# Patient Record
Sex: FEMALE | Race: WHITE | Employment: FULL TIME | URBAN - METROPOLITAN AREA
[De-identification: names, ages, dates, MRNs, and addresses within clinical notes are randomized per-mention and may not be internally consistent; named-entity substitution may affect disease eponyms.]

---

## 2017-10-31 ENCOUNTER — TELEPHONE (OUTPATIENT)
Dept: PRIMARY CARE CLINIC | Age: 57
End: 2017-10-31

## 2017-10-31 ENCOUNTER — HOSPITAL ENCOUNTER (OUTPATIENT)
Dept: GENERAL RADIOLOGY | Age: 57
Discharge: HOME | End: 2017-10-31
Payer: COMMERCIAL

## 2017-10-31 ENCOUNTER — HOSPITAL ENCOUNTER (OUTPATIENT)
Age: 57
Discharge: HOME | End: 2017-10-31
Payer: COMMERCIAL

## 2017-10-31 ENCOUNTER — OFFICE VISIT (OUTPATIENT)
Dept: PRIMARY CARE CLINIC | Age: 57
End: 2017-10-31
Payer: COMMERCIAL

## 2017-10-31 VITALS
HEART RATE: 77 BPM | WEIGHT: 226.3 LBS | BODY MASS INDEX: 40.09 KG/M2 | SYSTOLIC BLOOD PRESSURE: 156 MMHG | DIASTOLIC BLOOD PRESSURE: 74 MMHG | RESPIRATION RATE: 16 BRPM

## 2017-10-31 DIAGNOSIS — M79.672 LEFT FOOT PAIN: Primary | ICD-10-CM

## 2017-10-31 DIAGNOSIS — M79.672 LEFT FOOT PAIN: ICD-10-CM

## 2017-10-31 PROCEDURE — 99213 OFFICE O/P EST LOW 20 MIN: CPT | Performed by: NURSE PRACTITIONER

## 2017-10-31 PROCEDURE — 73630 X-RAY EXAM OF FOOT: CPT

## 2017-10-31 ASSESSMENT — ENCOUNTER SYMPTOMS
COLOR CHANGE: 0
GASTROINTESTINAL NEGATIVE: 1
RESPIRATORY NEGATIVE: 1

## 2017-10-31 NOTE — PROGRESS NOTES
[facility] [address]  Dept: [phone]  Dept Fax: [phone]    Angel Romero is a 61 y.o. female who presents to the [facility] today for her medical conditions/complaints as noted below. Angel Romero is c/o of Foot Pain (left foot. no injury that pt is aware of.  started wednesday.  )      HPI:     Foot Pain   This is a new problem. The current episode started in the past 7 days. The problem has been unchanged. The symptoms are aggravated by standing and walking. She has tried nothing for the symptoms. Past Medical History:   Diagnosis Date    Diabetes mellitus ([facility] [address]     Hepatitis A     Hyperlipidemia     Hypertension         Current Outpatient Prescriptions   Medication Sig Dispense Refill    lisinopril-hydrochlorothiazide (PRINZIDE;ZESTORETIC) 20-12.5 MG per tablet TAKE ONE TABLET BY MOUTH ONCE DAILY 90 tablet 1    SitaGLIPtin-MetFORMIN HCl -1000 MG TB24 Take 1 tablet by mouth daily PATIENT ASSISTANCE 90 tablet 3    ezetimibe (ZETIA) 10 MG tablet Take 1 tablet by mouth daily 90 tablet 3    SitaGLIPtin-MetFORMIN HCl ER (JANUMET XR) 100-1000 MG TB24 Take 1 tablet by mouth daily for 14 days SAMPLE 14 tablet 0    gabapentin (NEURONTIN) 100 MG capsule Take 1 capsule by mouth daily 90 capsule 3     No current facility-administered medications for this visit. Allergies   Allergen Reactions    Statins Swelling       Subjective:      Review of Systems    Objective:     Physical Exam  There were no vitals taken for this visit. Assessment:     No diagnosis found. Plan:       See above note     Discussed exam, POCT findings, plan of care (including prescriptive and supportive as listed below) and follow-up at length with patient and or Patient. Reviewed all prescribed and recommended medications, administration and side effects.   Specifically: acetaminophen, ibuprofen,    Encouraged to return to

## 2017-10-31 NOTE — PROGRESS NOTES
Subjective:      Patient ID: Elva Kennedy is a 61 y.o. female. \"I wore a new pair of shoes last week and that when my left foot started hurting\"      Foot Pain   This is a new problem. The current episode started in the past 7 days. The problem occurs constantly. The problem has been rapidly worsening. Pertinent negatives include no fever, joint swelling or rash. The symptoms are aggravated by walking. She has tried nothing for the symptoms. Review of Systems   Constitutional: Negative. Negative for activity change, appetite change and fever. HENT: Negative. Respiratory: Negative. Cardiovascular: Negative. Gastrointestinal: Negative. Genitourinary: Negative. Musculoskeletal: Negative for joint swelling. Left foot pain    Skin: Negative. Negative for color change, pallor, rash and wound. Objective:   Physical Exam   Constitutional: She is oriented to person, place, and time. Vital signs are normal. She appears well-developed and well-nourished. She is cooperative. Non-toxic appearance. She does not appear ill. No distress. Appears well hydrated and non toxic. Sitting upright in chair without distress. Respirations are regular, non labored and quiet. HENT:   Head: Normocephalic and atraumatic. Nose: Nose normal.   Mouth/Throat: Uvula is midline, oropharynx is clear and moist and mucous membranes are normal.   Eyes: Conjunctivae and EOM are normal. Pupils are equal, round, and reactive to light. Neck: Normal range of motion. Neck supple. No tracheal deviation present. Cardiovascular: Normal rate, regular rhythm, normal heart sounds and intact distal pulses. Exam reveals no gallop and no friction rub. No murmur heard. Pulses:       Radial pulses are 2+ on the left side. Dorsalis pedis pulses are 2+ on the right side, and 2+ on the left side. Posterior tibial pulses are 2+ on the right side, and 2+ on the left side.    Pulmonary/Chest: Effort normal. No accessory muscle usage. No tachypnea. No respiratory distress. She has no decreased breath sounds. She has no wheezes. She has no rhonchi. She has no rales. Breath sounds are clear to auscultation over posterior bilateral fields. Chest expansion is symmetrical with non-restricted air movement. Musculoskeletal: Normal range of motion. She exhibits tenderness. She exhibits no edema or deformity. Left ankle: Normal.        Feet:    Neurological: She is alert and oriented to person, place, and time. She has normal strength. No cranial nerve deficit. She exhibits normal muscle tone. Coordination and gait normal.   Skin: Skin is warm and dry. No abrasion, no bruising, no ecchymosis and no rash noted. No erythema. Psychiatric: She has a normal mood and affect. Her speech is normal and behavior is normal. Judgment and thought content normal.   Nursing note and vitals reviewed. Assessment:       1. Left foot pain  XR FOOT LEFT (MIN 3 VIEWS)           Plan:       Caregiver/patient informed today if any severe or worsening of symptoms, spikes in fever, difficulty swallowing, respiratory distress, calf pain or tendeness to go to ED. Caregiver/patient verbalizes understanding. Orders Placed This Encounter   Procedures    XR FOOT LEFT (MIN 3 VIEWS)     Standing Status:   Future     Standing Expiration Date:   10/31/2018     Order Specific Question:   Reason for exam:     Answer:   left foot pain     No orders of the defined types were placed in this encounter. She is asked to follow-up if symptoms persist or worsen.

## 2017-11-06 ENCOUNTER — OFFICE VISIT (OUTPATIENT)
Dept: PRIMARY CARE CLINIC | Age: 57
End: 2017-11-06
Payer: COMMERCIAL

## 2017-11-06 ENCOUNTER — TELEPHONE (OUTPATIENT)
Dept: PRIMARY CARE CLINIC | Age: 57
End: 2017-11-06

## 2017-11-06 VITALS
TEMPERATURE: 97.8 F | HEIGHT: 62 IN | SYSTOLIC BLOOD PRESSURE: 172 MMHG | HEART RATE: 77 BPM | RESPIRATION RATE: 18 BRPM | DIASTOLIC BLOOD PRESSURE: 75 MMHG | WEIGHT: 229.4 LBS | BODY MASS INDEX: 42.21 KG/M2

## 2017-11-06 DIAGNOSIS — Z12.12 SCREENING FOR COLORECTAL CANCER: Primary | ICD-10-CM

## 2017-11-06 DIAGNOSIS — I10 ESSENTIAL HYPERTENSION: ICD-10-CM

## 2017-11-06 DIAGNOSIS — Z12.11 SCREENING FOR COLORECTAL CANCER: Primary | ICD-10-CM

## 2017-11-06 DIAGNOSIS — Z23 NEED FOR VACCINATION AGAINST STREPTOCOCCUS PNEUMONIAE: ICD-10-CM

## 2017-11-06 DIAGNOSIS — E78.5 DYSLIPIDEMIA: ICD-10-CM

## 2017-11-06 LAB — HBA1C MFR BLD: 12.6 %

## 2017-11-06 PROCEDURE — 90471 IMMUNIZATION ADMIN: CPT | Performed by: NURSE PRACTITIONER

## 2017-11-06 PROCEDURE — 99214 OFFICE O/P EST MOD 30 MIN: CPT | Performed by: NURSE PRACTITIONER

## 2017-11-06 PROCEDURE — 83036 HEMOGLOBIN GLYCOSYLATED A1C: CPT | Performed by: NURSE PRACTITIONER

## 2017-11-06 PROCEDURE — 90732 PPSV23 VACC 2 YRS+ SUBQ/IM: CPT | Performed by: NURSE PRACTITIONER

## 2017-11-06 RX ORDER — LISINOPRIL AND HYDROCHLOROTHIAZIDE 20; 12.5 MG/1; MG/1
TABLET ORAL
Qty: 90 TABLET | Refills: 1 | Status: SHIPPED | OUTPATIENT
Start: 2017-11-06 | End: 2020-07-22 | Stop reason: SDDI

## 2017-11-06 ASSESSMENT — ENCOUNTER SYMPTOMS
SHORTNESS OF BREATH: 0
CONSTIPATION: 0
VOMITING: 0
DIARRHEA: 0
RHINORRHEA: 0
WHEEZING: 0
ABDOMINAL PAIN: 0
SORE THROAT: 0
COUGH: 0
NAUSEA: 0

## 2017-11-06 ASSESSMENT — PATIENT HEALTH QUESTIONNAIRE - PHQ9
2. FEELING DOWN, DEPRESSED OR HOPELESS: 1
SUM OF ALL RESPONSES TO PHQ QUESTIONS 1-9: 2
1. LITTLE INTEREST OR PLEASURE IN DOING THINGS: 1
SUM OF ALL RESPONSES TO PHQ9 QUESTIONS 1 & 2: 2

## 2017-11-06 NOTE — PROGRESS NOTES
172/75 (Site: Right Arm, Position: Sitting, Cuff Size: Large Adult)   Pulse 77   Temp 97.8 °F (36.6 °C) (Temporal)   Resp 18   Ht 5' 2\" (1.575 m)   Wt 229 lb 6.4 oz (104.1 kg)   BMI 41.96 kg/m²     Physical Exam   Constitutional: She is oriented to person, place, and time. She appears well-developed and well-nourished. No distress. Over nourished   HENT:   Mouth/Throat: Oropharynx is clear and moist.   Eyes: Conjunctivae are normal.   Neck: Normal range of motion. Neck supple. Cardiovascular: Normal rate and regular rhythm. No murmur heard. Pulmonary/Chest: Effort normal and breath sounds normal. She has no wheezes. Abdominal: Soft. Bowel sounds are normal. She exhibits no distension. There is no tenderness. Obese abdomen   Musculoskeletal: She exhibits no edema. Lymphadenopathy:     She has no cervical adenopathy. Neurological: She is alert and oriented to person, place, and time. Skin: Skin is warm and dry. No rash noted. Psychiatric: She has a normal mood and affect. Her behavior is normal.   Nursing note and vitals reviewed. Patient is here for diabetic followup. A complete foot exam was done as follows:  Skin: Skin color, texture, turgor normal. No rashes or lesions.   Pulses:  present 2+  Nails:  normal appearing nails bilaterally  Monofilament testing:  present  Hair:  partially present    Data:     Lab Results   Component Value Date     07/18/2016    K 4.0 07/18/2016     07/18/2016    CO2 26 07/18/2016    BUN 23 07/18/2016    CREATININE 0.79 07/18/2016    GLUCOSE 141 07/18/2016    PROT 5.8 01/09/2015    LABALBU 4.3 01/27/2015    BILITOT 0.40 01/09/2015    ALKPHOS 57 01/09/2015    AST 19 07/18/2016    ALT 24 07/18/2016     Lab Results   Component Value Date    WBC 8.8 07/18/2016    RBC 4.80 07/18/2016    HGB 13.2 07/18/2016    HCT 39.9 07/18/2016    MCV 83.7 01/09/2015    MCH 27.6 01/09/2015    MCHC 33.0 01/09/2015    RDW 14.5 01/09/2015     07/18/2016    MPV NOT REPORTED 01/09/2015     Lab Results   Component Value Date    TSH 1.78 07/18/2016     Lab Results   Component Value Date    CHOL 217 07/18/2016    HDL 46 07/18/2016    LABA1C 12.6 11/06/2017       Assessment/Plan:     1. Uncontrolled type 2 diabetes mellitus with complication, without long-term current use of insulin (HCC)   DIABETES FOOT EXAM    POCT glycosylated hemoglobin (Hb A1C)   2. Essential hypertension  lisinopril-hydrochlorothiazide (PRINZIDE;ZESTORETIC) 20-12.5 MG per tablet   3. Dyslipidemia     4. Need for vaccination against Streptococcus pneumoniae  Pneumococcal polysaccharide vaccine 23-valent greater than or equal to 3yo subcutaneous/IM       1.  Wendy received counseling on the following healthy behaviors: nutrition, exercise and medication adherence  2. Patient given educational materials - see patient instructions  3. Was a self-tracking handout given in paper form or via [employer]? No  If yes, see orders or list here. 4.  Discussed use, benefit, and side effects of prescribed medications. Barriers to medication compliance addressed. All patient questions answered. Pt voiced understanding. 5.  Reviewed prior labs and health maintenance  6. Continue current medications, diet and exercise. Completed Refills   Requested Prescriptions     Signed Prescriptions Disp Refills    lisinopril-hydrochlorothiazide (PRINZIDE;ZESTORETIC) 20-12.5 MG per tablet 90 tablet 1     Sig: TAKE ONE TABLET BY MOUTH ONCE DAILY       LABS AT [facility] IN 4 DAYS   BRING RESULTS TO OFFICE  RESTART ANTIHYPERTENSIVE  BP CHECK IN 2 WEEKS    Return in about 6 months (around 5/6/2018) for check up.

## 2017-11-06 NOTE — PATIENT INSTRUCTIONS
infection. ¨ Keep your skin soft. Use moisturizing skin cream to keep the skin on your feet soft and prevent calluses and cracks. But do not put the cream between your toes, and stop using any cream that causes a rash. ¨ Clean underneath your toenails carefully. Do not use a sharp object to clean underneath your toenails. Use the blunt end of a nail file or other rounded tool. ¨ Trim and file your toenails straight across to prevent ingrown toenails. Use a nail clipper, not scissors. Use an emery board to smooth the edges. · Change socks daily. Socks without seams are best, because seams often rub the feet. You can find socks for people with diabetes from specialty catalogs. · Look inside your shoes every day for things like gravel or torn linings, which could cause blisters or sores. · Buy shoes that fit well:  ¨ Look for shoes that have plenty of space around the toes. This helps prevent bunions and blisters. ¨ Try on shoes while wearing the kind of socks you will usually wear with the shoes. ¨ Avoid plastic shoes. They may rub your feet and cause blisters. Good shoes should be made of materials that are flexible and breathable, such as leather or cloth. ¨ Break in new shoes slowly by wearing them for no more than an hour a day for several days. Take extra time to check your feet for red areas, blisters, or other problems after you wear new shoes. · Do not go barefoot. Do not wear sandals, and do not wear shoes with very thin soles. Thin soles are easy to puncture. They also do not protect your feet from hot pavement or cold weather. · Have your doctor check your feet during each visit. If you have a foot problem, see your doctor. Do not try to treat an early foot problem at home. Home remedies or treatments that you can buy without a prescription (such as corn removers) can be harmful. · Always get early treatment for foot problems.  A minor irritation can lead to a major problem if not properly cared for early. When should you call for help? Call your doctor now or seek immediate medical care if:  · You have a foot sore, an ulcer or break in the skin that is not healing after 4 days, bleeding corns or calluses, or an ingrown toenail. · You have blue or black areas, which can mean bruising or blood flow problems. · You have peeling skin or tiny blisters between your toes or cracking or oozing of the skin. · You have a fever for more than 24 hours and a foot sore. · You have new numbness or tingling in your feet that does not go away after you move your feet or change positions. · You have unexplained or unusual swelling of the foot or ankle. Watch closely for changes in your health, and be sure to contact your doctor if:  · You cannot do proper foot care. Where can you learn more? Go to [email or web address] org and sign in to your [employer] account. Enter A739 in the [employer] box to learn more about \"Diabetes Foot Health: Care Instructions. \"     If you do not have an account, please click on the \"Sign Up Now\" link. Current as of: March 13, 2017  Content Version: 11.3  © 4735-6901 [employer]. Care instructions adapted under license by [facility] ([facility]). If you have questions about a medical condition or this instruction, always ask your healthcare professional. Mary Ann [employer] any warranty or liability for your use of this information.

## 2017-11-10 LAB
ALT SERPL-CCNC: 27 U/L
AST SERPL-CCNC: 22 U/L
BASOPHILS ABSOLUTE: NORMAL /ΜL
BASOPHILS RELATIVE PERCENT: NORMAL %
BUN BLDV-MCNC: 24 MG/DL
CALCIUM SERPL-MCNC: 10.7 MG/DL
CHLORIDE BLD-SCNC: 96 MMOL/L
CHOLESTEROL, TOTAL: 271 MG/DL
CHOLESTEROL/HDL RATIO: 5.4
CO2: 24 MMOL/L
CREAT SERPL-MCNC: 0.66 MG/DL
EOSINOPHILS ABSOLUTE: NORMAL /ΜL
EOSINOPHILS RELATIVE PERCENT: NORMAL %
GFR CALCULATED: >60
GLUCOSE BLD-MCNC: 263 MG/DL
HCT VFR BLD CALC: 43.6 % (ref 36–46)
HDLC SERPL-MCNC: 50 MG/DL (ref 35–70)
HEMOGLOBIN: 14 G/DL (ref 12–16)
LDL CHOLESTEROL CALCULATED: 175 MG/DL (ref 0–160)
LYMPHOCYTES ABSOLUTE: NORMAL /ΜL
LYMPHOCYTES RELATIVE PERCENT: NORMAL %
MCH RBC QN AUTO: NORMAL PG
MCHC RBC AUTO-ENTMCNC: NORMAL G/DL
MCV RBC AUTO: NORMAL FL
MONOCYTES ABSOLUTE: NORMAL /ΜL
MONOCYTES RELATIVE PERCENT: NORMAL %
NEUTROPHILS ABSOLUTE: NORMAL /ΜL
NEUTROPHILS RELATIVE PERCENT: NORMAL %
PLATELET # BLD: 249 K/ΜL
PMV BLD AUTO: 9.2 FL
POTASSIUM SERPL-SCNC: 4.6 MMOL/L
RBC # BLD: 5.24 10^6/ΜL
SODIUM BLD-SCNC: 139 MMOL/L
TRIGL SERPL-MCNC: 232 MG/DL
VLDLC SERPL CALC-MCNC: ABNORMAL MG/DL
WBC # BLD: 7.7 10^3/ML

## 2017-11-20 ENCOUNTER — NURSE ONLY (OUTPATIENT)
Dept: PRIMARY CARE CLINIC | Age: 57
End: 2017-11-20

## 2017-11-20 VITALS
TEMPERATURE: 97.1 F | WEIGHT: 226.6 LBS | DIASTOLIC BLOOD PRESSURE: 60 MMHG | HEART RATE: 78 BPM | SYSTOLIC BLOOD PRESSURE: 130 MMHG | BODY MASS INDEX: 41.45 KG/M2 | RESPIRATION RATE: 18 BRPM

## 2017-11-20 DIAGNOSIS — E78.5 DYSLIPIDEMIA: ICD-10-CM

## 2017-11-20 RX ORDER — EZETIMIBE 10 MG/1
10 TABLET ORAL DAILY
Qty: 90 TABLET | Refills: 3 | Status: SHIPPED | OUTPATIENT
Start: 2017-11-20 | End: 2020-07-22 | Stop reason: SDDI

## 2017-11-21 ENCOUNTER — TELEPHONE (OUTPATIENT)
Dept: PRIMARY CARE CLINIC | Age: 57
End: 2017-11-21

## 2017-12-06 ENCOUNTER — TELEPHONE (OUTPATIENT)
Dept: PRIMARY CARE CLINIC | Age: 57
End: 2017-12-06

## 2017-12-19 ENCOUNTER — TELEPHONE (OUTPATIENT)
Dept: PRIMARY CARE CLINIC | Age: 57
End: 2017-12-19

## 2019-09-26 ENCOUNTER — TELEPHONE (OUTPATIENT)
Dept: PRIMARY CARE CLINIC | Age: 59
End: 2019-09-26

## 2020-01-14 ENCOUNTER — TELEPHONE (OUTPATIENT)
Dept: PRIMARY CARE CLINIC | Age: 60
End: 2020-01-14

## 2020-06-29 ENCOUNTER — OFFICE VISIT (OUTPATIENT)
Dept: OBGYN | Age: 60
End: 2020-06-29
Payer: COMMERCIAL

## 2020-06-29 ENCOUNTER — HOSPITAL ENCOUNTER (OUTPATIENT)
Age: 60
Setting detail: SPECIMEN
Discharge: HOME | End: 2020-06-29
Payer: COMMERCIAL

## 2020-06-29 VITALS
BODY MASS INDEX: 37.36 KG/M2 | HEIGHT: 62 IN | SYSTOLIC BLOOD PRESSURE: 134 MMHG | WEIGHT: 203 LBS | DIASTOLIC BLOOD PRESSURE: 70 MMHG

## 2020-06-29 PROCEDURE — 86403 PARTICLE AGGLUT ANTBDY SCRN: CPT

## 2020-06-29 PROCEDURE — 87070 CULTURE OTHR SPECIMN AEROBIC: CPT

## 2020-06-29 PROCEDURE — 99202 OFFICE O/P NEW SF 15 MIN: CPT | Performed by: OBSTETRICS & GYNECOLOGY

## 2020-06-29 PROCEDURE — 87205 SMEAR GRAM STAIN: CPT

## 2020-06-29 RX ORDER — CIPROFLOXACIN 250 MG/1
250 TABLET, FILM COATED ORAL 2 TIMES DAILY
Qty: 14 TABLET | Refills: 0 | Status: SHIPPED | OUTPATIENT
Start: 2020-06-29 | End: 2020-07-02

## 2020-06-29 RX ORDER — HYDROCODONE BITARTRATE AND ACETAMINOPHEN 5; 325 MG/1; MG/1
1 TABLET ORAL EVERY 4 HOURS PRN
Qty: 18 TABLET | Refills: 0 | Status: SHIPPED | OUTPATIENT
Start: 2020-06-29 | End: 2020-07-02 | Stop reason: SDUPTHER

## 2020-06-29 NOTE — PROGRESS NOTES
PROBLEM VISIT     Date of service: 2020    Kiesha Carlson  Is a 60 y.o.  female    PT's PCP is: No primary care provider on file. : 1960                                             Subjective:       No LMP recorded. Patient has had a hysterectomy. OB History   No obstetric history on file. Social History     Tobacco Use   Smoking Status Never Smoker   Smokeless Tobacco Never Used        Social History     Substance and Sexual Activity   Alcohol Use No       Allergies: Statins      Current Outpatient Medications:     SITagliptin-metFORMIN (JANUMET XR)  MG TB24 per extended release tablet, Take 2 tablets by mouth daily With supper (Patient not taking: Reported on 2020), Disp: 180 tablet, Rfl: 3    ezetimibe (ZETIA) 10 MG tablet, Take 1 tablet by mouth daily (Patient not taking: Reported on 2020), Disp: 90 tablet, Rfl: 3    lisinopril-hydrochlorothiazide (PRINZIDE;ZESTORETIC) 20-12.5 MG per tablet, TAKE ONE TABLET BY MOUTH ONCE DAILY (Patient not taking: Reported on 2020), Disp: 90 tablet, Rfl: 1    Social History     Substance and Sexual Activity   Sexual Activity Not on file       Last Yearly:  unknown    Last pap: unknown    Last HPV: unknown    Chief Complaint   Patient presents with    Perirectal Abscess     Patient presents today with c/o possible Batholin Abscess          NURSE: CARYN    PE:  Vital Signs  Blood pressure 134/70, height 5' 2\" (1.575 m), weight 203 lb (92.1 kg). Labs:    No results found for this visit on 20. HPI: The patient is here as she has developed a painful boil or abscess. This started Thursday and is gotten worse. However it is started to drain today. She has not taken any antibiotics and is significantly uncomfortable from this. No PT denies fever, chills, nausea and vomiting       Objective: The patient has a large abscess in her left upper gluteal to maria esther-vulvar area.   The abscess is draining over the central portion and is open the area of erythema is about 7 8 cm total days of this skin is peeling around the abscess is well there are no obvious tracks from this. Assessment and Plan:    I did instruct the patient to place heat over this whenever possible to take Cipro as directed and use Norco to help with pain she can also take Motrin with this. I instructed her that if this worsens to return to the emergency room and is scheduled to follow-up for later this week to track the progress     Culture was also obtained     Diagnosis Orders   1. Abscess of gluteal region  HYDROcodone-acetaminophen (NORCO) 5-325 MG per tablet    ciprofloxacin (CIPRO) 250 MG tablet             I am having Wendy Garner start on HYDROcodone-acetaminophen and ciprofloxacin. I am also having her maintain her lisinopril-hydroCHLOROthiazide, SITagliptin-metFORMIN, and ezetimibe. No follow-ups on file. There are no Patient Instructions on file for this visit. Over 50% of time spent on counseling and care coordination on: see assessment and plan,  She was also counseled on her preventative health maintenance recommendations and follow-up.         FF time: 15 min      Genette Dandy Hedges,6/29/2020 3:26 PM

## 2020-07-01 LAB
CULTURE: ABNORMAL
DIRECT EXAM: ABNORMAL
DIRECT EXAM: ABNORMAL
Lab: ABNORMAL
SPECIMEN DESCRIPTION: ABNORMAL

## 2020-07-02 ENCOUNTER — OFFICE VISIT (OUTPATIENT)
Dept: OBGYN | Age: 60
End: 2020-07-02
Payer: COMMERCIAL

## 2020-07-02 VITALS
BODY MASS INDEX: 37.73 KG/M2 | DIASTOLIC BLOOD PRESSURE: 74 MMHG | HEIGHT: 62 IN | SYSTOLIC BLOOD PRESSURE: 138 MMHG | WEIGHT: 205 LBS

## 2020-07-02 PROCEDURE — 10060 I&D ABSCESS SIMPLE/SINGLE: CPT | Performed by: OBSTETRICS & GYNECOLOGY

## 2020-07-02 RX ORDER — CIPROFLOXACIN 500 MG/1
500 TABLET, FILM COATED ORAL 2 TIMES DAILY
Qty: 14 TABLET | Refills: 0 | Status: SHIPPED | OUTPATIENT
Start: 2020-07-02 | End: 2020-07-06 | Stop reason: SDUPTHER

## 2020-07-02 RX ORDER — HYDROCODONE BITARTRATE AND ACETAMINOPHEN 5; 325 MG/1; MG/1
1 TABLET ORAL EVERY 4 HOURS PRN
Qty: 24 TABLET | Refills: 0 | Status: SHIPPED | OUTPATIENT
Start: 2020-07-02 | End: 2020-07-07

## 2020-07-02 NOTE — PROGRESS NOTES
PROBLEM VISIT     Date of service: 2020    Joseph Natarajan  Is a 60 y.o.  female    PT's PCP is: No primary care provider on file. : 1960                                             Subjective:       No LMP recorded. Patient has had a hysterectomy. OB History   No obstetric history on file. Social History     Tobacco Use   Smoking Status Never Smoker   Smokeless Tobacco Never Used        Social History     Substance and Sexual Activity   Alcohol Use No       Allergies: Statins      Current Outpatient Medications:     HYDROcodone-acetaminophen (NORCO) 5-325 MG per tablet, Take 1 tablet by mouth every 4 hours as needed for Pain for up to 5 days. Intended supply: 3 days. Take lowest dose possible to manage pain, Disp: 24 tablet, Rfl: 0    ciprofloxacin (CIPRO) 500 MG tablet, Take 1 tablet by mouth 2 times daily for 7 days, Disp: 14 tablet, Rfl: 0    SITagliptin-metFORMIN (JANUMET XR)  MG TB24 per extended release tablet, Take 2 tablets by mouth daily With supper (Patient not taking: Reported on 2020), Disp: 180 tablet, Rfl: 3    ezetimibe (ZETIA) 10 MG tablet, Take 1 tablet by mouth daily (Patient not taking: Reported on 2020), Disp: 90 tablet, Rfl: 3    lisinopril-hydrochlorothiazide (PRINZIDE;ZESTORETIC) 20-12.5 MG per tablet, TAKE ONE TABLET BY MOUTH ONCE DAILY (Patient not taking: Reported on 2020), Disp: 90 tablet, Rfl: 1    Social History     Substance and Sexual Activity   Sexual Activity Not on file       Last Yearly:  unknown    Last pap: unknown    Last HPV: never    Chief Complaint   Patient presents with    Wound Check     Patient presents today for recheck abscess of buttocks          NURSE: CARYN    PE:  Vital Signs  Blood pressure 138/74, height 5' 2\" (1.575 m), weight 205 lb (93 kg). Labs:    No results found for this visit on 20.     NURSE: cirilo    HPI: The patient is here for recheck for the abscess in the area on the buttocks inferior to the left labia majora. That the area that was draining previously does appear to be slightly improved but there is now an area of induration and tenderness that superior to the area that was draining. This area is also very tense and erythematous. The patient states she even felt dizzy the other day. sHe denies having a fever    No  PT denies fever, chills, nausea and vomiting       Objective: Please see above for description of this abscess. Assessment and Plan: Due to this area of increased induration erythema and swelling that she may improve with incision and drainage. I did obtain consent from the patient verbally. She does understand that she will have some light bleeding and scarring. The area was thoroughly cleaned with Betadine and infiltrated with 1% lidocaine scalpel was used to make an incision about 2 cm in length no gross pus was noted but this did connect with a drainage tract and this was packed with a 2 x 2 gauze. I am going to have patient continue the use of antibiotics, continue the use of hot soaks and if she notices any worsening of condition to present to the emergency room over the holiday weekend. I will also extend her work leave and continue prescription of narcotics. I will also increase her dose of Cipro to 500 mg twice daily and have her follow-up on Monday          Diagnosis Orders   1. Abscess of gluteal region  HYDROcodone-acetaminophen (NORCO) 5-325 MG per tablet    ciprofloxacin (CIPRO) 500 MG tablet    92956 - TX DRAIN SKIN ABSCESS SIMPLE             No follow-ups on file. FF: 20 minutes    There are no Patient Instructions on file for this visit. Over 75%of time spent on counseling and care coordination on: see assessment and plan,  She was also counseled on her preventative health maintenance recommendations and follow-up.       Emma Iverson,7/2/2020 1:22 PM

## 2020-07-06 ENCOUNTER — OFFICE VISIT (OUTPATIENT)
Dept: OBGYN | Age: 60
End: 2020-07-06
Payer: COMMERCIAL

## 2020-07-06 VITALS
BODY MASS INDEX: 37.54 KG/M2 | DIASTOLIC BLOOD PRESSURE: 85 MMHG | SYSTOLIC BLOOD PRESSURE: 138 MMHG | HEIGHT: 62 IN | WEIGHT: 204 LBS

## 2020-07-06 PROCEDURE — 99024 POSTOP FOLLOW-UP VISIT: CPT | Performed by: OBSTETRICS & GYNECOLOGY

## 2020-07-06 RX ORDER — CLOTRIMAZOLE AND BETAMETHASONE DIPROPIONATE 10; .64 MG/G; MG/G
CREAM TOPICAL
Qty: 1 TUBE | Refills: 1 | Status: SHIPPED | OUTPATIENT
Start: 2020-07-06 | End: 2020-09-15

## 2020-07-06 RX ORDER — CIPROFLOXACIN 500 MG/1
500 TABLET, FILM COATED ORAL 2 TIMES DAILY
Qty: 14 TABLET | Refills: 0 | Status: SHIPPED | OUTPATIENT
Start: 2020-07-06 | End: 2020-07-13

## 2020-07-06 ASSESSMENT — PATIENT HEALTH QUESTIONNAIRE - PHQ9
1. LITTLE INTEREST OR PLEASURE IN DOING THINGS: 0
SUM OF ALL RESPONSES TO PHQ QUESTIONS 1-9: 0
SUM OF ALL RESPONSES TO PHQ QUESTIONS 1-9: 0
SUM OF ALL RESPONSES TO PHQ9 QUESTIONS 1 & 2: 0
2. FEELING DOWN, DEPRESSED OR HOPELESS: 0

## 2020-07-06 NOTE — PROGRESS NOTES
PROBLEM VISIT     Date of service: 2020    Sandra Eckert  Is a 63 y.o.  female    PT's PCP is: No primary care provider on file. : 1960                                             Subjective:       No LMP recorded. Patient has had a hysterectomy. OB History   No obstetric history on file. Social History     Tobacco Use   Smoking Status Never Smoker   Smokeless Tobacco Never Used        Social History     Substance and Sexual Activity   Alcohol Use No       Allergies: Statins      Current Outpatient Medications:     HYDROcodone-acetaminophen (NORCO) 5-325 MG per tablet, Take 1 tablet by mouth every 4 hours as needed for Pain for up to 5 days. Intended supply: 3 days. Take lowest dose possible to manage pain, Disp: 24 tablet, Rfl: 0    ezetimibe (ZETIA) 10 MG tablet, Take 1 tablet by mouth daily, Disp: 90 tablet, Rfl: 3    ciprofloxacin (CIPRO) 500 MG tablet, Take 1 tablet by mouth 2 times daily for 7 days (Patient not taking: Reported on 2020), Disp: 14 tablet, Rfl: 0    SITagliptin-metFORMIN (JANUMET XR)  MG TB24 per extended release tablet, Take 2 tablets by mouth daily With supper (Patient not taking: Reported on 2020), Disp: 180 tablet, Rfl: 3    lisinopril-hydrochlorothiazide (PRINZIDE;ZESTORETIC) 20-12.5 MG per tablet, TAKE ONE TABLET BY MOUTH ONCE DAILY (Patient not taking: Reported on 2020), Disp: 90 tablet, Rfl: 1    Social History     Substance and Sexual Activity   Sexual Activity Not on file       Last Yearly: Unknown    Last pap: Unknown    Last HPV: Never    Chief Complaint   Patient presents with    Follow-up     f/u boil. pt states boil is still there but is getting better        PE:  Vital Signs  Blood pressure 138/85, height 5' 2\" (1.575 m), weight 204 lb (92.5 kg). NURSE: JOSÉ MANUEL    HPI: The patient is here for a follow-up on the large boil in the gluteal region. There is far less edema and less erythema.   The area where the incision was made is actually healing well so there is a significant overall improvement in the appearance of this. There was an area of necrosis skin that was debrided from where the drainage tract was from the abscess. The patient had not refilled the 500 mg Cipro as I had requested earlier and then re-wrote the prescription to make sure she does so she is also developed tinea cruris and intertrigo. So I did write Lotrisone for this. Assessment/plan: This area is slowly improving in appearance over the boil the patient is off of work at this time is using hot soaks and I have also asked her to clean the area around the drainage tract from the boil with peroxide as well. We will have her follow-up later this week.

## 2020-07-10 ENCOUNTER — OFFICE VISIT (OUTPATIENT)
Dept: OBGYN | Age: 60
End: 2020-07-10
Payer: COMMERCIAL

## 2020-07-10 VITALS
DIASTOLIC BLOOD PRESSURE: 81 MMHG | HEIGHT: 62 IN | BODY MASS INDEX: 37.54 KG/M2 | SYSTOLIC BLOOD PRESSURE: 135 MMHG | WEIGHT: 204 LBS

## 2020-07-10 PROCEDURE — 99024 POSTOP FOLLOW-UP VISIT: CPT | Performed by: OBSTETRICS & GYNECOLOGY

## 2020-07-10 NOTE — PROGRESS NOTES
PROBLEM VISIT     Date of service: 7/10/2020    Katherine Garcia  Is a 63 y.o.  female    PT's PCP is: No primary care provider on file. : 1960                                             Subjective:       No LMP recorded. Patient has had a hysterectomy. OB History   No obstetric history on file. Social History     Tobacco Use   Smoking Status Never Smoker   Smokeless Tobacco Never Used        Social History     Substance and Sexual Activity   Alcohol Use No       Allergies: Statins      Current Outpatient Medications:     ciprofloxacin (CIPRO) 500 MG tablet, Take 1 tablet by mouth 2 times daily for 7 days, Disp: 14 tablet, Rfl: 0    clotrimazole-betamethasone (LOTRISONE) 1-0.05 % cream, Apply topically 2 times daily. , Disp: 1 Tube, Rfl: 1    ezetimibe (ZETIA) 10 MG tablet, Take 1 tablet by mouth daily, Disp: 90 tablet, Rfl: 3    SITagliptin-metFORMIN (JANUMET XR)  MG TB24 per extended release tablet, Take 2 tablets by mouth daily With supper (Patient not taking: Reported on 2020), Disp: 180 tablet, Rfl: 3    lisinopril-hydrochlorothiazide (PRINZIDE;ZESTORETIC) 20-12.5 MG per tablet, TAKE ONE TABLET BY MOUTH ONCE DAILY (Patient not taking: Reported on 7/10/2020), Disp: 90 tablet, Rfl: 1    Social History     Substance and Sexual Activity   Sexual Activity Not on file       Last Yearly:  unknown    Last pap: unknown    Last HPV: Never    Chief Complaint   Patient presents with    Follow-up     f/u vaginal boil - pt states she still has some pain but it is much lessoned and is getting better since her last f/u        PE:  Vital Signs  Blood pressure 135/81, height 5' 2\" (1.575 m), weight 204 lb (92.5 kg). NURSE: JOSÉ MANUEL    HPI: The patient is here for follow-up on a large abscess on her left upper buttock.   The patient states that she is finally feeling better having less pain and no more episodes of dizziness    Yes PT denies fever, chills, nausea and vomiting

## 2020-07-17 ENCOUNTER — OFFICE VISIT (OUTPATIENT)
Dept: OBGYN | Age: 60
End: 2020-07-17
Payer: COMMERCIAL

## 2020-07-17 VITALS
DIASTOLIC BLOOD PRESSURE: 76 MMHG | SYSTOLIC BLOOD PRESSURE: 138 MMHG | WEIGHT: 206 LBS | HEIGHT: 62 IN | BODY MASS INDEX: 37.91 KG/M2

## 2020-07-17 PROCEDURE — 99213 OFFICE O/P EST LOW 20 MIN: CPT | Performed by: OBSTETRICS & GYNECOLOGY

## 2020-07-17 RX ORDER — NYSTATIN 100000 [USP'U]/G
POWDER TOPICAL
Qty: 1 BOTTLE | Refills: 5 | Status: SHIPPED | OUTPATIENT
Start: 2020-07-17 | End: 2020-08-10

## 2020-07-17 NOTE — PROGRESS NOTES
PROBLEM VISIT     Date of service: 2020    Zeina Xie  Is a 63 y.o.  female    PT's PCP is: No primary care provider on file. : 1960                                             Subjective:       No LMP recorded. Patient has had a hysterectomy. OB History   No obstetric history on file. Social History     Tobacco Use   Smoking Status Never Smoker   Smokeless Tobacco Never Used        Social History     Substance and Sexual Activity   Alcohol Use No       Allergies: Statins      Current Outpatient Medications:     nystatin (MYCOSTATIN) 041766 UNIT/GM powder, Apply 3 times daily as needed for skin rash, Disp: 1 Bottle, Rfl: 5    clotrimazole-betamethasone (LOTRISONE) 1-0.05 % cream, Apply topically 2 times daily. , Disp: 1 Tube, Rfl: 1    ezetimibe (ZETIA) 10 MG tablet, Take 1 tablet by mouth daily, Disp: 90 tablet, Rfl: 3    SITagliptin-metFORMIN (JANUMET XR)  MG TB24 per extended release tablet, Take 2 tablets by mouth daily With supper (Patient not taking: Reported on 2020), Disp: 180 tablet, Rfl: 3    lisinopril-hydrochlorothiazide (PRINZIDE;ZESTORETIC) 20-12.5 MG per tablet, TAKE ONE TABLET BY MOUTH ONCE DAILY (Patient not taking: Reported on 7/10/2020), Disp: 90 tablet, Rfl: 1    Social History     Substance and Sexual Activity   Sexual Activity Not on file       Last Yearly:  unknown    Last pap: unknown    Last HPV: unknown    Chief Complaint   Patient presents with    Perirectal Abscess     Patient presents today for recheck of abscess left buttocks          NURSE: CARYN    PE:  Vital Signs  Blood pressure 138/76, height 5' 2\" (1.575 m), weight 206 lb (93.4 kg). Labs:    No results found for this visit on 20. NURSE: cirilo    HPI: The patient is here for follow-up on large abscess on the inner left buttocks. She also has severe degree of tinea cruris and I did give her Lotrisone cream for this and this cream is irritating her skin.     No  PT denies fever, chills, nausea and vomiting       Objective: There is a very severe degree of bilateral tinea cruris noted. The abscess itself is far less edematous and less erythematous. The drainage tract is healing. Assessment and Plan: Patient has finished her antibiotics and I have encouraged her to continue the use of hot soaks. And instead of using Lotrisone I did give her Mycostatin powder to use 3 times daily until the rash is cleared. We will have her follow-up in 1 week also encouraged her to make certain diabetes is under good control         Diagnosis Orders   1. Left buttock abscess     2. Tinea cruris               No follow-ups on file. FF: 15 minutes    There are no Patient Instructions on file for this visit. Over 50%of time spent on counseling and care coordination on: see assessment and plan,  She was also counseled on her preventative health maintenance recommendations and follow-up.       Kecia Iverson,7/17/2020 10:53 AM

## 2020-07-21 ENCOUNTER — TELEPHONE (OUTPATIENT)
Dept: PRIMARY CARE CLINIC | Age: 60
End: 2020-07-21

## 2020-07-22 ENCOUNTER — OFFICE VISIT (OUTPATIENT)
Dept: PRIMARY CARE CLINIC | Age: 60
End: 2020-07-22
Payer: COMMERCIAL

## 2020-07-22 VITALS
HEART RATE: 74 BPM | BODY MASS INDEX: 38.37 KG/M2 | DIASTOLIC BLOOD PRESSURE: 98 MMHG | WEIGHT: 208.5 LBS | HEIGHT: 62 IN | TEMPERATURE: 97.9 F | RESPIRATION RATE: 18 BRPM | SYSTOLIC BLOOD PRESSURE: 140 MMHG

## 2020-07-22 PROBLEM — E11.65 UNCONTROLLED TYPE 2 DIABETES MELLITUS WITH HYPERGLYCEMIA (HCC): Status: ACTIVE | Noted: 2020-07-22

## 2020-07-22 LAB — HBA1C MFR BLD: 14 %

## 2020-07-22 PROCEDURE — 83036 HEMOGLOBIN GLYCOSYLATED A1C: CPT | Performed by: NURSE PRACTITIONER

## 2020-07-22 PROCEDURE — 99214 OFFICE O/P EST MOD 30 MIN: CPT | Performed by: NURSE PRACTITIONER

## 2020-07-22 RX ORDER — ORAL SEMAGLUTIDE 3 MG/1
1 TABLET ORAL DAILY
Qty: 30 TABLET | Refills: 0 | Status: SHIPPED | OUTPATIENT
Start: 2020-07-22 | End: 2020-07-31 | Stop reason: SINTOL

## 2020-07-22 RX ORDER — ORAL SEMAGLUTIDE 14 MG/1
1 TABLET ORAL DAILY
Qty: 30 TABLET | Refills: 0 | Status: SHIPPED | OUTPATIENT
Start: 2020-09-18 | End: 2020-07-31

## 2020-07-22 RX ORDER — LISINOPRIL 10 MG/1
10 TABLET ORAL DAILY
Qty: 90 TABLET | Refills: 1 | Status: SHIPPED | OUTPATIENT
Start: 2020-07-22 | End: 2020-09-15

## 2020-07-22 RX ORDER — METFORMIN HYDROCHLORIDE 500 MG/1
1000 TABLET, EXTENDED RELEASE ORAL
Qty: 180 TABLET | Refills: 0 | Status: SHIPPED | OUTPATIENT
Start: 2020-07-22 | End: 2020-08-10 | Stop reason: SINTOL

## 2020-07-22 ASSESSMENT — ENCOUNTER SYMPTOMS
VOMITING: 0
NAUSEA: 0
SORE THROAT: 0
RHINORRHEA: 0
COUGH: 0
SHORTNESS OF BREATH: 0
ABDOMINAL PAIN: 0
CONSTIPATION: 0
DIARRHEA: 0
WHEEZING: 0

## 2020-07-22 NOTE — PROGRESS NOTES
Name: Wing Sam  : 1960         Chief Complaint:     Chief Complaint   Patient presents with    Diabetes     \"I have been working and taking care of my grandchildren and have not been taking care of myself. \" \"I need to get my health in order. \"     Hypertension    Hyperlipidemia    Mass     \"cyst on my right shoulder. \"        History of Present Illness: Wing Sam is a 63 y.o.  female who presents with Diabetes (\"I have been working and taking care of my grandchildren and have not been taking care of myself. \" \"I need to get my health in order. \" ); Hypertension; Hyperlipidemia; and Mass (\"cyst on my right shoulder. \" )      Marvin Cabezas is here today for a routine office visit. Unfortunately Ms. Leo Mcneil has not been to this office in approximately 2 and half years. She has not been taking any medications for her blood pressure or diabetes. Her A1c is greater than 14 in the office today. See below for further detail. Lipoma of right shoulder-has had for several years. Nontender, does not bother patient while dressing or wearing closed. Patient is advised she may seek surgical evaluation for second opinion she declines at this time. Diabetes   She presents for her follow-up diabetic visit. She has type 2 diabetes mellitus. The initial diagnosis of diabetes was made 12 years ago. Her disease course has been worsening. There are no hypoglycemic associated symptoms. Pertinent negatives for hypoglycemia include no dizziness or headaches. Pertinent negatives for diabetes include no chest pain, no fatigue, no foot paresthesias, no polydipsia, no polyphagia and no polyuria. There are no hypoglycemic complications. Symptoms are stable. Pertinent negatives for diabetic complications include no CVA, heart disease, nephropathy or peripheral neuropathy. Risk factors for coronary artery disease include diabetes mellitus, dyslipidemia, hypertension and obesity.  When asked about current treatments, none Medications:       Prior to Admission medications    Medication Sig Start Date End Date Taking? Authorizing Provider   Semaglutide (RYBELSUS) 3 MG TABS Take 1 tablet by mouth daily 7/22/20 8/21/20 Yes GIOVANI Peters CNP   Semaglutide 7 MG TABS Take 1 tablet by mouth daily 8/21/20 9/20/20 Yes GIOVANI Peters CNP   Semaglutide (RYBELSUS) 14 MG TABS Take 1 tablet by mouth daily 9/18/20 10/18/20 Yes GIOVANI Peters CNP   metFORMIN (GLUCOPHAGE-XR) 500 MG extended release tablet Take 2 tablets by mouth daily (with breakfast) 7/22/20  Yes GIOVANI Peters CNP   lisinopril (PRINIVIL;ZESTRIL) 10 MG tablet Take 1 tablet by mouth daily 7/22/20  Yes GIOVANI Peters CNP   nystatin (MYCOSTATIN) 432006 UNIT/GM powder Apply 3 times daily as needed for skin rash 7/17/20  Yes Nicole Marr MD   clotrimazole-betamethasone (LOTRISONE) 1-0.05 % cream Apply topically 2 times daily. 7/6/20  Yes Nicole Marr MD        Allergies:       Statins    Social History:     Tobacco:    reports that she has never smoked. She has never used smokeless tobacco.  Alcohol:      reports no history of alcohol use. Drug Use:  reports no history of drug use. Family History:     Family History   Problem Relation Age of Onset    Diabetes Father        Review of Systems:     Positive and Negative as described in HPI    Review of Systems   Constitutional: Negative for chills, fatigue and fever. HENT: Negative for congestion, rhinorrhea and sore throat. Eyes: Negative for visual disturbance. Respiratory: Negative for cough, shortness of breath and wheezing. Cardiovascular: Negative for chest pain and palpitations. Gastrointestinal: Negative for abdominal pain, constipation, diarrhea, nausea and vomiting. Endocrine: Negative for polydipsia, polyphagia and polyuria. Genitourinary: Negative for difficulty urinating and dysuria.    Musculoskeletal: Negative for gait problem, myalgias, neck pain and neck stiffness. Skin: Negative for rash. Neurological: Negative for dizziness, syncope, light-headedness and headaches. Physical Exam:   Vitals:  BP (!) 207/104 (Site: Right Upper Arm, Position: Sitting, Cuff Size: Large Adult)   Pulse 83   Temp 97.9 °F (36.6 °C) (Temporal)   Resp 18   Ht 5' 2\" (1.575 m)   Wt 208 lb 8 oz (94.6 kg)   BMI 38.14 kg/m²     Physical Exam  Vitals signs and nursing note reviewed. Constitutional:       General: She is not in acute distress. Appearance: Normal appearance. She is well-developed. She is obese. She is not ill-appearing. HENT:      Mouth/Throat:      Mouth: Mucous membranes are moist.   Eyes:      Conjunctiva/sclera: Conjunctivae normal.   Neck:      Musculoskeletal: Normal range of motion and neck supple. Cardiovascular:      Rate and Rhythm: Normal rate and regular rhythm. Heart sounds: No murmur. Pulmonary:      Effort: Pulmonary effort is normal.      Breath sounds: Normal breath sounds. No wheezing. Abdominal:      General: Bowel sounds are normal. There is no distension. Palpations: Abdomen is soft. Tenderness: There is no abdominal tenderness. Musculoskeletal:      Right lower leg: No edema. Left lower leg: No edema. Lymphadenopathy:      Cervical: No cervical adenopathy. Skin:     General: Skin is warm and dry. Findings: No rash. Neurological:      Mental Status: She is alert and oriented to person, place, and time.    Psychiatric:         Mood and Affect: Mood normal.         Behavior: Behavior normal.         Data:     Lab Results   Component Value Date     11/10/2017    K 4.6 11/10/2017    CL 96 11/10/2017    CO2 24 11/10/2017    BUN 24 11/10/2017    CREATININE 0.66 11/10/2017    GLUCOSE 263 11/10/2017    PROT 5.8 01/09/2015    LABALBU 4.3 01/27/2015    BILITOT 0.40 01/09/2015    ALKPHOS 57 01/09/2015    AST 22 11/10/2017    ALT 27 11/10/2017     Lab Results   Component Value Date    WBC 7.7 11/10/2017    RBC 5.24 11/10/2017    HGB 14.0 11/10/2017    HCT 43.6 11/10/2017    MCV 83.7 01/09/2015    MCH 27.6 01/09/2015    MCHC 33.0 01/09/2015    RDW 14.5 01/09/2015     11/10/2017    MPV 9.2 11/10/2017     Lab Results   Component Value Date    TSH 1.78 07/18/2016     Lab Results   Component Value Date    CHOL 271 11/10/2017    HDL 50 11/10/2017    LABA1C 14.0 07/22/2020       Assessment/Plan:      Diagnosis Orders   1. Uncontrolled type 2 diabetes mellitus with hyperglycemia (HCC)  POCT glycosylated hemoglobin (Hb A1C)    HM DIABETES FOOT EXAM    Semaglutide (RYBELSUS) 3 MG TABS    Semaglutide 7 MG TABS    Semaglutide (RYBELSUS) 14 MG TABS    metFORMIN (GLUCOPHAGE-XR) 500 MG extended release tablet    CBC Auto Differential    ALT    AST    Basic Metabolic Panel    Lipid Panel    Microalbumin, Ur   2. Essential hypertension  lisinopril (PRINIVIL;ZESTRIL) 10 MG tablet   3. Lipoma of right shoulder     4. Encounter for screening mammogram for breast cancer  JOSE MIGUEL DIGITAL SCREEN W OR WO CAD BILATERAL   5. Screening for colorectal cancer  POCT Fecal Immunochemical Test (FIT)     We will begin with metformin and oral semaglutide, we will also restart lisinopril at 10 mg daily. She will recheck her blood pressure in the office in 2 weeks. She may check at home and call us with readings. 1.  Wendy received counseling on the following healthy behaviors: nutrition, exercise and medication adherence  2. Patient given educational materials - see patient instructions  3. Was a self-tracking handout given in paper form or via [employer]? No  If yes, see orders or list here. 4.  Discussed use, benefit, and side effects of prescribed medications. Barriers to medication compliance addressed. All patient questions answered. Pt voiced understanding. 5.  Reviewed prior labs and health maintenance  6. Continue current medications, diet and exercise.     Completed Refills   Requested Prescriptions     Signed

## 2020-07-24 ENCOUNTER — TELEPHONE (OUTPATIENT)
Dept: PRIMARY CARE CLINIC | Age: 60
End: 2020-07-24

## 2020-07-24 ENCOUNTER — OFFICE VISIT (OUTPATIENT)
Dept: OBGYN | Age: 60
End: 2020-07-24
Payer: COMMERCIAL

## 2020-07-24 VITALS
SYSTOLIC BLOOD PRESSURE: 142 MMHG | HEIGHT: 62 IN | DIASTOLIC BLOOD PRESSURE: 72 MMHG | BODY MASS INDEX: 38.09 KG/M2 | WEIGHT: 207 LBS

## 2020-07-24 LAB
CONTROL: PRESENT
HEMOCCULT STL QL: POSITIVE

## 2020-07-24 PROCEDURE — 99213 OFFICE O/P EST LOW 20 MIN: CPT | Performed by: OBSTETRICS & GYNECOLOGY

## 2020-07-24 PROCEDURE — 82274 ASSAY TEST FOR BLOOD FECAL: CPT | Performed by: NURSE PRACTITIONER

## 2020-07-24 NOTE — PROGRESS NOTES
her clotrimazole-betamethasone, nystatin, Rybelsus, Semaglutide, Rybelsus, metFORMIN, and lisinopril. No follow-ups on file. There are no Patient Instructions on file for this visit. Over 50% of time spent on counseling and care coordination on: see assessment and plan,  She was also counseled on her preventative health maintenance recommendations and follow-up.         FF time: 15 min      Chandana Iverson,7/24/2020 11:13 AM

## 2020-07-24 NOTE — TELEPHONE ENCOUNTER
----- Message from GIOVANI Mao CNP sent at 7/24/2020 10:48 AM EDT -----  Fit test positive, colonoscopy ordered.   Thank you

## 2020-07-24 NOTE — TELEPHONE ENCOUNTER
Direct Endoscopy Referral       Fax to [phone] or call the [facility] Floyd at [address]  1960  [phone] There is no work phone number on file. [address]    Referring Provider: GIOVANI Carreon CNP   [address] [address] [phone]  Special Requests: PLEASE CALL AND SCHEDULE PATIENT    Check Requested Procedures:    [x]  Colonoscopy (Please check test type and diagnosis below)    []  CPT Code:  Screening Average Risk     []  CPT Code:  Screening High Risk                 []  CPT Code :  Diagnostic Colonoscopy 11795    []  Personal History of colon cancer (Date of surgery)               []  Personal History of colon polyps (Date of surgery)     []  Family history of colon cancer ([facility])     []  Family history of colon polyps (1st degree relative - [facility])     []  Abnormal barium enema or CT (Please attach report)    []  Change in bowel habits     []  Occult GI bleeding     []  Melena with negative EGD     []  Iron deficiency anemia    [x]  Other:positive FIT card        []  EGD (Please check test type and diagnosis below)     []  CPT Code: EGD 97241    []  Melena     []  Dyspepsia/GERD     []  Dysphagia     []  Epigastric pain unresponsive to treatment     []  Iron deficiency anemia with negative colonoscopy     []  Occult GI bleeding with negative colonoscopy     []  Abnormal UGI, x-ray, or CT (attach report)    [] Other:    HISTORY:  Past Medical History:   Diagnosis Date    Diabetes mellitus ([facility] [address]     Hepatitis A     Hyperlipidemia     Hypertension      Statins  Prior to Visit Medications    Medication Sig Taking?  Authorizing Provider   Semaglutide (RYBELSUS) 3 MG TABS Take 1 tablet by mouth daily  GIOVANI Moser CNP   Semaglutide 7 MG TABS Take 1 tablet by mouth daily  GIOVANI Moser CNP   Semaglutide (RYBELSUS) 14 MG TABS Take 1 tablet by mouth daily  GIOVANI Carreon CNP   metFORMIN (GLUCOPHAGE-XR) 500 MG extended release tablet Take 2 tablets by mouth daily (with breakfast)  GIOVANI Cheung CNP   lisinopril (PRINIVIL;ZESTRIL) 10 MG tablet Take 1 tablet by mouth daily  GIOVANI Moser CNP   nystatin (MYCOSTATIN) 224892 UNIT/GM powder Apply 3 times daily as needed for skin rash  Deborah Escoto MD   clotrimazole-betamethasone (LOTRISONE) 1-0.05 % cream Apply topically 2 times daily.   Deborah Escoto MD         Electronically signed by [address] [place] [address], APRN - CNP on 7/24/20 at 10:53 AM EDT

## 2020-07-24 NOTE — TELEPHONE ENCOUNTER
----- Message from [address] [place] [address], GIOVANI - CNP sent at 7/24/2020 10:48 AM EDT -----  Fit test positive, colonoscopy ordered.   Thank you

## 2020-07-24 NOTE — TELEPHONE ENCOUNTER
Informed patient of positive FIT card, informed of Dr. Domitila Murphy referral. Voices understandings.

## 2020-07-29 ENCOUNTER — TELEPHONE (OUTPATIENT)
Dept: PRIMARY CARE CLINIC | Age: 60
End: 2020-07-29

## 2020-07-29 NOTE — TELEPHONE ENCOUNTER
It is most likely her metformin causing these issues. Make sure she is taking the right also started empty stomach with 4 ounces of water 30 minutes prior to eating. May back metformin down to 500 mg with breakfast.  She should not be taking the medication at the same time.

## 2020-07-29 NOTE — TELEPHONE ENCOUNTER
Pt states she has been taking the Rybelsus correctly and had not started taking the 1000 mg of metformin she had only been taking the 500 mg of metformin 30 min after the Rybelsus. - - -

## 2020-07-29 NOTE — TELEPHONE ENCOUNTER
Pt started her Rybelsus last Friday and she was doing ok on it until yesterday morning she began belching rotten eggs, having explosive diarrhea and has nausea. Pt is still experiencing these symptoms and did not know if this was side effects of the medication.                        Health Maintenance   Topic Date Due    Diabetic microalbuminuria test  07/05/1977    Breast cancer screen  04/25/2018    Lipid screen  11/10/2018    Potassium monitoring  11/10/2018    Creatinine monitoring  11/10/2018    Diabetic retinal exam  07/22/2021 (Originally 8/8/2018)    Cervical cancer screen  07/22/2021 (Originally 2/4/2018)    Shingles Vaccine (1 of 2) 07/22/2021 (Originally 7/5/2009)    Flu vaccine (1) 09/01/2020    A1C test (Diabetic or Prediabetic)  10/22/2020    Diabetic foot exam  07/22/2021    Colon Cancer Screen FIT/FOBT  07/24/2021    DTaP/Tdap/Td vaccine (2 - Td) 02/14/2025    Pneumococcal 0-64 years Vaccine  Completed    Hepatitis C screen  Completed    HIV screen  Completed    Hepatitis A vaccine  Aged Out    Hib vaccine  Aged Out    Meningococcal (ACWY) vaccine  Aged Out             (applicable per patient's age: Cancer Screenings, Depression Screening, Fall Risk Screening, Immunizations)    Hemoglobin A1C (%)   Date Value   07/22/2020 14.0   11/06/2017 12.6   07/18/2016 7.4   07/18/2016 7.4     LDL Cholesterol (mg/dL)   Date Value   01/27/2015 123     LDL Calculated (mg/dL)   Date Value   11/10/2017 175 (A)     AST (U/L)   Date Value   11/10/2017 22     ALT (U/L)   Date Value   11/10/2017 27     BUN (mg/dL)   Date Value   11/10/2017 24      (goal A1C is < 7)   (goal LDL is <100) need 30-50% reduction from baseline     BP Readings from Last 3 Encounters:   07/24/20 (!) 142/72   07/22/20 (!) 140/98   07/17/20 138/76    (goal /80)      All Future Testing planned in [facility]:  Lab Frequency Next Occurrence   JOSE MIGUEL DIGITAL SCREEN W OR WO CAD BILATERAL Once 07/22/2020   CBC Auto Differential Once 07/22/2020   ALT Once 07/22/2020   AST Once 47/08/1186   Basic Metabolic Panel Once 66/33/5936   Lipid Panel Once 07/22/2020   Microalbumin, Ur Once 07/22/2020       Next Visit Date:  Future Appointments   Date Time Provider Abdullahi Harding   8/10/2020  4:40 PM GIOVANI Camara CNPf [facility]PP   8/19/2020  4:10 PM Molly Pradhan MD TIFF OB/GYN TPP   8/31/2020  3:30 PM [facility] MAMMOGRAPHY ROOM AT [facility] Rad   10/22/2020  3:00 PM GIOVANI Camara CNP [facility]            Patient Active Problem List:     Gastroenteritis, acute     Diabetes mellitus type 2 with complications, uncontrolled ([address]     HTN (hypertension)     Dyslipidemia     Essential hypertension     Uncontrolled type 2 diabetes mellitus with hyperglycemia ([address]

## 2020-07-29 NOTE — TELEPHONE ENCOUNTER
It is most likely due to the metformin and her A1c being so high. Symptoms should improve within a week or so.   I would like her to continue both medications if possible

## 2020-07-31 ENCOUNTER — TELEPHONE (OUTPATIENT)
Dept: PRIMARY CARE CLINIC | Age: 60
End: 2020-07-31

## 2020-07-31 ENCOUNTER — OFFICE VISIT (OUTPATIENT)
Dept: PRIMARY CARE CLINIC | Age: 60
End: 2020-07-31
Payer: COMMERCIAL

## 2020-07-31 ENCOUNTER — HOSPITAL ENCOUNTER (OUTPATIENT)
Dept: GENERAL RADIOLOGY | Age: 60
Discharge: HOME | End: 2020-08-02
Payer: COMMERCIAL

## 2020-07-31 ENCOUNTER — HOSPITAL ENCOUNTER (OUTPATIENT)
Age: 60
Discharge: HOME | End: 2020-08-02
Payer: COMMERCIAL

## 2020-07-31 ENCOUNTER — HOSPITAL ENCOUNTER (OUTPATIENT)
Age: 60
Discharge: HOME | End: 2020-07-31
Payer: COMMERCIAL

## 2020-07-31 VITALS
DIASTOLIC BLOOD PRESSURE: 76 MMHG | SYSTOLIC BLOOD PRESSURE: 145 MMHG | RESPIRATION RATE: 22 BRPM | TEMPERATURE: 97.6 F | BODY MASS INDEX: 37.37 KG/M2 | WEIGHT: 204.3 LBS | OXYGEN SATURATION: 97 % | HEART RATE: 99 BPM

## 2020-07-31 PROBLEM — R19.5 POSITIVE FIT (FECAL IMMUNOCHEMICAL TEST): Status: ACTIVE | Noted: 2020-07-31

## 2020-07-31 LAB
ABSOLUTE EOS #: 0.13 K/UL (ref 0–0.44)
ABSOLUTE IMMATURE GRANULOCYTE: 0.04 K/UL (ref 0–0.3)
ABSOLUTE LYMPH #: 1.84 K/UL (ref 1.1–3.7)
ABSOLUTE MONO #: 0.68 K/UL (ref 0.1–1.2)
ALT SERPL-CCNC: 60 U/L (ref 5–33)
ANION GAP SERPL CALCULATED.3IONS-SCNC: 12 MMOL/L (ref 9–17)
AST SERPL-CCNC: 31 U/L
BASOPHILS # BLD: 1 % (ref 0–2)
BASOPHILS ABSOLUTE: 0.06 K/UL (ref 0–0.2)
BUN BLDV-MCNC: 26 MG/DL (ref 8–23)
BUN/CREAT BLD: 29 (ref 9–20)
CALCIUM SERPL-MCNC: 10.7 MG/DL (ref 8.6–10.4)
CHLORIDE BLD-SCNC: 96 MMOL/L (ref 98–107)
CHOLESTEROL/HDL RATIO: 6.9
CHOLESTEROL: 305 MG/DL
CO2: 26 MMOL/L (ref 20–31)
CREAT SERPL-MCNC: 0.91 MG/DL (ref 0.5–0.9)
CREATININE URINE: 514.6 MG/DL (ref 28–217)
DIFFERENTIAL TYPE: ABNORMAL
EOSINOPHILS RELATIVE PERCENT: 2 % (ref 1–4)
GFR AFRICAN AMERICAN: >60 ML/MIN
GFR NON-AFRICAN AMERICAN: >60 ML/MIN
GFR SERPL CREATININE-BSD FRML MDRD: ABNORMAL ML/MIN/{1.73_M2}
GFR SERPL CREATININE-BSD FRML MDRD: ABNORMAL ML/MIN/{1.73_M2}
GLUCOSE BLD-MCNC: 328 MG/DL (ref 70–99)
HCT VFR BLD CALC: 51.4 % (ref 36.3–47.1)
HDLC SERPL-MCNC: 44 MG/DL
HEMOGLOBIN: 15.9 G/DL (ref 11.9–15.1)
IMMATURE GRANULOCYTES: 1 %
LDL CHOLESTEROL: 208 MG/DL (ref 0–130)
LYMPHOCYTES # BLD: 21 % (ref 24–43)
MCH RBC QN AUTO: 27.8 PG (ref 25.2–33.5)
MCHC RBC AUTO-ENTMCNC: 30.9 G/DL (ref 28.4–34.8)
MCV RBC AUTO: 89.9 FL (ref 82.6–102.9)
MICROALBUMIN/CREAT 24H UR: 968 MG/L
MICROALBUMIN/CREAT UR-RTO: 188 MCG/MG CREAT
MONOCYTES # BLD: 8 % (ref 3–12)
NRBC AUTOMATED: 0 PER 100 WBC
PDW BLD-RTO: 13.2 % (ref 11.8–14.4)
PLATELET # BLD: 257 K/UL (ref 138–453)
PLATELET ESTIMATE: ABNORMAL
PMV BLD AUTO: 10.2 FL (ref 8.1–13.5)
POTASSIUM SERPL-SCNC: 4.4 MMOL/L (ref 3.7–5.3)
RBC # BLD: 5.72 M/UL (ref 3.95–5.11)
RBC # BLD: ABNORMAL 10*6/UL
SEG NEUTROPHILS: 67 % (ref 36–65)
SEGMENTED NEUTROPHILS ABSOLUTE COUNT: 6.1 K/UL (ref 1.5–8.1)
SODIUM BLD-SCNC: 134 MMOL/L (ref 135–144)
TRIGL SERPL-MCNC: 264 MG/DL
VLDLC SERPL CALC-MCNC: ABNORMAL MG/DL (ref 1–30)
WBC # BLD: 8.9 K/UL (ref 3.5–11.3)
WBC # BLD: ABNORMAL 10*3/UL

## 2020-07-31 PROCEDURE — 74019 RADEX ABDOMEN 2 VIEWS: CPT

## 2020-07-31 PROCEDURE — 84460 ALANINE AMINO (ALT) (SGPT): CPT

## 2020-07-31 PROCEDURE — 84450 TRANSFERASE (AST) (SGOT): CPT

## 2020-07-31 PROCEDURE — 82043 UR ALBUMIN QUANTITATIVE: CPT

## 2020-07-31 PROCEDURE — 85025 COMPLETE CBC W/AUTO DIFF WBC: CPT

## 2020-07-31 PROCEDURE — 99214 OFFICE O/P EST MOD 30 MIN: CPT | Performed by: NURSE PRACTITIONER

## 2020-07-31 PROCEDURE — 80048 BASIC METABOLIC PNL TOTAL CA: CPT

## 2020-07-31 PROCEDURE — 82570 ASSAY OF URINE CREATININE: CPT

## 2020-07-31 PROCEDURE — 80061 LIPID PANEL: CPT

## 2020-07-31 PROCEDURE — 36415 COLL VENOUS BLD VENIPUNCTURE: CPT

## 2020-07-31 RX ORDER — SODIUM, POTASSIUM,MAG SULFATES 17.5-3.13G
SOLUTION, RECONSTITUTED, ORAL ORAL
Qty: 2 BOTTLE | Refills: 0 | Status: SHIPPED | OUTPATIENT
Start: 2020-07-31 | End: 2020-08-19 | Stop reason: ALTCHOICE

## 2020-07-31 RX ORDER — PROMETHAZINE HYDROCHLORIDE 25 MG/1
25 TABLET ORAL 3 TIMES DAILY PRN
Qty: 12 TABLET | Refills: 0 | Status: SHIPPED | OUTPATIENT
Start: 2020-07-31 | End: 2020-08-07

## 2020-07-31 RX ORDER — EZETIMIBE 10 MG/1
10 TABLET ORAL DAILY
Qty: 90 TABLET | Refills: 1 | Status: SHIPPED | OUTPATIENT
Start: 2020-07-31 | End: 2021-01-06

## 2020-07-31 ASSESSMENT — ENCOUNTER SYMPTOMS
CHEST TIGHTNESS: 0
WHEEZING: 0
DIARRHEA: 1
ABDOMINAL PAIN: 0
BLOATING: 1
SHORTNESS OF BREATH: 0
VOMITING: 1
BLOOD IN STOOL: 0
COUGH: 0
CHANGE IN BOWEL HABIT: 1
NAUSEA: 1

## 2020-07-31 NOTE — PATIENT INSTRUCTIONS
SURVEY:    You may be receiving a survey from [employer] regarding your visit today. Please complete the survey to enable us to provide the highest quality of care to you and your family. If you cannot score us a very good on any question, please call the office to discuss how we could have made your experience a very good one. Thank you.

## 2020-07-31 NOTE — TELEPHONE ENCOUNTER
----- Message from GIOVANI Jaquez - CNP sent at 7/31/2020  1:02 PM EDT -----  Please notify patient that she does appear a little dehydrated. I do think all her symptoms are related to the medication RYBELSUS. Make sure she is pushing fluids like we discussed in the office and have her contact us Monday to let us know how she is doing. Of note her cholesterol was significantly elevated on her blood work. I sent a prescription in for Zetia, have her start this medication when she is feeling better. I ordered a recheck on her cholesterol and liver to be done the Monday prior to her appointment with [facility].   Thank you

## 2020-07-31 NOTE — PROGRESS NOTES
Name: Edenilson Diaz  : 1960         Chief Complaint:     Chief Complaint   Patient presents with    Nausea & Vomiting     X 3 days    Diarrhea       History of Present Illness: Edenilson Diaz is a 63 y.o.  female who presents with Nausea & Vomiting (X 3 days) and Diarrhea      [facility] is here today for a routine office visit. She was started on rybelsus and metformin and ever since she has had nausea, vomiting and diarrhea. She denies any fever or chills. She cannot keep much of anything down at this time. She reports dizziness mostly when she is getting up. She has been vomitting mostly in the middle of the night but the diarrhea is throughout the day. She is taking 500 mg of the metformin in the morning. She feels \"very gasey\" and feels like her abdomen is tight. She reports when she is burping it smells \"like rotten eggs\". See below for further detail. Nausea & Vomiting   This is a new problem. The current episode started in the past 7 days. The problem occurs daily. The problem has been unchanged. Associated symptoms include anorexia, a change in bowel habit, fatigue, nausea and vomiting. Pertinent negatives include no abdominal pain, chest pain, chills, coughing, fever, headaches or myalgias. Exacerbated by: medication. She has tried lying down and rest for the symptoms. The treatment provided mild relief. Diarrhea    This is a new problem. The current episode started in the past 7 days. The problem has been unchanged. The stool consistency is described as watery. Associated symptoms include bloating and vomiting. Pertinent negatives include no abdominal pain, chills, coughing, fever, headaches or myalgias. She has tried nothing for the symptoms. There is no history of bowel resection or inflammatory bowel disease.          Past Medical History:     Past Medical History:   Diagnosis Date    Diabetes mellitus ([address]     Hepatitis A     Hyperlipidemia     Hypertension       Reviewed all patient instructions  3. Was a self-tracking handout given in paper form or via [employer]? No  If yes, see orders or list here. 4.  Discussed use, benefit, and side effects of prescribed medications. Barriers to medication compliance addressed. All patient questions answered. Pt voiced understanding. 5.  Reviewed prior labs and health maintenance  6. Continue current medications, diet and exercise. Completed Refills   Requested Prescriptions     Signed Prescriptions Disp Refills    SITagliptin (JANUVIA) 100 MG tablet 90 tablet 1     Sig: Take 1 tablet by mouth daily    promethazine (PHENERGAN) 25 MG tablet 12 tablet 0     Sig: Take 1 tablet by mouth 3 times daily as needed for Nausea    ezetimibe (ZETIA) 10 MG tablet 90 tablet 1     Sig: Take 1 tablet by mouth daily         Return if symptoms worsen or fail to improve.

## 2020-07-31 NOTE — TELEPHONE ENCOUNTER
Please have her come in today for an appointment.
BILATERAL Once 07/22/2020   CBC Auto Differential Once 08/06/2020   ALT Once 08/06/2020   AST Once 15/31/2510   Basic Metabolic Panel Once 47/45/0117   Lipid Panel Once 08/06/2020   Microalbumin, Ur Once 08/06/2020       Next Visit Date:  Future Appointments   Date Time Provider Abdullahi Harding   8/10/2020  4:40 PM Edyta Dumont APRN - CNP [facility]   8/19/2020  4:10 PM Leidy Lion MD TIFF OB/GYN [facility]   8/31/2020  3:30 PM [facility] MAMMOGRAPHY ROOM AT [facility] Rad   10/22/2020  3:00 PM GIOVANI Houston - CNP [facility]            Patient Active Problem List:     Gastroenteritis, acute     Diabetes mellitus type 2 with complications, uncontrolled ([address]     HTN (hypertension)     Dyslipidemia     Essential hypertension     Uncontrolled type 2 diabetes mellitus with hyperglycemia ([address]

## 2020-08-03 ENCOUNTER — APPOINTMENT (OUTPATIENT)
Dept: CT IMAGING | Age: 60
End: 2020-08-03
Payer: COMMERCIAL

## 2020-08-03 ENCOUNTER — HOSPITAL ENCOUNTER (EMERGENCY)
Age: 60
Discharge: HOME | End: 2020-08-03
Attending: EMERGENCY MEDICINE
Payer: COMMERCIAL

## 2020-08-03 ENCOUNTER — TELEPHONE (OUTPATIENT)
Dept: PRIMARY CARE CLINIC | Age: 60
End: 2020-08-03

## 2020-08-03 ENCOUNTER — APPOINTMENT (OUTPATIENT)
Dept: GENERAL RADIOLOGY | Age: 60
End: 2020-08-03
Payer: COMMERCIAL

## 2020-08-03 VITALS
DIASTOLIC BLOOD PRESSURE: 61 MMHG | HEIGHT: 62 IN | BODY MASS INDEX: 37.54 KG/M2 | RESPIRATION RATE: 20 BRPM | HEART RATE: 77 BPM | SYSTOLIC BLOOD PRESSURE: 152 MMHG | TEMPERATURE: 98.7 F | OXYGEN SATURATION: 94 % | WEIGHT: 204 LBS

## 2020-08-03 PROBLEM — R51.9 ACUTE NONINTRACTABLE HEADACHE: Status: ACTIVE | Noted: 2020-08-03

## 2020-08-03 LAB
ABSOLUTE EOS #: 0.15 K/UL (ref 0–0.44)
ABSOLUTE IMMATURE GRANULOCYTE: 0.04 K/UL (ref 0–0.3)
ABSOLUTE LYMPH #: 1.89 K/UL (ref 1.1–3.7)
ABSOLUTE MONO #: 0.53 K/UL (ref 0.1–1.2)
ANION GAP SERPL CALCULATED.3IONS-SCNC: 11 MMOL/L (ref 9–17)
BASOPHILS # BLD: 1 % (ref 0–2)
BASOPHILS ABSOLUTE: 0.06 K/UL (ref 0–0.2)
BUN BLDV-MCNC: 17 MG/DL (ref 8–23)
BUN/CREAT BLD: 21 (ref 9–20)
CALCIUM SERPL-MCNC: 9.9 MG/DL (ref 8.6–10.4)
CHLORIDE BLD-SCNC: 100 MMOL/L (ref 98–107)
CO2: 25 MMOL/L (ref 20–31)
CREAT SERPL-MCNC: 0.82 MG/DL (ref 0.5–0.9)
DIFFERENTIAL TYPE: ABNORMAL
EKG ATRIAL RATE: 88 BPM
EKG P AXIS: 21 DEGREES
EKG P-R INTERVAL: 130 MS
EKG Q-T INTERVAL: 386 MS
EKG QRS DURATION: 80 MS
EKG QTC CALCULATION (BAZETT): 467 MS
EKG R AXIS: 0 DEGREES
EKG T AXIS: 51 DEGREES
EKG VENTRICULAR RATE: 88 BPM
EOSINOPHILS RELATIVE PERCENT: 2 % (ref 1–4)
GFR AFRICAN AMERICAN: >60 ML/MIN
GFR NON-AFRICAN AMERICAN: >60 ML/MIN
GFR SERPL CREATININE-BSD FRML MDRD: ABNORMAL ML/MIN/{1.73_M2}
GFR SERPL CREATININE-BSD FRML MDRD: ABNORMAL ML/MIN/{1.73_M2}
GLUCOSE BLD-MCNC: 243 MG/DL
GLUCOSE BLD-MCNC: 243 MG/DL (ref 74–100)
GLUCOSE BLD-MCNC: 292 MG/DL (ref 70–99)
HCT VFR BLD CALC: 46.8 % (ref 36.3–47.1)
HEMOGLOBIN: 14.6 G/DL (ref 11.9–15.1)
IMMATURE GRANULOCYTES: 1 %
LYMPHOCYTES # BLD: 25 % (ref 24–43)
MCH RBC QN AUTO: 27.5 PG (ref 25.2–33.5)
MCHC RBC AUTO-ENTMCNC: 31.2 G/DL (ref 28.4–34.8)
MCV RBC AUTO: 88.3 FL (ref 82.6–102.9)
MONOCYTES # BLD: 7 % (ref 3–12)
NRBC AUTOMATED: 0 PER 100 WBC
PDW BLD-RTO: 13.2 % (ref 11.8–14.4)
PLATELET # BLD: 208 K/UL (ref 138–453)
PLATELET ESTIMATE: ABNORMAL
PMV BLD AUTO: 9.9 FL (ref 8.1–13.5)
POTASSIUM SERPL-SCNC: 4.2 MMOL/L (ref 3.7–5.3)
RBC # BLD: 5.3 M/UL (ref 3.95–5.11)
RBC # BLD: ABNORMAL 10*6/UL
SEG NEUTROPHILS: 64 % (ref 36–65)
SEGMENTED NEUTROPHILS ABSOLUTE COUNT: 4.97 K/UL (ref 1.5–8.1)
SODIUM BLD-SCNC: 136 MMOL/L (ref 135–144)
TROPONIN INTERP: NORMAL
TROPONIN T: NORMAL NG/ML
TROPONIN, HIGH SENSITIVITY: 14 NG/L (ref 0–14)
WBC # BLD: 7.6 K/UL (ref 3.5–11.3)
WBC # BLD: ABNORMAL 10*3/UL

## 2020-08-03 PROCEDURE — 99284 EMERGENCY DEPT VISIT MOD MDM: CPT

## 2020-08-03 PROCEDURE — 70450 CT HEAD/BRAIN W/O DYE: CPT

## 2020-08-03 PROCEDURE — 71045 X-RAY EXAM CHEST 1 VIEW: CPT

## 2020-08-03 PROCEDURE — 96375 TX/PRO/DX INJ NEW DRUG ADDON: CPT

## 2020-08-03 PROCEDURE — 6360000002 HC RX W HCPCS: Performed by: EMERGENCY MEDICINE

## 2020-08-03 PROCEDURE — 80048 BASIC METABOLIC PNL TOTAL CA: CPT

## 2020-08-03 PROCEDURE — 93005 ELECTROCARDIOGRAM TRACING: CPT | Performed by: EMERGENCY MEDICINE

## 2020-08-03 PROCEDURE — 6370000000 HC RX 637 (ALT 250 FOR IP): Performed by: EMERGENCY MEDICINE

## 2020-08-03 PROCEDURE — 85025 COMPLETE CBC W/AUTO DIFF WBC: CPT

## 2020-08-03 PROCEDURE — 82947 ASSAY GLUCOSE BLOOD QUANT: CPT

## 2020-08-03 PROCEDURE — 84484 ASSAY OF TROPONIN QUANT: CPT

## 2020-08-03 PROCEDURE — 93010 ELECTROCARDIOGRAM REPORT: CPT | Performed by: FAMILY MEDICINE

## 2020-08-03 PROCEDURE — 96374 THER/PROPH/DIAG INJ IV PUSH: CPT

## 2020-08-03 RX ORDER — DIPHENHYDRAMINE HYDROCHLORIDE 50 MG/ML
25 INJECTION INTRAMUSCULAR; INTRAVENOUS ONCE
Status: COMPLETED | OUTPATIENT
Start: 2020-08-03 | End: 2020-08-03

## 2020-08-03 RX ORDER — KETOROLAC TROMETHAMINE 15 MG/ML
15 INJECTION, SOLUTION INTRAMUSCULAR; INTRAVENOUS ONCE
Status: COMPLETED | OUTPATIENT
Start: 2020-08-03 | End: 2020-08-03

## 2020-08-03 RX ORDER — LISINOPRIL 5 MG/1
10 TABLET ORAL ONCE
Status: COMPLETED | OUTPATIENT
Start: 2020-08-03 | End: 2020-08-03

## 2020-08-03 RX ORDER — LABETALOL 100 MG/1
100 TABLET, FILM COATED ORAL ONCE
Status: COMPLETED | OUTPATIENT
Start: 2020-08-03 | End: 2020-08-03

## 2020-08-03 RX ORDER — LISINOPRIL 20 MG/1
20 TABLET ORAL DAILY
Qty: 30 TABLET | Refills: 1 | Status: SHIPPED | OUTPATIENT
Start: 2020-08-03 | End: 2020-08-10 | Stop reason: DRUGHIGH

## 2020-08-03 RX ORDER — PROCHLORPERAZINE EDISYLATE 5 MG/ML
10 INJECTION INTRAMUSCULAR; INTRAVENOUS ONCE
Status: COMPLETED | OUTPATIENT
Start: 2020-08-03 | End: 2020-08-03

## 2020-08-03 RX ORDER — LABETALOL 100 MG/1
100 TABLET, FILM COATED ORAL 2 TIMES DAILY
Qty: 60 TABLET | Refills: 1 | Status: SHIPPED | OUTPATIENT
Start: 2020-08-03 | End: 2020-10-06 | Stop reason: SDUPTHER

## 2020-08-03 RX ORDER — METHYLPREDNISOLONE SODIUM SUCCINATE 125 MG/2ML
125 INJECTION, POWDER, LYOPHILIZED, FOR SOLUTION INTRAMUSCULAR; INTRAVENOUS ONCE
Status: COMPLETED | OUTPATIENT
Start: 2020-08-03 | End: 2020-08-03

## 2020-08-03 RX ADMIN — METHYLPREDNISOLONE SODIUM SUCCINATE 125 MG: 125 INJECTION, POWDER, FOR SOLUTION INTRAMUSCULAR; INTRAVENOUS at 17:00

## 2020-08-03 RX ADMIN — KETOROLAC TROMETHAMINE 15 MG: 15 INJECTION, SOLUTION INTRAMUSCULAR; INTRAVENOUS at 16:53

## 2020-08-03 RX ADMIN — PROCHLORPERAZINE EDISYLATE 10 MG: 5 INJECTION INTRAMUSCULAR; INTRAVENOUS at 16:51

## 2020-08-03 RX ADMIN — DIPHENHYDRAMINE HYDROCHLORIDE 25 MG: 50 INJECTION, SOLUTION INTRAMUSCULAR; INTRAVENOUS at 16:50

## 2020-08-03 RX ADMIN — LISINOPRIL 10 MG: 5 TABLET ORAL at 16:57

## 2020-08-03 RX ADMIN — LABETALOL HCL 100 MG: 100 TABLET, FILM COATED ORAL at 16:58

## 2020-08-03 ASSESSMENT — ENCOUNTER SYMPTOMS
CHEST TIGHTNESS: 0
COUGH: 0
CONSTIPATION: 0
COLOR CHANGE: 0
ABDOMINAL DISTENTION: 0
TROUBLE SWALLOWING: 0
DIARRHEA: 0
WHEEZING: 0
SHORTNESS OF BREATH: 0
NAUSEA: 0
ABDOMINAL PAIN: 0
BACK PAIN: 0
VOMITING: 0

## 2020-08-03 ASSESSMENT — PAIN DESCRIPTION - FREQUENCY: FREQUENCY: CONTINUOUS

## 2020-08-03 ASSESSMENT — PAIN DESCRIPTION - LOCATION: LOCATION: EYE

## 2020-08-03 ASSESSMENT — PAIN DESCRIPTION - ORIENTATION: ORIENTATION: LEFT

## 2020-08-03 ASSESSMENT — PAIN SCALES - GENERAL
PAINLEVEL_OUTOF10: 3
PAINLEVEL_OUTOF10: 4

## 2020-08-03 ASSESSMENT — PAIN DESCRIPTION - PAIN TYPE: TYPE: ACUTE PAIN

## 2020-08-03 ASSESSMENT — PAIN DESCRIPTION - DESCRIPTORS: DESCRIPTORS: PRESSURE

## 2020-08-03 NOTE — TELEPHONE ENCOUNTER
If she is having any increase in weakness, difficulty speaking,  numbness tingling,  etc. she needs to go to the emergency department. This is likely do to her high blood pressure and significantly elevated blood sugars. Was she able to obtain Januvia that was prescribed? Ask her if she has restarted taking the metformin. Make sure she is taking lisinopril.  Thank you

## 2020-08-03 NOTE — ED PROVIDER NOTES
[facility] ED  EMERGENCY DEPARTMENT ENCOUNTER      Pt Name:Wendy Alanis  MRN: [id number]  Birthdate 1960  Date of evaluation: 8/3/2020  Provider: Joaquin Jackson MD    CHIEF COMPLAINT     Chief Complaint   Patient presents with    Headache     onset saturday, reports bilat blurred vision at onset    Hypertension     ongoing for approx 3 years, started treatment 2 weeks ago         HISTORY OF PRESENT ILLNESS   (Location/Symptom, Timing/Onset, Context/Setting, Quality, Duration, Modifying Factors, Severity)  Note limiting factors. HPI the patient is a 69-year-old female who presents with hypertension. She states her blood pressure has been elevated for approximately 3 years. About 2 weeks ago she started lisinopril 10 mg. Her blood pressure has not been controlled as of yet. She also has a headache which started 2 days ago. It is a pressure which is at a level 3. It can radiate towards her ear which causes a sharp pain. She has had no chest pain or palpitations. No nausea, vomiting or diarrhea. She was treated for diarrhea a couple weeks ago. Nursing Notes were reviewed. REVIEW OF SYSTEMS    (2-9 systems for level 4, 10 or more for level 5)     Review of Systems   Constitutional: Positive for activity change. HENT: Negative for congestion and trouble swallowing. Eyes: Positive for visual disturbance. Eyes are blurry. Left is worse than the right. Respiratory: Negative for cough, chest tightness, shortness of breath and wheezing. Cardiovascular: Negative for chest pain, palpitations and leg swelling. Gastrointestinal: Negative for abdominal distention, abdominal pain, constipation, diarrhea, nausea and vomiting. Genitourinary: Negative for difficulty urinating, dysuria and frequency. Musculoskeletal: Negative for back pain and gait problem. Skin: Negative for color change. Neurological: Positive for headaches.  Negative for dizziness, tremors, seizures, syncope, facial asymmetry, speech difficulty, weakness, light-headedness and numbness. Psychiatric/Behavioral: Negative for confusion, decreased concentration and dysphoric mood. MEDICAL HISTORY     Past Medical History:   Diagnosis Date    Diabetes mellitus ([address]     Hepatitis A     Hyperlipidemia     Hypertension          SURGICAL HISTORY       Past Surgical History:   Procedure Laterality Date    APPENDECTOMY      CHOLECYSTECTOMY      HYSTERECTOMY      TUBAL LIGATION           CURRENT MEDICATIONS       Previous Medications    CLOTRIMAZOLE-BETAMETHASONE (LOTRISONE) 1-0.05 % CREAM    Apply topically 2 times daily.     EZETIMIBE (ZETIA) 10 MG TABLET    Take 1 tablet by mouth daily    LISINOPRIL (PRINIVIL;ZESTRIL) 10 MG TABLET    Take 1 tablet by mouth daily    METFORMIN (GLUCOPHAGE-XR) 500 MG EXTENDED RELEASE TABLET    Take 2 tablets by mouth daily (with breakfast)    NA SULFATE-K SULFATE-MG SULF (SUPREP BOWEL PREP KIT) 17.5-3.13-1.6 GM/177ML SOLN    Take as directed    NYSTATIN (MYCOSTATIN) 019179 UNIT/GM POWDER    Apply 3 times daily as needed for skin rash    PROMETHAZINE (PHENERGAN) 25 MG TABLET    Take 1 tablet by mouth 3 times daily as needed for Nausea    SITAGLIPTIN (JANUVIA) 100 MG TABLET    Take 1 tablet by mouth daily       ALLERGIES     Statins    FAMILY HISTORY       Family History   Problem Relation Age of Onset    Diabetes Father           SOCIAL HISTORY       Social History     Socioeconomic History    Marital status:      Spouse name: None    Number of children: None    Years of education: None    Highest education level: None   Occupational History    None   Social Needs    Financial resource strain: None    Food insecurity     Worry: None     Inability: None    Transportation needs     Medical: None     Non-medical: None   Tobacco Use    Smoking status: Never Smoker    Smokeless tobacco: Never Used   Substance and Sexual Activity    Alcohol use: Palpations: Abdomen is soft. Musculoskeletal: Normal range of motion. General: No swelling or tenderness. Skin:     General: Skin is warm and dry. Neurological:      General: No focal deficit present. Mental Status: She is alert and oriented to person, place, and time. Mental status is at baseline. Cranial Nerves: No cranial nerve deficit. Sensory: No sensory deficit. Motor: No weakness. Coordination: Coordination normal.   Psychiatric:         Mood and Affect: Mood normal.         Behavior: Behavior normal.         Thought Content: Thought content normal.         Judgment: Judgment normal.         DIAGNOSTIC RESULTS     EKG: All EKG's are interpreted by the Emergency Department Physician whoeither signs or Co-signs this chart in the absence of a cardiologist.      RADIOLOGY:   Non-plain film images such as CT, Ultrasound and MRI are read by the radiologist. Plain radiographic images are visualized and preliminarily interpreted by the emergency physician     Interpretation per the Radiologist below, if available at the time of this note:    CT HEAD WO CONTRAST   Final Result   No acute intracranial abnormality. XR CHEST 1 VIEW   Final Result   No acute cardiopulmonary disease. Borderline heart size.                ED BEDSIDE ULTRASOUND:   Performed by ED Physician - none    LABS:  Labs Reviewed   CBC WITH AUTO DIFFERENTIAL - Abnormal; Notable for the following components:       Result Value    RBC 5.30 (*)     Immature Granulocytes 1 (*)     All other components within normal limits   BASIC METABOLIC PANEL - Abnormal; Notable for the following components:    Glucose 292 (*)     Bun/Cre Ratio 21 (*)     All other components within normal limits   GLUCOSE, WHOLE BLOOD - Abnormal; Notable for the following components:    POC Glucose 243 (*)     All other components within normal limits   POCT GLUCOSE - Normal   TROPONIN       EMERGENCY DEPARTMENT COURSE and DIFFERENTIAL DIAGNOSIS/MDM:   Vitals:    Vitals:    08/03/20 1701 08/03/20 1724 08/03/20 1731 08/03/20 1741   BP: (!) 185/79 (!) 150/63 137/61 (!) 158/66   Pulse: 79 79 76 78   Resp:       Temp:       TempSrc:       SpO2: 96% 94% 95% 92%   Weight:       Height:               MDM I have increased patient's lisinopril and started labetalol. Her blood pressures under much better control in the ED. She is strongly been encouraged to watch her diet and her blood sugar. I am certain her blurred vision is coming from her elevated blood sugar. CONSULTS:  None    PROCEDURES:  Unless otherwise noted below, none     Procedures    FINAL IMPRESSION      1. Essential hypertension    2.  Acute nonintractable headache, unspecified headache type          DISPOSITION/PLAN   DISPOSITION Decision To Discharge 08/03/2020 05:57:26 PM      PATIENT REFERRED TO:  Mariella Dumont, GIOVANI [facility]  [phone]      Recheck in about 2 weeks      DISCHARGE MEDICATIONS:  New Prescriptions    LABETALOL (NORMODYNE) 100 MG TABLET    Take 1 tablet by mouth 2 times daily    LISINOPRIL (PRINIVIL;ZESTRIL) 20 MG TABLET    Take 1 tablet by mouth daily              (Please note that portions ofthis note were completed with a voice recognition program.  Efforts were made to edit the dictations but occasionally words are mis-transcribed.)      Lisa Lange MD (electronically signed)  Attending Emergency Physician          Kristopher Reyna MD  08/03/20 1042

## 2020-08-03 NOTE — ED NOTES
Discharge education reviewed with patient, he verbalized understanding of need to follow- up with PCP and understanding of prescription medications. Patient denies further questions at this time.       Brianna Powell RN  08/03/20 0528

## 2020-08-04 ENCOUNTER — TELEPHONE (OUTPATIENT)
Dept: PRIMARY CARE CLINIC | Age: 60
End: 2020-08-04

## 2020-08-04 NOTE — TELEPHONE ENCOUNTER
[facility] Transitions Initial Follow Up Call    Outreach made within 2 business days of discharge: Yes     Patient: Ermelinda Cedeno Patient : 1960   MRN: [id number]  Reason for Admission: There are no discharge diagnoses documented for the most recent discharge. Discharge Date: 8/3/20       Spoke with: patient    Discharge department/facility: [facility] ER    TCM Interactive Patient Contact:  Was patient able to fill all prescriptions: Yes  Was patient instructed to bring all medications to the follow-up visit: Yes  Is patient taking all medications as directed in the discharge summary?  Yes  Does patient understand their discharge instructions: Yes  Does patient have questions or concerns that need addressed prior to 7-14 day follow up office visit: no    Scheduled appointment with PCP within 7-14 days    Follow Up  Future Appointments   Date Time Provider Abdullahi Harding   8/10/2020  4:40 PM GIOVANI Bullard CNPf [facility]   2020  4:10 PM MD KRISTEN RangelF OB/GYN [facility]   2020  3:30 PM Mac BROWN Rad   10/22/2020  3:00 PM GIOVANI Bullard CNP [facility]       Rich Rimes

## 2020-08-10 ENCOUNTER — OFFICE VISIT (OUTPATIENT)
Dept: PRIMARY CARE CLINIC | Age: 60
End: 2020-08-10
Payer: COMMERCIAL

## 2020-08-10 VITALS
WEIGHT: 205.7 LBS | HEIGHT: 62 IN | RESPIRATION RATE: 16 BRPM | SYSTOLIC BLOOD PRESSURE: 158 MMHG | BODY MASS INDEX: 37.85 KG/M2 | HEART RATE: 76 BPM | DIASTOLIC BLOOD PRESSURE: 80 MMHG | TEMPERATURE: 97.6 F

## 2020-08-10 PROCEDURE — 99214 OFFICE O/P EST MOD 30 MIN: CPT | Performed by: NURSE PRACTITIONER

## 2020-08-10 ASSESSMENT — ENCOUNTER SYMPTOMS
ORTHOPNEA: 0
BLURRED VISION: 0
CONSTIPATION: 0
SHORTNESS OF BREATH: 0
COLOR CHANGE: 0
ABDOMINAL PAIN: 0
COUGH: 0
DIARRHEA: 0
VOMITING: 0
RHINORRHEA: 0
SORE THROAT: 0
WHEEZING: 0
NAUSEA: 0

## 2020-08-10 NOTE — PATIENT INSTRUCTIONS
SURVEY:    You may be receiving a survey from [employer] regarding your visit today. Please complete the survey to enable us to provide the highest quality of care to you and your family. If you cannot score us a very good on any question, please call the office to discuss how we could have made your experience a very good one. Thank you. Patient Education        Arm Pain: Care Instructions  Your Care Instructions     You can hurt your arm by using it too much or by injuring it. Biking, wrestling, and home repair projects are examples of activities that can lead to arm pain. Everyday wear and tear, especially as you get older, can cause arm pain. Your forearms, wrists, hands, and fingers are the parts of your arm that are most likely to become painful. A minor arm injury usually will heal on its own with home treatment to relieve swelling and pain. If you have a more serious injury, you may need tests and treatment. Follow-up care is a key part of your treatment and safety. Be sure to make and go to all appointments, and call your doctor if you are having problems. It's also a good idea to know your test results and keep a list of the medicines you take. How can you care for yourself at home? · Take pain medicines exactly as directed. ? If the doctor gave you a prescription medicine for pain, take it as prescribed. ? If you are not taking a prescription pain medicine, ask your doctor if you can take an over-the-counter medicine. · Rest and protect your arm. Take a break from any activity that may cause pain. · Put ice or a cold pack on your arm for 10 to 20 minutes at a time. Put a thin cloth between the ice and your skin. · Prop up the sore arm on a pillow when you ice it or anytime you sit or lie down during the next 3 days. Try to keep it above the level of your heart. This will help reduce swelling. · If your doctor recommends a sling to support your arm, wear it as directed.   When should you call for help? [id number] anytime you think you may need emergency care. For example, call if:  · Your arm or hand is cool or pale or changes color. Call your doctor now or seek immediate medical care if:  · You cannot use your arm. · You have signs of infection, such as:  ? Increased pain, swelling, warmth, or redness. ? Red streaks running up or down your arm. ? Pus draining from an area of your arm. ? A fever. · You have tingling, weakness, or numbness in your arm. Watch closely for changes in your health, and be sure to contact your doctor if:  · You do not get better as expected. Where can you learn more? Go to [email or web address].com. org and sign in to your [employer] account. Enter B641 in the [employer] box to learn more about \"Arm Pain: Care Instructions. \"     If you do not have an account, please click on the \"Sign Up Now\" link. Current as of: June 26, 2019               Content Version: 12.5  © 9528-1876 Healthwise, Incorporated. Care instructions adapted under license by [facility] ([facility]). If you have questions about a medical condition or this instruction, always ask your healthcare professional. [place] [address] any warranty or liability for your use of this information.

## 2020-08-10 NOTE — LETTER
[facility]  [address]  [place] [address]  Phone: [phone]  Fax: [phone] [facility], APRN - CNP        August 10, 2020     Patient: Sin Kumar   YOB: 1960   Date of Visit: 8/10/2020       To Whom It May Concern: It is my medical opinion that Maria De Jesus Martin should remain out of work until 08/17/2020. If you have any questions or concerns, please don't hesitate to call.     Sincerely,        Lubna Dumont, APRN - CNP

## 2020-08-10 NOTE — PROGRESS NOTES
History:     Past Surgical History:   Procedure Laterality Date    APPENDECTOMY      CHOLECYSTECTOMY      HYSTERECTOMY      TUBAL LIGATION          Medications:       Prior to Admission medications    Medication Sig Start Date End Date Taking? Authorizing Provider   verapamil (CALAN SR) 120 MG extended release tablet Take 1 tablet by mouth daily 8/10/20  Yes GIOVANI Marley CNP   labetalol (NORMODYNE) 100 MG tablet Take 1 tablet by mouth 2 times daily 8/3/20  Yes Larry Lainez MD   SITagliptin (JANUVIA) 100 MG tablet Take 1 tablet by mouth daily 7/31/20  Yes GIOVANI Painter CNP   ezetimibe (ZETIA) 10 MG tablet Take 1 tablet by mouth daily 7/31/20  Yes GIOVANI Painter CNP   lisinopril (PRINIVIL;ZESTRIL) 10 MG tablet Take 1 tablet by mouth daily  Patient taking differently: Take 10 mg by mouth daily Takes 2 daily 7/22/20  Yes GIOVANI Marley CNP   clotrimazole-betamethasone (LOTRISONE) 1-0.05 % cream Apply topically 2 times daily. 7/6/20  Yes Austin Carlisle MD   Na Sulfate-K Sulfate-Mg Sulf (SUPREP BOWEL PREP KIT) 17.5-3.13-1.6 GM/177ML SOLN Take as directed  Patient not taking: Reported on 8/10/2020 7/31/20   Anival Tabor MD        Allergies:       Statins    Social History:     Tobacco:    reports that she has never smoked. She has never used smokeless tobacco.  Alcohol:      reports no history of alcohol use. Drug Use:  reports no history of drug use. Family History:     Family History   Problem Relation Age of Onset    Diabetes Father        Review of Systems:     Positive and Negative as described in HPI    Review of Systems   Constitutional: Negative for chills, fatigue, fever and malaise/fatigue. HENT: Negative for congestion, rhinorrhea and sore throat. Eyes: Negative for blurred vision and visual disturbance. Respiratory: Negative for cough, shortness of breath and wheezing. Cardiovascular: Negative for chest pain, palpitations, orthopnea and PND. Gastrointestinal: Negative for abdominal pain, constipation, diarrhea, nausea and vomiting. Genitourinary: Negative for difficulty urinating and dysuria. Musculoskeletal: Negative for gait problem, neck pain and neck stiffness. Skin: Positive for wound (swelling in left AC). Negative for color change and rash. Neurological: Positive for headaches (resolved). Negative for dizziness, syncope and light-headedness. Physical Exam:   Vitals:  BP (!) 158/80 (Site: Right Upper Arm, Position: Sitting, Cuff Size: Large Adult) Comment: Manual  Pulse 76   Temp 97.6 °F (36.4 °C) (Temporal)   Resp 16   Ht 5' 2\" (1.575 m)   Wt 205 lb 11.2 oz (93.3 kg)   BMI 37.62 kg/m²     Physical Exam  Vitals signs and nursing note reviewed. Constitutional:       General: She is not in acute distress. Appearance: Normal appearance. She is well-developed. She is obese. She is not ill-appearing. HENT:      Mouth/Throat:      Mouth: Mucous membranes are moist.   Eyes:      Conjunctiva/sclera: Conjunctivae normal.   Neck:      Musculoskeletal: Normal range of motion and neck supple. Cardiovascular:      Rate and Rhythm: Normal rate and regular rhythm. Heart sounds: No murmur. Pulmonary:      Effort: Pulmonary effort is normal.      Breath sounds: Normal breath sounds. No wheezing. Abdominal:      General: Bowel sounds are normal. There is no distension. Palpations: Abdomen is soft. Tenderness: There is no abdominal tenderness. Musculoskeletal:      Right lower leg: No edema. Left lower leg: No edema. Lymphadenopathy:      Cervical: No cervical adenopathy. Skin:     General: Skin is warm and dry. Findings: No rash. Neurological:      Mental Status: She is alert and oriented to person, place, and time.    Psychiatric:         Mood and Affect: Mood normal.         Behavior: Behavior normal.         Data:     Lab Results   Component Value Date     08/03/2020    K 4.2 08/03/2020     08/03/2020    CO2 25 08/03/2020    BUN 17 08/03/2020    CREATININE 0.82 08/03/2020    GLUCOSE 243 08/03/2020    PROT 5.8 01/09/2015    LABALBU 4.3 01/27/2015    BILITOT 0.40 01/09/2015    ALKPHOS 57 01/09/2015    AST 31 07/31/2020    ALT 60 07/31/2020     Lab Results   Component Value Date    WBC 7.6 08/03/2020    RBC 5.30 08/03/2020    HGB 14.6 08/03/2020    HCT 46.8 08/03/2020    MCV 88.3 08/03/2020    MCH 27.5 08/03/2020    MCHC 31.2 08/03/2020    RDW 13.2 08/03/2020     08/03/2020    MPV 9.9 08/03/2020     Lab Results   Component Value Date    TSH 1.78 07/18/2016     Lab Results   Component Value Date    CHOL 305 07/31/2020    CHOL 271 11/10/2017    HDL 44 07/31/2020    LABA1C 14.0 07/22/2020       Assessment/Plan:      Diagnosis Orders   1. Essential hypertension  verapamil (CALAN SR) 120 MG extended release tablet   2. Hematoma of intravenous catheter site, initial encounter ([address]       Continue labetalol, add verapamil, recheck BP in 2 weeks. Off work rest of week until hematoma resolved. RTO if any worse. 1.  Wendy received counseling on the following healthy behaviors: nutrition, exercise and medication adherence  2. Patient given educational materials - see patient instructions  3. Was a self-tracking handout given in paper form or via [employer]? No  If yes, see orders or list here. 4.  Discussed use, benefit, and side effects of prescribed medications. Barriers to medication compliance addressed. All patient questions answered. Pt voiced understanding. 5.  Reviewed prior labs and health maintenance  6. Continue current medications, diet and exercise. Completed Refills   Requested Prescriptions     Signed Prescriptions Disp Refills    verapamil (CALAN SR) 120 MG extended release tablet 90 tablet 0     Sig: Take 1 tablet by mouth daily         Return if symptoms worsen or fail to improve.

## 2020-08-14 ENCOUNTER — TELEPHONE (OUTPATIENT)
Dept: PRIMARY CARE CLINIC | Age: 60
End: 2020-08-14

## 2020-08-14 ENCOUNTER — OFFICE VISIT (OUTPATIENT)
Dept: PRIMARY CARE CLINIC | Age: 60
End: 2020-08-14
Payer: COMMERCIAL

## 2020-08-14 VITALS
DIASTOLIC BLOOD PRESSURE: 56 MMHG | RESPIRATION RATE: 18 BRPM | BODY MASS INDEX: 37.55 KG/M2 | SYSTOLIC BLOOD PRESSURE: 94 MMHG | TEMPERATURE: 97.8 F | WEIGHT: 205.3 LBS | OXYGEN SATURATION: 99 % | HEART RATE: 63 BPM

## 2020-08-14 PROCEDURE — 99213 OFFICE O/P EST LOW 20 MIN: CPT | Performed by: NURSE PRACTITIONER

## 2020-08-14 RX ORDER — ORAL SEMAGLUTIDE 3 MG/1
1 TABLET ORAL DAILY
Qty: 30 TABLET | Refills: 0
Start: 2020-09-14 | End: 2020-09-15

## 2020-08-14 RX ORDER — ORAL SEMAGLUTIDE 14 MG/1
1 TABLET ORAL DAILY
Qty: 30 TABLET | Refills: 0
Start: 2020-09-18 | End: 2020-10-18

## 2020-08-14 ASSESSMENT — ENCOUNTER SYMPTOMS
CONSTIPATION: 0
SHORTNESS OF BREATH: 0
NAUSEA: 1
VOMITING: 0
DIARRHEA: 0
ABDOMINAL PAIN: 0
COUGH: 0
WHEEZING: 0
COLOR CHANGE: 0

## 2020-08-14 NOTE — PATIENT INSTRUCTIONS
SURVEY:    You may be receiving a survey from [employer] regarding your visit today. Please complete the survey to enable us to provide the highest quality of care to you and your family. If you cannot score us a very good on any question, please call the office to discuss how we could have made your experience a very good one. Thank you. Patient Education        Counting Carbohydrates: Care Instructions  Your Care Instructions     You don't have to eat special foods when you have diabetes. You just have to be careful to eat healthy foods. Carbohydrates (carbs) raise blood sugar higher and quicker than any other nutrient. Carbs are found in desserts, breads and cereals, and fruit. They're also in starchy vegetables. These include potatoes, corn, and grains such as rice and pasta. Carbs are also in milk and yogurt. The more carbs you eat at one time, the higher your blood sugar will rise. Spreading carbs all through the day helps keep your blood sugar levels within your target range. Counting carbs is one of the best ways to keep your blood sugar under control. If you use insulin, counting carbs helps you match the right amount of insulin to the number of grams of carbs in a meal. Then you can change your diet and insulin dose as needed. Testing your blood sugar several times a day can help you learn how carbs affect your blood sugar. A registered dietitian or certified diabetes educator can help you plan meals and snacks. Follow-up care is a key part of your treatment and safety. Be sure to make and go to all appointments, and call your doctor if you are having problems. It's also a good idea to know your test results and keep a list of the medicines you take. How can you care for yourself at home?   Know your daily amount of carbohydrates  Your daily amount depends on several things, such as your weight, how active you are, which diabetes medicines you take, and what your goals are for your blood sugar levels. A registered dietitian or certified diabetes educator can help you plan how many carbs to include in each meal and snack. For most adults, a guideline for the daily amount of carbs is:  · 45 to 60 grams at each meal. That's about the same as 3 to 4 carbohydrate servings. · 15 to 20 grams at each snack. That's about the same as 1 carbohydrate serving. Count carbs  Counting carbs lets you know how much rapid-acting insulin to take before you eat. If you use an insulin pump, you get a constant rate of insulin during the day. So the pump must be programmed at meals. This gives you extra insulin to cover the rise in blood sugar after meals. If you take insulin:  · Learn your own insulin-to-carb ratio. You and your diabetes health professional will figure out the ratio. You can do this by testing your blood sugar after meals. For example, you may need a certain amount of insulin for every 15 grams of carbs. · Add up the carb grams in a meal. Then you can figure out how many units of insulin to take based on your insulin-to-carb ratio. · Exercise lowers blood sugar. You can use less insulin than you would if you were not doing exercise. Keep in mind that timing matters. If you exercise within 1 hour after a meal, your body may need less insulin for that meal than it would if you exercised 3 hours after the meal. Test your blood sugar to find out how exercise affects your need for insulin. If you do or don't take insulin:  · Look at labels on packaged foods. This can tell you how many carbs are in a serving. You can also use guides from the American Diabetes Association. · Be aware of portions, or serving sizes. If a package has two servings and you eat the whole package, you need to double the number of grams of carbohydrate listed for one serving. · Protein, fat, and fiber do not raise blood sugar as much as carbs do.  If you eat a lot of these nutrients in a meal, your blood sugar will rise more slowly than it would otherwise. Eat from all food groups  · Eat at least three meals a day. · Plan meals to include food from all the food groups. The food groups include grains, fruits, dairy, proteins, and vegetables. · Talk to your dietitian or diabetes educator about ways to add limited amounts of sweets into your meal plan. · If you drink alcohol, talk to your doctor. It may not be recommended when you are taking certain diabetes medicines. Where can you learn more? Go to https://IP [employer].NiteTables. org and sign in to your benchee account. Enter P922 in the Dynasil box to learn more about \"Counting Carbohydrates: Care Instructions. \"     If you do not have an account, please click on the \"Sign Up Now\" link. Current as of: December 20, 2019               Content Version: 12.5  © 4283-4597 [employer]. Care instructions adapted under license by [facility] [address] . If you have questions about a medical condition or this instruction, always ask your healthcare professional. [place] [address] any warranty or liability for your use of this information. Patient Education        Fatigue: Care Instructions  Your Care Instructions     Fatigue is a feeling of tiredness, exhaustion, or lack of energy. You may feel fatigue because of too much or not enough activity. It can also come from stress, lack of sleep, boredom, and poor diet. Many medical problems, such as viral infections, can cause fatigue. Emotional problems, especially depression, are often the cause of fatigue. Fatigue is most often a symptom of another problem. Treatment for fatigue depends on the cause. For example, if you have fatigue because you have a certain health problem, treating this problem also treats your fatigue. If depression or anxiety is the cause, treatment may help. Follow-up care is a key part of your treatment and safety.  Be sure to make and go to all appointments, and call your doctor if you are having problems. It's also a good idea to know your test results and keep a list of the medicines you take. How can you care for yourself at home? · Get regular exercise. But don't overdo it. Go back and forth between rest and exercise. · Get plenty of rest.  · Eat a healthy diet. Do not skip meals, especially breakfast.  · Reduce your use of caffeine, tobacco, and alcohol. Caffeine is most often found in coffee, tea, cola drinks, and chocolate. · Limit medicines that can cause fatigue. This includes tranquilizers and cold and allergy medicines. When should you call for help? Watch closely for changes in your health, and be sure to contact your doctor if:  · You have new symptoms such as fever or a rash. · Your fatigue gets worse. · You have been feeling down, depressed, or hopeless. Or you may have lost interest in things that you usually enjoy. · You are not getting better as expected. Where can you learn more? Go to https://Novan.[employer]. org and sign in to your Azima account. Enter O175 in the [employer] box to learn more about \"Fatigue: Care Instructions. \"     If you do not have an account, please click on the \"Sign Up Now\" link. Current as of: June 26, 2019               Content Version: 12.5  © 9107-5491 Healthwise, Incorporated. Care instructions adapted under license by [facility] ([facility]). If you have questions about a medical condition or this instruction, always ask your healthcare professional. [place] [address] any warranty or liability for your use of this information.

## 2020-08-14 NOTE — PROGRESS NOTES
[address] [facility] WALK-IN CARE  [address] [facility] [address] [facility]  Dept: [phone]  Dept Fax: [phone]    Chela Mcdaniel is a 63 y.o. female who present[facility] the [facility] Care today for her medical conditions/complaints as noted below. Chela Mcdaniel is c/o of Hematoma (left hand and arm); Nausea & Vomiting (started last night); and Fatigue (not any better)      HPI:     Alhaji Salmon is here today for a walk-in visit. She was recently in the emergency department and developed a hematoma of her left antecubital following IV insertion and removal.  She reports improvement of swelling and tenderness but it is still somewhat present. Last night she thought she had another hematoma developing on her left hand. She denies any bruising to the area but it was a little sore. She denies any trauma to the area. Along with these complaints she also again feels nauseous and reports heartburn and \"nasty burps\". She feels like this started back up when she started to take her metformin this week. She denies any abdominal pain or vomiting. She also feels fatigued and has some dizziness with getting up. She denies any syncope or falls. She has a history of uncontrolled hypertension and recently started to take medication again to help control this. She is currently on lisinopril 20 mg daily, verapamil 20 mg daily and labetalol 100 mg twice daily. Today in the office her blood pressure is 109/71.        Past Medical History:   Diagnosis Date    Diabetes mellitus ([facility] [address]     Hepatitis A     Hyperlipidemia     Hypertension         Current Outpatient Medications   Medication Sig Dispense Refill    [START ON 9/14/2020] Semaglutide (RYBELSUS) 3 MG TABS Take 1 tablet by mouth daily 30 tablet 0    [START ON 9/14/2020] Semaglutide 7 MG TABS Take 1 tablet by mouth daily 30 tablet 0    [START ON 9/18/2020] Semaglutide (RYBELSUS) 14 MG TABS Take 1 tablet by mouth daily 30 tablet 0    verapamil (CALAN SR) 120 MG extended release tablet Take 1 tablet by mouth daily 90 tablet 0    labetalol (NORMODYNE) 100 MG tablet Take 1 tablet by mouth 2 times daily 60 tablet 1    SITagliptin (JANUVIA) 100 MG tablet Take 1 tablet by mouth daily 90 tablet 1    ezetimibe (ZETIA) 10 MG tablet Take 1 tablet by mouth daily 90 tablet 1    lisinopril (PRINIVIL;ZESTRIL) 10 MG tablet Take 1 tablet by mouth daily (Patient taking differently: Take 10 mg by mouth daily Takes 2 daily) 90 tablet 1    clotrimazole-betamethasone (LOTRISONE) 1-0.05 % cream Apply topically 2 times daily. 1 Tube 1    Na Sulfate-K Sulfate-Mg Sulf (SUPREP BOWEL PREP KIT) 17.5-3.13-1.6 GM/177ML SOLN Take as directed (Patient not taking: Reported on 8/10/2020) 2 Bottle 0     No current facility-administered medications for this visit. Allergies   Allergen Reactions    Statins Swelling       Subjective:      Review of Systems   Constitutional: Positive for appetite change and fatigue. Negative for activity change and fever. Respiratory: Negative for cough, shortness of breath and wheezing. Gastrointestinal: Positive for nausea. Negative for abdominal pain, constipation, diarrhea and vomiting. Bloating   Genitourinary: Negative for difficulty urinating. Musculoskeletal: Positive for arthralgias. Skin: Negative for color change, rash and wound. Neurological: Positive for dizziness. Negative for syncope and headaches. Objective:     Physical Exam  Vitals signs and nursing note reviewed. Constitutional:       General: She is not in acute distress. Appearance: Normal appearance. She is not toxic-appearing or diaphoretic. HENT:      Head: Normocephalic and atraumatic. Eyes:      General:         Right eye: No discharge. Left eye: No discharge. Conjunctiva/sclera: Conjunctivae normal.   Neck:      Musculoskeletal: Normal range of motion and neck supple.    Cardiovascular:      Rate and Rhythm: Normal rate and regular rhythm. Heart sounds: No murmur. Pulmonary:      Effort: Pulmonary effort is normal.      Breath sounds: Normal breath sounds. No wheezing, rhonchi or rales. Abdominal:      General: There is no distension. Tenderness: There is no abdominal tenderness. Musculoskeletal:         General: Tenderness present. No swelling. Arms:       Left hand: She exhibits tenderness. She exhibits normal range of motion, normal capillary refill, no deformity, no laceration and no swelling. Hands:    Skin:     Findings: No bruising, ecchymosis, erythema or signs of injury. Neurological:      General: No focal deficit present. Mental Status: She is alert and oriented to person, place, and time. Psychiatric:         Mood and Affect: Mood normal.         Behavior: Behavior normal.       BP (!) 94/56 (Site: Right Upper Arm, Position: Standing)   Pulse 63   Temp 97.8 °F (36.6 °C) (Temporal)   Resp 18   Wt 205 lb 4.8 oz (93.1 kg)   SpO2 99%   BMI 37.55 kg/m²     Assessment:      Diagnosis Orders   1. Essential hypertension     2. Hematoma of arm, left, sequela     3. Medication side effect       Blood pressure significantly improved I do believe that this is the cause of her dizziness. 10 point drop with orthostatic blood pressures. We will decrease lisinopril from 20 mg to 10 mg.  I did explain to her that it will take time for her to adjust to the lower blood pressure and to be careful with getting up too quickly. Hematoma of IV site- improved. Advised her to continue supportive care including icing for 20 minutes 2-3 times daily. There is no hematoma of her left hand. There is no palpable abnormality. She denies any trauma to the area. We will keep an eye on this and if it continues to bother her we can obtain an x-ray. Medication side effect-have her stop metformin and will restart semaglutide.   She is to contact us if she has any problems with the semaglutide. Plan:         Discussed exam, POCT findings, plan of care (including prescriptive and supportive as listed below) and follow-up atlength with patient. Reviewed all prescribed and recommended medications, administration and side effects. Encouraged to return to [address] [place] [address] for noimprovement and or worsening of symptoms. To ER or call 911 if any difficulty breathing, shortness of breath, inability to swallow, hives or temp greater than 103 degrees. Questions answered. They verbalized understandingand agreement. Return if symptoms worsen or fail to improve. Orders Placed This Encounter   Medications    Semaglutide (RYBELSUS) 3 MG TABS     Sig: Take 1 tablet by mouth daily     Dispense:  30 tablet     Refill:  0    Semaglutide 7 MG TABS     Sig: Take 1 tablet by mouth daily     Dispense:  30 tablet     Refill:  0    Semaglutide (RYBELSUS) 14 MG TABS     Sig: Take 1 tablet by mouth daily     Dispense:  30 tablet     Refill:  0          All patient questions answered. Pt voiced understanding.       Electronically signed by GIOVANI Gaxiola CNP on 8/14/2020 at 2:28 PM

## 2020-08-14 NOTE — TELEPHONE ENCOUNTER
Clement Tubbs
I would recommend she come to walk-in for evaluation of the hematoma. I would also recommend that she stop the metformin and restart the Rybelsus if possible.
Occurrence   JOSE MIGUEL DIGITAL SCREEN W OR WO CAD BILATERAL Once 08/31/2020   Lipid Panel Once 10/19/2020   Hepatic Function Panel Once 10/19/2020   COLONOSCOPY W/ OR W/O BIOPSY Once 07/31/2020       Next Visit Date:  Future Appointments   Date Time Provider Abdullahi Meaghan   8/19/2020  4:10 PM Nicole Marr MD [facility]   8/24/2020  4:40 PM Roseann Dumont APRN - CNP [facility] TPP   8/31/2020  3:30 PM [facility] MAMMOGRAPHY ROOM AT [facility] Rad   10/22/2020  3:00 PM GIOVANI Peters CNP [facility] TPP            Patient Active Problem List:     Gastroenteritis, acute     Diabetes mellitus type 2 with complications, uncontrolled ([address]     HTN (hypertension)     Dyslipidemia     Essential hypertension     Uncontrolled type 2 diabetes mellitus with hyperglycemia ([facility] [address]     Positive FIT (fecal immunochemical test)     Acute nonintractable headache

## 2020-08-18 NOTE — TELEPHONE ENCOUNTER
Please call the patient and let her know that outpatient is trying to get a hold of her. If she does not want the colonoscopy cancel it.

## 2020-08-19 ENCOUNTER — HOSPITAL ENCOUNTER (OUTPATIENT)
Age: 60
Setting detail: SPECIMEN
Discharge: HOME | End: 2020-08-19
Payer: COMMERCIAL

## 2020-08-19 ENCOUNTER — OFFICE VISIT (OUTPATIENT)
Dept: OBGYN | Age: 60
End: 2020-08-19
Payer: COMMERCIAL

## 2020-08-19 VITALS
HEIGHT: 62 IN | SYSTOLIC BLOOD PRESSURE: 138 MMHG | DIASTOLIC BLOOD PRESSURE: 62 MMHG | WEIGHT: 208 LBS | BODY MASS INDEX: 38.28 KG/M2

## 2020-08-19 PROCEDURE — G0145 SCR C/V CYTO,THINLAYER,RESCR: HCPCS

## 2020-08-19 PROCEDURE — 99396 PREV VISIT EST AGE 40-64: CPT | Performed by: OBSTETRICS & GYNECOLOGY

## 2020-08-19 RX ORDER — NYSTATIN 100000 [USP'U]/G
POWDER TOPICAL
Qty: 1 BOTTLE | Refills: 5 | Status: SHIPPED | OUTPATIENT
Start: 2020-08-19 | End: 2020-09-29

## 2020-08-19 NOTE — PROGRESS NOTES
YEARLY PHYSICAL    Date of service: 2020    Watson Maier  Is a 63 y.o.   female    PT's PCP is: J Luis Dumont, APRN - CNP     : 1960                                             Subjective:       No LMP recorded. Patient has had a hysterectomy. Are your menses regular: not applicable    OB History   No obstetric history on file. Social History     Tobacco Use   Smoking Status Never Smoker   Smokeless Tobacco Never Used        Social History     Substance and Sexual Activity   Alcohol Use No       Family History   Problem Relation Age of Onset    Diabetes Father        Allergies: Statins      Current Outpatient Medications:     nystatin (MYCOSTATIN) 882278 UNIT/GM powder, Apply 3 times daily as needed for recurrent skin infections, Disp: 1 Bottle, Rfl: 5    [START ON 2020] Semaglutide (RYBELSUS) 3 MG TABS, Take 1 tablet by mouth daily, Disp: 30 tablet, Rfl: 0    [START ON 2020] Semaglutide 7 MG TABS, Take 1 tablet by mouth daily, Disp: 30 tablet, Rfl: 0    [START ON 2020] Semaglutide (RYBELSUS) 14 MG TABS, Take 1 tablet by mouth daily, Disp: 30 tablet, Rfl: 0    verapamil (CALAN SR) 120 MG extended release tablet, Take 1 tablet by mouth daily, Disp: 90 tablet, Rfl: 0    labetalol (NORMODYNE) 100 MG tablet, Take 1 tablet by mouth 2 times daily, Disp: 60 tablet, Rfl: 1    SITagliptin (JANUVIA) 100 MG tablet, Take 1 tablet by mouth daily, Disp: 90 tablet, Rfl: 1    ezetimibe (ZETIA) 10 MG tablet, Take 1 tablet by mouth daily, Disp: 90 tablet, Rfl: 1    lisinopril (PRINIVIL;ZESTRIL) 10 MG tablet, Take 1 tablet by mouth daily (Patient taking differently: Take 10 mg by mouth daily Takes 2 daily), Disp: 90 tablet, Rfl: 1    clotrimazole-betamethasone (LOTRISONE) 1-0.05 % cream, Apply topically 2 times daily.  (Patient not taking: Reported on 2020), Disp: 1 Tube, Rfl: 1    Social History Substance and Sexual Activity   Sexual Activity Not on file       Any bleeding or pain with intercourse: No    Last Yearly:  unknown    Last pap: unknown    Last HPV: unknown    Last Mammogram: scheduled for 8/31    Last Dexascan never    Do you do self breast exams: No    Past Medical History:   Diagnosis Date    Diabetes mellitus ([facility] [address]     Hepatitis A     Hyperlipidemia     Hypertension        Past Surgical History:   Procedure Laterality Date    APPENDECTOMY      CHOLECYSTECTOMY      HYSTERECTOMY      TUBAL LIGATION         Family History   Problem Relation Age of Onset    Diabetes Father        Any family history of breast or ovarian cancer: No    Any family history of blood clots: No      Chief Complaint   Patient presents with    Gynecologic Exam          Nurse: FREDDY    PE:  Vital Signs  Blood pressure 138/62, height 5' 2\" (1.575 m), weight 208 lb (94.3 kg). Labs:    No results found for this visit on 08/19/20. HPI: The patient is here for a yearly exam and to follow-up on her large gluteal abscess. The patient is seeing her medical provider to get her blood pressure and blood sugars under better control    NoPT denies fever, chills, nausea and vomiting       Objective  Lymphatic:   no lymphadenopathy  Heent:   negative   Cor: regular rate and rhythm, no murmurs              Pul:clear to auscultation bilaterally- no wheezes, rales or rhonchi, normal air movement, no respiratory distress      GI: Abdomen soft, non-tender.  BS normal. No masses,  No organomegaly, obesity noted, significant intertrigo in the abdominal fold at the pubis           Extremities: normal strength, tone, and muscle mass   Breasts: Breast:normal appearance, no masses or tenderness, Inspection negative, No nipple retraction or dimpling, No nipple discharge or bleeding, No axillary or supraclavicular adenopathy, Normal to palpation without dominant masses   Pelvic Exam: GENITAL/URINARY:  External Genitalia:  General appearance; normal, Hair distribution; normal, Lesions absent  Vagina:  General appearance normal, Discharge absent, Lesions absent, Pelvic support normal  Uterus:  Hystory of hysterectomy  Adenexa: Masses absent, Tenderness absent, Enlargement absent, Nodularity absent                                    Vaginal discharge: no vaginal discharge     Gluteal: The area of the abscess drainage tract is almost completely healed at this point. There is near complete resolution of the erythema and edema. She was also counseled on her preventative health maintenance recommendations and follow-up. Assessment and Plan: Will treat the intertrigo. Routine follow-up now regarding the large abscess        Diagnosis Orders   1. Encounter for well woman exam with routine gynecological exam  PAP SMEAR   2. Intertrigo  nystatin (MYCOSTATIN) [id number] UNIT/GM powder             I have discontinued Wendy Garner's Suprep Bowel Prep Kit. I am also having her start on nystatin. Additionally, I am having her maintain her clotrimazole-betamethasone, lisinopril, SITagliptin, ezetimibe, labetalol, verapamil, Rybelsus, Semaglutide, and Rybelsus. No follow-ups on file. There are no Patient Instructions on file for this visit.        Vanessa Singh 4:50 PM

## 2020-08-24 ENCOUNTER — NURSE ONLY (OUTPATIENT)
Dept: PRIMARY CARE CLINIC | Age: 60
End: 2020-08-24

## 2020-08-24 VITALS
BODY MASS INDEX: 38.26 KG/M2 | RESPIRATION RATE: 22 BRPM | WEIGHT: 209.2 LBS | HEART RATE: 66 BPM | TEMPERATURE: 97.5 F | SYSTOLIC BLOOD PRESSURE: 136 MMHG | DIASTOLIC BLOOD PRESSURE: 68 MMHG

## 2020-08-24 NOTE — PATIENT INSTRUCTIONS
SURVEY:    You may be receiving a survey from [employer] regarding your visit today. Please complete the survey to enable us to provide the highest quality of care to you and your family. If you cannot score us a very good on any question, please call the office to discuss how we could have made your experience a very good one. Thank you.

## 2020-08-26 LAB — CYTOLOGY REPORT: NORMAL

## 2020-08-31 ENCOUNTER — TELEPHONE (OUTPATIENT)
Dept: PRIMARY CARE CLINIC | Age: 60
End: 2020-08-31

## 2020-08-31 ENCOUNTER — HOSPITAL ENCOUNTER (OUTPATIENT)
Dept: WOMENS IMAGING | Age: 60
Discharge: HOME | End: 2020-09-02
Payer: COMMERCIAL

## 2020-08-31 PROCEDURE — 77067 SCR MAMMO BI INCL CAD: CPT

## 2020-08-31 NOTE — TELEPHONE ENCOUNTER
----- Message from [address] [place] [address], GIOVANI - CNP sent at 8/31/2020  4:05 PM EDT -----  Results are normal, please call patient and make them aware.

## 2020-09-01 ENCOUNTER — OFFICE VISIT (OUTPATIENT)
Dept: PRIMARY CARE CLINIC | Age: 60
End: 2020-09-01
Payer: COMMERCIAL

## 2020-09-01 VITALS
HEART RATE: 66 BPM | SYSTOLIC BLOOD PRESSURE: 168 MMHG | OXYGEN SATURATION: 98 % | HEIGHT: 62 IN | RESPIRATION RATE: 18 BRPM | DIASTOLIC BLOOD PRESSURE: 82 MMHG | BODY MASS INDEX: 38.5 KG/M2 | WEIGHT: 209.2 LBS | TEMPERATURE: 96.9 F

## 2020-09-01 PROCEDURE — 99214 OFFICE O/P EST MOD 30 MIN: CPT | Performed by: NURSE PRACTITIONER

## 2020-09-01 RX ORDER — ERYTHROMYCIN 5 MG/G
OINTMENT OPHTHALMIC
Qty: 1 TUBE | Refills: 0 | Status: SHIPPED | OUTPATIENT
Start: 2020-09-01 | End: 2020-09-11

## 2020-09-01 ASSESSMENT — ENCOUNTER SYMPTOMS
SHORTNESS OF BREATH: 0
COUGH: 0
EYE ITCHING: 0
VOMITING: 0
EYE DISCHARGE: 0
COLOR CHANGE: 0
WHEEZING: 0
NAUSEA: 0

## 2020-09-01 NOTE — PROGRESS NOTES
Name: Binta Haque  : 1960         Chief Complaint:     Chief Complaint   Patient presents with    Arm Pain     Left arm  painX1 month       History of Present Illness: Binta Haque is a 63 y.o.  female who presents with Arm Pain (Left arm  painX1 month)      Quyen Perla is here today for a routine office visit. She reports she still have a mass in her left after that hurts sometimes with movement. She noticed the area following IV insertion roughly one month ago. At the time it was believed to be a hematoma however it has not gone away. She tried warm compresses with no improvement. She denies any erythema or bruising to the area. She has also noticed a small lump near her left wrist that has gotten bigger. This area is also somewhat painful when she is moving her wrist.  When she was in the shower the other day she noticed a bump on the back of her left calf. This area is not painful. She denies any erythema or drainage in the area. She does have a history of morbid obesity and lipomas. Stye- for roughly the last week she has had a painful bump on her upper left lid. She denies any changes in vision. She had leftover antibiotic drops that she tried for 1 day with no improvement. Eye Problem    The left eye is affected. This is a new problem. The current episode started in the past 7 days. The problem has been unchanged. There was no injury mechanism. The pain is mild. There is no known exposure to pink eye. She does not wear contacts. Pertinent negatives include no eye discharge, eye redness, fever, foreign body sensation, itching, nausea, recent URI or vomiting. She has tried eye drops for the symptoms. The treatment provided no relief.          Past Medical History:     Past Medical History:   Diagnosis Date    Diabetes mellitus ([facility] [address]     Hepatitis A     Hyperlipidemia     Hypertension       Reviewed all health maintenance requirements and ordered appropriate tests  Health Problem Relation Age of Onset    Diabetes Father        Review of Systems:     Positive and Negative as described in HPI    Review of Systems   Constitutional: Negative for activity change and fever. Eyes: Negative for discharge, redness and itching. See HPI   Respiratory: Negative for cough, shortness of breath and wheezing. Gastrointestinal: Negative for nausea and vomiting. Musculoskeletal:        See HPI   Skin: Negative for color change and wound. Neurological: Negative for dizziness. Physical Exam:   Vitals:  BP (!) 168/82 (Site: Right Upper Arm, Position: Sitting, Cuff Size: Large Adult) Comment: manual  Pulse 66   Temp 96.9 °F (36.1 °C) (Temporal)   Resp 18   Ht 5' 2\" (1.575 m)   Wt 209 lb 3.2 oz (94.9 kg)   SpO2 98%   BMI 38.26 kg/m²     Physical Exam  Vitals signs and nursing note reviewed. Constitutional:       General: She is not in acute distress. Appearance: Normal appearance. She is obese. She is not ill-appearing, toxic-appearing or diaphoretic. HENT:      Head: Normocephalic and atraumatic. Eyes:      General: Lids are everted, no foreign bodies appreciated. No allergic shiner. Right eye: No foreign body, discharge or hordeolum. Left eye: Hordeolum present. No foreign body or discharge. Conjunctiva/sclera:      Right eye: No chemosis, exudate or hemorrhage. Left eye: No chemosis, exudate or hemorrhage. Neck:      Musculoskeletal: Normal range of motion and neck supple. Cardiovascular:      Rate and Rhythm: Normal rate and regular rhythm. Heart sounds: No murmur. Pulmonary:      Effort: Pulmonary effort is normal.      Breath sounds: Normal breath sounds. No wheezing, rhonchi or rales. Musculoskeletal:        Arms:         Hands:       Right lower leg: No edema. Left lower leg: No edema. Legs:    Skin:     General: Skin is warm. Findings: No erythema or rash.    Neurological:      General: No focal deficit present. Mental Status: She is alert and oriented to person, place, and time. Psychiatric:         Mood and Affect: Mood normal.         Behavior: Behavior normal.         Data:     Lab Results   Component Value Date     08/03/2020    K 4.2 08/03/2020     08/03/2020    CO2 25 08/03/2020    BUN 17 08/03/2020    CREATININE 0.82 08/03/2020    GLUCOSE 243 08/03/2020    PROT 5.8 01/09/2015    LABALBU 4.3 01/27/2015    BILITOT 0.40 01/09/2015    ALKPHOS 57 01/09/2015    AST 31 07/31/2020    ALT 60 07/31/2020     Lab Results   Component Value Date    WBC 7.6 08/03/2020    RBC 5.30 08/03/2020    HGB 14.6 08/03/2020    HCT 46.8 08/03/2020    MCV 88.3 08/03/2020    MCH 27.5 08/03/2020    MCHC 31.2 08/03/2020    RDW 13.2 08/03/2020     08/03/2020    MPV 9.9 08/03/2020     Lab Results   Component Value Date    TSH 1.78 07/18/2016     Lab Results   Component Value Date    CHOL 305 07/31/2020    CHOL 271 11/10/2017    HDL 44 07/31/2020    LABA1C 14.0 07/22/2020       Assessment/Plan:      Diagnosis Orders   1. Mass of left upper extremity  US EXTREMITY LEFT NON VASC LIMITED   2. Hordeolum internum of left upper eyelid  erythromycin (ROMYCIN) 5 MG/GM ophthalmic ointment   3. Multiple lipomas       I believe her lumps/masses are likely lipomas however the area in her left upper arm feels different than other areas. Will obtain a ultrasound for further assessment. Discussed referral to general surgery/ortho if mass on near left wrist continues to bother her but given her uncontrolled type 2 diabetes I am not sure they would do the procedure. Blood pressure is elevated in the office however it was normal on 8/19 and 8/24. We will make no adjustment to BP medications at this time. She will come back for a blood pressure check in 2 weeks. 1.  Wendy received counseling on the following healthy behaviors: nutrition, exercise and medication adherence  2.   Patient given educational materials - see patient

## 2020-09-01 NOTE — PATIENT INSTRUCTIONS
SURVEY:    You may be receiving a survey from [employer] regarding your visit today. Please complete the survey to enable us to provide the highest quality of care to you and your family. If you cannot score us a very good on any question, please call the office to discuss how we could have made your experience a very good one. Thank you. Patient Education        Lipoma: Care Instructions  Your Care Instructions  A lipoma is a growth of fat just below the skin. It may feel soft and rubbery. Lipomas can occur anywhere on the body. But they are most common on the torso, neck, upper thighs, upper arms, and armpits. A lipoma does not turn into cancer. Lipomas usually are not treated, because most of them don't hurt or cause problems. But your doctor may remove a lipoma if it is painful, gets infected, or bothers you. Follow-up care is a key part of your treatment and safety. Be sure to make and go to all appointments, and call your doctor if you are having problems. It's also a good idea to know your test results and keep a list of the medicines you take. How can you care for yourself at home? · A lipoma usually needs no care at home unless your doctor made a cut (incision) to remove it. · If your doctor told you how to care for your incision, follow your doctor's instructions. If you did not get instructions, follow this general advice:  ? Wash around the incision with clean water 2 times a day. Don't use hydrogen peroxide or alcohol. These can slow healing. ? You may cover the incision with a thin layer of petroleum jelly, such as Vaseline, and a nonstick bandage. ? Apply more petroleum jelly and replace the bandage as needed. When should you call for help? Call your doctor now or seek immediate medical care if:  · You have signs of infection, such as:  ? Increased pain, swelling, warmth, or redness. ? Red streaks leading from the lipoma. ? Pus draining from the lipoma. ? A fever.   Watch closely for changes in your health, and be sure to contact your doctor if:  · The lipoma is growing or changing. · You do not get better as expected. Where can you learn more? Go to https://[employer]etameraSnapTelleb.iLink. org and sign in to your [employer] account. Enter D303 in the [facility] box to learn more about \"Lipoma: Care Instructions. \"     If you do not have an account, please click on the \"Sign Up Now\" link. Current as of: October 31, 2019               Content Version: 12.5  © 4537-1843 Healthwise, Incorporated. Care instructions adapted under license by [facility] ([facility]). If you have questions about a medical condition or this instruction, always ask your healthcare professional. [address] any warranty or liability for your use of this information.

## 2020-09-02 ASSESSMENT — ENCOUNTER SYMPTOMS
EYE REDNESS: 0
FOREIGN BODY SENSATION: 0

## 2020-09-08 ENCOUNTER — HOSPITAL ENCOUNTER (OUTPATIENT)
Dept: ULTRASOUND IMAGING | Age: 60
Discharge: HOME | End: 2020-09-10
Payer: COMMERCIAL

## 2020-09-08 ENCOUNTER — TELEPHONE (OUTPATIENT)
Dept: PRIMARY CARE CLINIC | Age: 60
End: 2020-09-08

## 2020-09-08 PROCEDURE — 76882 US LMTD JT/FCL EVL NVASC XTR: CPT

## 2020-09-08 NOTE — TELEPHONE ENCOUNTER
Informed patient of results. Wrist does bother her but is trying to get her sugars under control better.  Will monitor

## 2020-09-15 ENCOUNTER — NURSE ONLY (OUTPATIENT)
Dept: PRIMARY CARE CLINIC | Age: 60
End: 2020-09-15

## 2020-09-15 VITALS
HEIGHT: 62 IN | DIASTOLIC BLOOD PRESSURE: 96 MMHG | TEMPERATURE: 97.1 F | HEART RATE: 62 BPM | SYSTOLIC BLOOD PRESSURE: 198 MMHG | BODY MASS INDEX: 37.97 KG/M2 | RESPIRATION RATE: 20 BRPM | WEIGHT: 206.3 LBS

## 2020-09-15 RX ORDER — LISINOPRIL 20 MG/1
20 TABLET ORAL DAILY
Qty: 90 TABLET | Refills: 1 | Status: SHIPPED | OUTPATIENT
Start: 2020-09-15 | End: 2021-04-13

## 2020-09-15 RX ORDER — ORAL SEMAGLUTIDE 7 MG/1
TABLET ORAL
COMMUNITY
End: 2020-09-29 | Stop reason: SDUPTHER

## 2020-09-15 ASSESSMENT — ENCOUNTER SYMPTOMS
WHEEZING: 0
COLOR CHANGE: 0
COUGH: 0
NAUSEA: 0
ORTHOPNEA: 0
SHORTNESS OF BREATH: 0
BLURRED VISION: 0
VOMITING: 0
FOREIGN BODY SENSATION: 0

## 2020-09-15 NOTE — PROGRESS NOTES
Name: Brianna Bennett  : 1960         Chief Complaint:     Chief Complaint   Patient presents with    Hypertension     b/p check       History of Present Illness: Brianna Bennett is a 63 y.o.  female who presents with Hypertension (b/p check)      Nadiya Akers is here today for a routine office visit. She continues to have pain near her left elbow and left wrist where there are palpable masses. An ultrasound was obtained for the mass near her left elbow/antecubital and did show a 2.8 cm mass favoring a lipoma. She is hoping to see someone to have them removed. Hypertension   This is a chronic problem. The current episode started more than 1 year ago. The problem has been gradually worsening since onset. The problem is uncontrolled. Pertinent negatives include no anxiety, blurred vision, chest pain, malaise/fatigue, neck pain, orthopnea, palpitations, peripheral edema, PND, shortness of breath or sweats. There are no associated agents to hypertension. Risk factors for coronary artery disease include stress, obesity, post-menopausal state, diabetes mellitus, dyslipidemia and family history. Past treatments include ACE inhibitors, beta blockers and calcium channel blockers. The current treatment provides mild improvement. Compliance problems include exercise and diet. There is no history of kidney disease, CAD/MI, CVA or heart failure. There is no history of chronic renal disease. Past Medical History:     Past Medical History:   Diagnosis Date    Diabetes mellitus ([address]     Hepatitis A     Hyperlipidemia     Hypertension       Reviewed all health maintenance requirements and ordered appropriate tests  There are no preventive care reminders to display for this patient.     Past Surgical History:     Past Surgical History:   Procedure Laterality Date    APPENDECTOMY      CHOLECYSTECTOMY      HYSTERECTOMY      TUBAL LIGATION          Medications:       Prior to Admission medications Medication Sig Start Date End Date Taking? Authorizing Provider   Semaglutide (RYBELSUS) 7 MG TABS Take by mouth   Yes Historical ProviderMD Llanoside 7 MG TABS Take 1 tablet by mouth daily 9/14/20 10/14/20 Yes GIOVANI Painter CNP   verapamil (CALAN SR) 120 MG extended release tablet Take 1 tablet by mouth daily 8/10/20  Yes GIOVANI Houston CNP   labetalol (NORMODYNE) 100 MG tablet Take 1 tablet by mouth 2 times daily 8/3/20  Yes Pedro Orosco MD   ezetimibe (ZETIA) 10 MG tablet Take 1 tablet by mouth daily 7/31/20  Yes GIOVANI Painter CNP   lisinopril (PRINIVIL;ZESTRIL) 20 MG tablet Take 1 tablet by mouth daily 9/15/20   GIOVANI Cardoza CNP   nystatin (MYCOSTATIN) 242621 UNIT/GM powder Apply 3 times daily as needed for recurrent skin infections  Patient not taking: Reported on 9/15/2020 8/19/20   Leidy Lion MD   Semaglutide (RYBELSUS) 14 MG TABS Take 1 tablet by mouth daily  Patient not taking: Reported on 8/24/2020 9/18/20 10/18/20  GIOVANI Cardoza CNP        Allergies:       Statins    Social History:     Tobacco:    reports that she has never smoked. She has never used smokeless tobacco.  Alcohol:      reports no history of alcohol use. Drug Use:  reports no history of drug use. Family History:     Family History   Problem Relation Age of Onset    Diabetes Father        Review of Systems:     Positive and Negative as described in HPI    Review of Systems   Constitutional: Negative for activity change, fever and malaise/fatigue. Eyes: Negative for blurred vision. Respiratory: Negative for cough, shortness of breath and wheezing. Cardiovascular: Negative for chest pain, palpitations, orthopnea and PND. Gastrointestinal: Negative for nausea and vomiting. Musculoskeletal: Positive for arthralgias. Negative for neck pain. See HPI   Skin: Negative for color change and wound. Neurological: Negative for dizziness and light-headedness. 11/10/2017    HDL 44 07/31/2020    LABA1C 14.0 07/22/2020       Assessment/Plan:      Diagnosis Orders   1. Essential hypertension     2. Lipoma of left upper extremity  [facility], DO Nellie, General Surgery, T[place]     Will increase lisinopril from 10 mg to 20 mg. She was well controlled roughly 1 month ago however she was having some dizziness so this was decreased at that time. Continue labetalol 100 mg twice daily and verapamil 120 mg daily. Lipoma of left upper extremity-she reports that she has 2 painful lipomas and would like them removed. Will refer her for evaluation but did discuss that due to her uncontrolled diabetes this will likely need to be put on hold until we can control her blood sugars. 1.  Wendy received counseling on the following healthy behaviors: nutrition, exercise and medication adherence  2. Patient given educational materials - see patient instructions  3. Was a self-tracking handout given in paper form or via [employer]? No  If yes, see orders or list here. 4.  Discussed use, benefit, and side effects of prescribed medications. Barriers to medication compliance addressed. All patient questions answered. Pt voiced understanding. 5.  Reviewed prior labs and health maintenance  6. Continue current medications, diet and exercise. Completed Refills   Requested Prescriptions      No prescriptions requested or ordered in this encounter       Return for 2 weeks for blood pressure recheck.

## 2020-09-15 NOTE — PATIENT INSTRUCTIONS
SURVEY:    You may be receiving a survey from [employer] regarding your visit today. Please complete the survey to enable us to provide the highest quality of care to you and your family. If you cannot score us a very good on any question, please call the office to discuss how we could have made your experience a very good one. Thank you. Patient Education        High Blood Pressure: Care Instructions  Overview     It's normal for blood pressure to go up and down throughout the day. But if it stays up, you have high blood pressure. Another name for high blood pressure is hypertension. Despite what a lot of people think, high blood pressure usually doesn't cause headaches or make you feel dizzy or lightheaded. It usually has no symptoms. But it does increase your risk of stroke, heart attack, and other problems. You and your doctor will talk about your risks of these problems based on your blood pressure. Your doctor will give you a goal for your blood pressure. Your goal will be based on your health and your age. Lifestyle changes, such as eating healthy and being active, are always important to help lower blood pressure. You might also take medicine to reach your blood pressure goal.  Follow-up care is a key part of your treatment and safety. Be sure to make and go to all appointments, and call your doctor if you are having problems. It's also a good idea to know your test results and keep a list of the medicines you take. How can you care for yourself at home? Medical treatment  · If you stop taking your medicine, your blood pressure will go back up. You may take one or more types of medicine to lower your blood pressure. Be safe with medicines. Take your medicine exactly as prescribed. Call your doctor if you think you are having a problem with your medicine. · Talk to your doctor before you start taking aspirin every day.  Aspirin can help certain people lower their risk of a heart attack or stroke. But taking aspirin isn't right for everyone, because it can cause serious bleeding. · See your doctor regularly. You may need to see the doctor more often at first or until your blood pressure comes down. · If you are taking blood pressure medicine, talk to your doctor before you take decongestants or anti-inflammatory medicine, such as ibuprofen. Some of these medicines can raise blood pressure. · Learn how to check your blood pressure at home. Lifestyle changes  · Stay at a healthy weight. This is especially important if you put on weight around the waist. Losing even 10 pounds can help you lower your blood pressure. · If your doctor recommends it, get more exercise. Walking is a good choice. Bit by bit, increase the amount you walk every day. Try for at least 30 minutes on most days of the week. You also may want to swim, bike, or do other activities. · Avoid or limit alcohol. Talk to your doctor about whether you can drink any alcohol. · Try to limit how much sodium you eat to less than 2,300 milligrams (mg) a day. Your doctor may ask you to try to eat less than 1,500 mg a day. · Eat plenty of fruits (such as bananas and oranges), vegetables, legumes, whole grains, and low-fat dairy products. · Lower the amount of saturated fat in your diet. Saturated fat is found in animal products such as milk, cheese, and meat. Limiting these foods may help you lose weight and also lower your risk for heart disease. · Do not smoke. Smoking increases your risk for heart attack and stroke. If you need help quitting, talk to your doctor about stop-smoking programs and medicines. These can increase your chances of quitting for good. When should you call for help? Call  911 anytime you think you may need emergency care. This may mean having symptoms that suggest that your blood pressure is causing a serious heart or blood vessel problem. Your blood pressure may be over 180/120.   For example, call 911 if:  · You have symptoms of a heart attack. These may include:  ? Chest pain or pressure, or a strange feeling in the chest.  ? Sweating. ? Shortness of breath. ? Nausea or vomiting. ? Pain, pressure, or a strange feeling in the back, neck, jaw, or upper belly or in one or both shoulders or arms. ? Lightheadedness or sudden weakness. ? A fast or irregular heartbeat. · You have symptoms of a stroke. These may include:  ? Sudden numbness, tingling, weakness, or loss of movement in your face, arm, or leg, especially on only one side of your body. ? Sudden vision changes. ? Sudden trouble speaking. ? Sudden confusion or trouble understanding simple statements. ? Sudden problems with walking or balance. ? A sudden, severe headache that is different from past headaches. · You have severe back or belly pain. Do not wait until your blood pressure comes down on its own. Get help right away. Call your doctor now or seek immediate care if:  · Your blood pressure is much higher than normal (such as 180/120 or higher), but you don't have symptoms. · You think high blood pressure is causing symptoms, such as:  ? Severe headache.  ? Blurry vision. Watch closely for changes in your health, and be sure to contact your doctor if:  · Your blood pressure measures higher than your doctor recommends at least 2 times. That means the top number is higher or the bottom number is higher, or both. · You think you may be having side effects from your blood pressure medicine. Where can you learn more? Go to https://[employer] MediaeddieBoatSetter.Knowmia. org and sign in to your [employer] account. Enter G469 in the [employer] box to learn more about \"High Blood Pressure: Care Instructions. \"     If you do not have an account, please click on the \"Sign Up Now\" link. Current as of: December 16, 2019               Content Version: 12.5  © 9026-6724 Healthwise, Incorporated.    Care instructions adapted under license by [facility]

## 2020-09-17 ENCOUNTER — OFFICE VISIT (OUTPATIENT)
Dept: SURGERY | Age: 60
End: 2020-09-17
Payer: COMMERCIAL

## 2020-09-17 VITALS
SYSTOLIC BLOOD PRESSURE: 145 MMHG | HEART RATE: 53 BPM | RESPIRATION RATE: 22 BRPM | TEMPERATURE: 97.5 F | DIASTOLIC BLOOD PRESSURE: 78 MMHG | WEIGHT: 207 LBS | BODY MASS INDEX: 38.09 KG/M2 | HEIGHT: 62 IN

## 2020-09-17 PROCEDURE — 99203 OFFICE O/P NEW LOW 30 MIN: CPT | Performed by: SURGERY

## 2020-09-17 NOTE — TELEPHONE ENCOUNTER
Left message notifying patient that procedure is cancelled due to Dr. Domitila Murphy retiring. Requested patient contact our office to let us know whether she would like us to send her information to general surgery or follow up with PCP.

## 2020-09-17 NOTE — PROGRESS NOTES
GENERAL SURGERY CONSULTATION / OFFICE VISIT      Patient's Name/ Date of Birth/ Gender: Klarissa Miller / 1960 (63 y.o.) / female     PCP: GIOVANI Mancuso CNP  Referring: Sascha Ortiz CNP    History of present Illness:  Patient is a pleasant 63 y.o. female  kindly referred by Sascha Ortiz (he called me last week to discuss patient) for symptomatic lipomas as described below. She says she is working on diabetes and blood pressure control issues with PCP. She has various lipomas that some are painful and bother her, others are not bothersome. She is also well overdue for screening colonoscopy. I recommend that actually before the lipomas. She said she had one 15 years ago elsewhere in the office. This may have just been a flexible sigmoidoscopy if in the office and plus it has been over 10 years anyways. Past Medical History:  has a past medical history of Diabetes mellitus ([address] Hepatitis A, Hyperlipidemia, and Hypertension. Past Surgical History:   Past Surgical History:   Procedure Laterality Date    APPENDECTOMY      CHOLECYSTECTOMY      HYSTERECTOMY      TUBAL LIGATION         Social History:  reports that she has never smoked. She has never used smokeless tobacco. She reports that she does not drink alcohol or use drugs. Family History: family history includes Diabetes in her father. Review of Systems:   General: Denies fever, chills, night sweats, weight loss, malaise, fatigue  HEENT: Denies sore throat, sinus problems, allergic rhinosinusitis  Card: Denies chest pain, palpitations, orthopnea/PND. HTN. Pulm: Denies cough, shortness of breath, dyspnea on exertion  GI:  per HPI. : Denies polyuria, dysuria, hematuria  Endo: DM  Heme: neg  Psych: neg  Neuro: Denies h/o CVA, TIA  Skin: Denies rashes, ulcers- see exam, lipomas  Musculoskeletal: no gross motor dysfunction.  OA    Allergies: Statins    Current Meds:  Current Outpatient Medications:     lisinopril (PRINIVIL;ZESTRIL) 20 MG tablet, Take 1 tablet by mouth daily, Disp: 90 tablet, Rfl: 1    Semaglutide 7 MG TABS, Take 1 tablet by mouth daily, Disp: 30 tablet, Rfl: 0    verapamil (CALAN SR) 120 MG extended release tablet, Take 1 tablet by mouth daily, Disp: 90 tablet, Rfl: 0    labetalol (NORMODYNE) 100 MG tablet, Take 1 tablet by mouth 2 times daily, Disp: 60 tablet, Rfl: 1    ezetimibe (ZETIA) 10 MG tablet, Take 1 tablet by mouth daily, Disp: 90 tablet, Rfl: 1    Semaglutide (RYBELSUS) 7 MG TABS, Take by mouth, Disp: , Rfl:     nystatin (MYCOSTATIN) 132150 UNIT/GM powder, Apply 3 times daily as needed for recurrent skin infections (Patient not taking: Reported on 9/15/2020), Disp: 1 Bottle, Rfl: 5    Semaglutide (RYBELSUS) 14 MG TABS, Take 1 tablet by mouth daily (Patient not taking: Reported on 8/24/2020), Disp: 30 tablet, Rfl: 0    Physical Exam:  Vital signs and Nurse's note reviewed  Gen:  A&Ox3, NAD. Pleasant and cooperative. HEENT: PERRLA, EOMI, no scleral icterus  Neck:  no goiter  CVS: Regular rate and rhythm  Resp: Good bilateral air entry, no active wheezing, no labored breathing  Abd: soft, non-tender, non-distended, bowel sounds present. Ext: Moves all extremities, no gross focal motor deficits. Right posterior shoulder large lipoma that does NOT bother her. Bilateral wrist lipomas atop wrist bone that are painful and bothersome. Left upper arm 2 separate lipoma lesions lateral and medial, the medial one feel deeper, toward muscle, US image done on that one already, images reviewed. Left posterior calf lipoma that does NOT bother her.    Skin: No erythema or ulcerations     Labs:   Lab Results   Component Value Date    WBC 7.6 08/03/2020    HGB 14.6 08/03/2020    HCT 46.8 08/03/2020    MCV 88.3 08/03/2020     08/03/2020     Lab Results   Component Value Date     08/03/2020    K 4.2 08/03/2020     08/03/2020    CO2 25 08/03/2020    BUN 17 08/03/2020    CREATININE 0.82 08/03/2020    GLUCOSE 243 not adequate. Alternative studies include barium enema and virtual colonoscopy; however, if a lesion is seen, then one would still need to proceed with the gold standard colonoscopy to retrieve or biopsy the lesion. All the patient's questions are answered. Will proceed with colonoscopy under MAC. PCP clearance due to DM and HTN issues. She also says she has some upcoming routine bloodwork coming up mid October.        Sirena Ca DO, MPH, [address] [place] [address], [address] [place] [address] office [phone]  [place] office [phone]

## 2020-09-24 ENCOUNTER — TELEPHONE (OUTPATIENT)
Dept: SURGERY | Age: 60
End: 2020-09-24

## 2020-09-29 ENCOUNTER — OFFICE VISIT (OUTPATIENT)
Dept: PRIMARY CARE CLINIC | Age: 60
End: 2020-09-29

## 2020-09-29 VITALS
TEMPERATURE: 97.7 F | HEART RATE: 69 BPM | SYSTOLIC BLOOD PRESSURE: 138 MMHG | HEIGHT: 62 IN | BODY MASS INDEX: 36.77 KG/M2 | WEIGHT: 199.8 LBS | DIASTOLIC BLOOD PRESSURE: 66 MMHG | RESPIRATION RATE: 14 BRPM

## 2020-09-29 PROCEDURE — 99999 PR OFFICE/OUTPT VISIT,PROCEDURE ONLY: CPT | Performed by: NURSE PRACTITIONER

## 2020-10-06 RX ORDER — LABETALOL 100 MG/1
100 TABLET, FILM COATED ORAL 2 TIMES DAILY
Qty: 180 TABLET | Refills: 1 | Status: SHIPPED | OUTPATIENT
Start: 2020-10-06 | End: 2021-04-13

## 2020-10-06 NOTE — TELEPHONE ENCOUNTER
Phone call from patient requesting a refill of Labetalol that was ordered by Dr Armani Brewster in the ER,    Health Maintenance   Topic Date Due    Statin Therapy  1960    Diabetic retinal exam  07/22/2021 (Originally 8/8/2018)    Shingles Vaccine (1 of 2) 07/22/2021 (Originally 7/5/2009)    A1C test (Diabetic or Prediabetic)  10/22/2020    Diabetic foot exam  07/22/2021    Colon Cancer Screen FIT/FOBT  07/24/2021    Diabetic microalbuminuria test  07/31/2021    Lipid screen  07/31/2021    Potassium monitoring  08/03/2021    Creatinine monitoring  08/03/2021    Breast cancer screen  08/31/2022    Cervical cancer screen  08/19/2023    DTaP/Tdap/Td vaccine (2 - Td) 02/14/2025    Flu vaccine  Completed    Pneumococcal 0-64 years Vaccine  Completed    Hepatitis C screen  Completed    HIV screen  Completed    Hepatitis A vaccine  Aged Out    Hib vaccine  Aged Out    Meningococcal (ACWY) vaccine  Aged Out             (applicable per patient's age: Cancer Screenings, Depression Screening, Fall Risk Screening, Immunizations)    Hemoglobin A1C (%)   Date Value   07/22/2020 14.0   11/06/2017 12.6   07/18/2016 7.4   07/18/2016 7.4     Microalb/Crt.  Ratio (mcg/mg creat)   Date Value   07/31/2020 188 (H)     LDL Cholesterol (mg/dL)   Date Value   07/31/2020 208 (H)     LDL Calculated (mg/dL)   Date Value   11/10/2017 175 (A)     AST (U/L)   Date Value   07/31/2020 31     ALT (U/L)   Date Value   07/31/2020 60 (H)     BUN (mg/dL)   Date Value   08/03/2020 17      (goal A1C is < 7)   (goal LDL is <100) need 30-50% reduction from baseline     BP Readings from Last 3 Encounters:   09/29/20 138/66   09/17/20 (!) 145/78   09/15/20 (!) 198/96    (goal /80)      All Future Testing planned in [facility]:  Lab Frequency Next Occurrence   Lipid Panel Once 10/19/2020   Hepatic Function Panel Once 10/19/2020   COLONOSCOPY W/ OR W/O BIOPSY Once 07/31/2020       Next Visit Date:  Future Appointments   Date Time Provider Abdullahi Harding   10/19/2020  3:20 PM Rebeka Dumont, APRN - CNP [facility]            Patient Active Problem List:     Gastroenteritis, acute     Diabetes mellitus type 2 with complications, uncontrolled ([facility] [address]     HTN (hypertension)     Dyslipidemia     Essential hypertension     Uncontrolled type 2 diabetes mellitus with hyperglycemia (HCC)     Positive FIT (fecal immunochemical test)     Acute nonintractable headache

## 2020-10-16 ENCOUNTER — TELEPHONE (OUTPATIENT)
Dept: PRIMARY CARE CLINIC | Age: 60
End: 2020-10-16

## 2020-10-16 ENCOUNTER — HOSPITAL ENCOUNTER (OUTPATIENT)
Dept: PREADMISSION TESTING | Age: 60
Setting detail: SPECIMEN
Discharge: HOME | End: 2020-10-20
Payer: COMMERCIAL

## 2020-10-16 DIAGNOSIS — Z01.818 PREOP TESTING: Primary | ICD-10-CM

## 2020-10-16 PROCEDURE — U0003 INFECTIOUS AGENT DETECTION BY NUCLEIC ACID (DNA OR RNA); SEVERE ACUTE RESPIRATORY SYNDROME CORONAVIRUS 2 (SARS-COV-2) (CORONAVIRUS DISEASE [COVID-19]), AMPLIFIED PROBE TECHNIQUE, MAKING USE OF HIGH THROUGHPUT TECHNOLOGIES AS DESCRIBED BY CMS-2020-01-R: HCPCS

## 2020-10-16 PROCEDURE — C9803 HOPD COVID-19 SPEC COLLECT: HCPCS

## 2020-10-16 NOTE — TELEPHONE ENCOUNTER
Called patient and informed her that she could keep her appt on Monday. Verbalizes understanding.
She can come for her appointment.
138/66   09/17/20 (!) 145/78   09/15/20 (!) 198/96    (goal /80)      All Future Testing planned in [facility]:  Lab Frequency Next Occurrence   Lipid Panel Once 10/19/2020   Hepatic Function Panel Once 10/19/2020   COLONOSCOPY W/ OR W/O BIOPSY Once 07/31/2020       Next Visit Date:  Future Appointments   Date Time Provider Abdullahi Harding   10/19/2020  3:20 PM Derik Dumont, APRN - CNP [facility]            Patient Active Problem List:     Gastroenteritis, acute     Diabetes mellitus type 2 with complications, uncontrolled ([facility] [address]     HTN (hypertension)     Dyslipidemia     Essential hypertension     Uncontrolled type 2 diabetes mellitus with hyperglycemia ([facility] [address]     Positive FIT (fecal immunochemical test)     Acute nonintractable headache

## 2020-10-17 ENCOUNTER — HOSPITAL ENCOUNTER (OUTPATIENT)
Age: 60
Discharge: HOME | End: 2020-10-17
Payer: COMMERCIAL

## 2020-10-17 LAB
ALBUMIN SERPL-MCNC: 4.6 G/DL (ref 3.5–5.2)
ALBUMIN/GLOBULIN RATIO: 1.5 (ref 1–2.5)
ALP BLD-CCNC: 65 U/L (ref 35–104)
ALT SERPL-CCNC: 55 U/L (ref 5–33)
AST SERPL-CCNC: 44 U/L
BILIRUB SERPL-MCNC: 0.45 MG/DL (ref 0.3–1.2)
BILIRUBIN DIRECT: <0.08 MG/DL
BILIRUBIN, INDIRECT: ABNORMAL MG/DL (ref 0–1)
CHOLESTEROL/HDL RATIO: 5.1
CHOLESTEROL: 218 MG/DL
GLOBULIN: ABNORMAL G/DL (ref 1.5–3.8)
HDLC SERPL-MCNC: 43 MG/DL
LDL CHOLESTEROL: 145 MG/DL (ref 0–130)
TOTAL PROTEIN: 7.7 G/DL (ref 6.4–8.3)
TRIGL SERPL-MCNC: 149 MG/DL
VLDLC SERPL CALC-MCNC: ABNORMAL MG/DL (ref 1–30)

## 2020-10-17 PROCEDURE — 80076 HEPATIC FUNCTION PANEL: CPT

## 2020-10-17 PROCEDURE — 36415 COLL VENOUS BLD VENIPUNCTURE: CPT

## 2020-10-17 PROCEDURE — 80061 LIPID PANEL: CPT

## 2020-10-19 ENCOUNTER — OFFICE VISIT (OUTPATIENT)
Dept: PRIMARY CARE CLINIC | Age: 60
End: 2020-10-19
Payer: COMMERCIAL

## 2020-10-19 VITALS
HEART RATE: 76 BPM | SYSTOLIC BLOOD PRESSURE: 134 MMHG | BODY MASS INDEX: 35.37 KG/M2 | DIASTOLIC BLOOD PRESSURE: 82 MMHG | RESPIRATION RATE: 20 BRPM | TEMPERATURE: 97.6 F | WEIGHT: 193.4 LBS

## 2020-10-19 LAB
HBA1C MFR BLD: 8.6 %
SARS-COV-2, NAA: NOT DETECTED

## 2020-10-19 PROCEDURE — 3052F HG A1C>EQUAL 8.0%<EQUAL 9.0%: CPT | Performed by: NURSE PRACTITIONER

## 2020-10-19 PROCEDURE — 83036 HEMOGLOBIN GLYCOSYLATED A1C: CPT | Performed by: NURSE PRACTITIONER

## 2020-10-19 PROCEDURE — 99214 OFFICE O/P EST MOD 30 MIN: CPT | Performed by: NURSE PRACTITIONER

## 2020-10-19 RX ORDER — ORAL SEMAGLUTIDE 14 MG/1
7 TABLET ORAL
Qty: 90 TABLET | Refills: 3 | Status: SHIPPED | OUTPATIENT
Start: 2020-10-19 | End: 2020-10-19 | Stop reason: DRUGHIGH

## 2020-10-19 RX ORDER — ORAL SEMAGLUTIDE 14 MG/1
14 TABLET ORAL
COMMUNITY
End: 2020-10-19 | Stop reason: SDUPTHER

## 2020-10-19 RX ORDER — PIOGLITAZONEHYDROCHLORIDE 30 MG/1
30 TABLET ORAL DAILY
Qty: 90 TABLET | Refills: 3 | Status: SHIPPED | OUTPATIENT
Start: 2020-10-19 | End: 2021-05-25 | Stop reason: ALTCHOICE

## 2020-10-19 RX ORDER — ORAL SEMAGLUTIDE 7 MG/1
7 TABLET ORAL DAILY
Qty: 90 TABLET | Refills: 3 | Status: SHIPPED | OUTPATIENT
Start: 2020-10-19 | End: 2021-10-19

## 2020-10-19 ASSESSMENT — ENCOUNTER SYMPTOMS
ABDOMINAL PAIN: 0
RHINORRHEA: 0
DIARRHEA: 0
ORTHOPNEA: 0
CONSTIPATION: 0
BLURRED VISION: 0
NAUSEA: 1
COUGH: 0
VOMITING: 0
SHORTNESS OF BREATH: 0
WHEEZING: 0
SORE THROAT: 0

## 2020-10-19 NOTE — PATIENT INSTRUCTIONS
SURVEY:    You may be receiving a survey from [employer] regarding your visit today. Please complete the survey to enable us to provide the highest quality of care to you and your family. If you cannot score us a very good on any question, please call the office to discuss how we could have made your experience a very good one. Thank you. Patient Education        Counting Carbohydrates: Care Instructions  Your Care Instructions     You don't have to eat special foods when you have diabetes. You just have to be careful to eat healthy foods. Carbohydrates (carbs) raise blood sugar higher and quicker than any other nutrient. Carbs are found in desserts, breads and cereals, and fruit. They're also in starchy vegetables. These include potatoes, corn, and grains such as rice and pasta. Carbs are also in milk and yogurt. The more carbs you eat at one time, the higher your blood sugar will rise. Spreading carbs all through the day helps keep your blood sugar levels within your target range. Counting carbs is one of the best ways to keep your blood sugar under control. If you use insulin, counting carbs helps you match the right amount of insulin to the number of grams of carbs in a meal. Then you can change your diet and insulin dose as needed. Testing your blood sugar several times a day can help you learn how carbs affect your blood sugar. A registered dietitian or certified diabetes educator can help you plan meals and snacks. Follow-up care is a key part of your treatment and safety. Be sure to make and go to all appointments, and call your doctor if you are having problems. It's also a good idea to know your test results and keep a list of the medicines you take. How can you care for yourself at home?   Know your daily amount of carbohydrates  Your daily amount depends on several things, such as your weight, how active you are, which diabetes medicines you take, and what your goals are for your blood sugar levels. A registered dietitian or certified diabetes educator can help you plan how many carbs to include in each meal and snack. For most adults, a guideline for the daily amount of carbs is:  · 45 to 60 grams at each meal. That's about the same as 3 to 4 carbohydrate servings. · 15 to 20 grams at each snack. That's about the same as 1 carbohydrate serving. Count carbs  Counting carbs lets you know how much rapid-acting insulin to take before you eat. If you use an insulin pump, you get a constant rate of insulin during the day. So the pump must be programmed at meals. This gives you extra insulin to cover the rise in blood sugar after meals. If you take insulin:  · Learn your own insulin-to-carb ratio. You and your diabetes health professional will figure out the ratio. You can do this by testing your blood sugar after meals. For example, you may need a certain amount of insulin for every 15 grams of carbs. · Add up the carb grams in a meal. Then you can figure out how many units of insulin to take based on your insulin-to-carb ratio. · Exercise lowers blood sugar. You can use less insulin than you would if you were not doing exercise. Keep in mind that timing matters. If you exercise within 1 hour after a meal, your body may need less insulin for that meal than it would if you exercised 3 hours after the meal. Test your blood sugar to find out how exercise affects your need for insulin. If you do or don't take insulin:  · Look at labels on packaged foods. This can tell you how many carbs are in a serving. You can also use guides from the American Diabetes Association. · Be aware of portions, or serving sizes. If a package has two servings and you eat the whole package, you need to double the number of grams of carbohydrate listed for one serving. · Protein, fat, and fiber do not raise blood sugar as much as carbs do.  If you eat a lot of these nutrients in a meal, your blood sugar will rise more slowly than it would otherwise. Eat from all food groups  · Eat at least three meals a day. · Plan meals to include food from all the food groups. The food groups include grains, fruits, dairy, proteins, and vegetables. · Talk to your dietitian or diabetes educator about ways to add limited amounts of sweets into your meal plan. · If you drink alcohol, talk to your doctor. It may not be recommended when you are taking certain diabetes medicines. Where can you learn more? Go to https://[employer].iMega. org and sign in to your [email or web address] account. Enter K390 in the [employer] box to learn more about \"Counting Carbohydrates: Care Instructions. \"     If you do not have an account, please click on the \"Sign Up Now\" link. Current as of: December 20, 2019               Content Version: 12.6  © 1730-2044 [employer]. Care instructions adapted under license by [facility]. If you have questions about a medical condition or this instruction, always ask your healthcare professional. [place] [address] any warranty or liability for your use of this information.

## 2020-10-19 NOTE — PROGRESS NOTES
Name: Morteza Jeronimo  : 1960         Chief Complaint:     Chief Complaint   Patient presents with    Hypertension     routine check    Diabetes       History of Present Illness: Morteaz Jeronimo is a 63 y.o.  female who presents with Hypertension (routine check) and Diabetes      Maureen Palumbo is here today for a routine office visit. Patient is compliant with all of her medications. She is having trouble with the oral semaglutide. Once the medication was increased to the 14 mg dose she became nauseous daily with taking the medication. This is been for a few weeks and she wishes to decrease the dose. See below for further comments    Hypertension   This is a chronic problem. The current episode started more than 1 year ago. The problem has been gradually improving since onset. The problem is controlled. Pertinent negatives include no anxiety, blurred vision, chest pain, headaches, malaise/fatigue, neck pain, orthopnea, palpitations, peripheral edema, PND, shortness of breath or sweats. There are no associated agents to hypertension. Risk factors for coronary artery disease include stress, obesity, post-menopausal state, diabetes mellitus, dyslipidemia and family history. Past treatments include ACE inhibitors and beta blockers. The current treatment provides mild improvement. Compliance problems include exercise and diet. There is no history of kidney disease, CAD/MI, CVA or heart failure. There is no history of chronic renal disease. Diabetes   She presents for her follow-up diabetic visit. She has type 2 diabetes mellitus. Her disease course has been improving. There are no hypoglycemic associated symptoms. Pertinent negatives for hypoglycemia include no dizziness, headaches or sweats. Pertinent negatives for diabetes include no blurred vision, no chest pain, no fatigue, no polydipsia, no polyphagia and no polyuria. There are no hypoglycemic complications. Symptoms are improving.  Pertinent negatives for diabetic complications include no CVA, heart disease, nephropathy or peripheral neuropathy. Risk factors for coronary artery disease include dyslipidemia, diabetes mellitus, family history, obesity, hypertension, sedentary lifestyle, post-menopausal and stress. Current diabetic treatment includes oral agent (monotherapy). She is compliant with treatment all of the time. Her weight is stable. She is following a generally healthy diet. Meal planning includes avoidance of concentrated sweets. She has had a previous visit with a dietitian. She rarely participates in exercise. Her home blood glucose trend is decreasing rapidly. Her breakfast blood glucose range is generally 140-180 mg/dl. An ACE inhibitor/angiotensin II receptor blocker is being taken. She does not see a podiatrist.Eye exam is current. Past Medical History:     Past Medical History:   Diagnosis Date    Diabetes mellitus ([address]     Hepatitis A     Hyperlipidemia     Hypertension       Reviewed all health maintenance requirements and ordered appropriate tests  Health Maintenance Due   Topic Date Due    A1C test (Diabetic or Prediabetic)  10/22/2020       Past Surgical History:     Past Surgical History:   Procedure Laterality Date    APPENDECTOMY      CHOLECYSTECTOMY      HYSTERECTOMY      TUBAL LIGATION          Medications:       Prior to Admission medications    Medication Sig Start Date End Date Taking?  Authorizing Provider   Semaglutide (RYBELSUS) 7 MG TABS Take 7 mg by mouth daily 10/19/20  Yes Sharolyn Blush Might, APRN - CNP   pioglitazone (ACTOS) 30 MG tablet Take 1 tablet by mouth daily 10/19/20  Yes Sharolyn Blush Might, APRN - CNP   verapamil (CALAN SR) 120 MG extended release tablet Take 1 tablet by mouth once daily 10/6/20  Yes Sharolyn Blush Might, APRN - CNP   labetalol (NORMODYNE) 100 MG tablet Take 1 tablet by mouth 2 times daily 10/6/20  Yes Sharolyn Blush Might, APRN - CNP   lisinopril (PRINIVIL;ZESTRIL) 20 MG tablet Take 1 tablet by mouth Rate and Rhythm: Normal rate and regular rhythm. Heart sounds: No murmur. Pulmonary:      Effort: Pulmonary effort is normal.      Breath sounds: Normal breath sounds. No wheezing. Abdominal:      General: Bowel sounds are normal. There is no distension. Palpations: Abdomen is soft. Tenderness: There is no abdominal tenderness. Musculoskeletal:      Right lower leg: No edema. Left lower leg: No edema. Lymphadenopathy:      Cervical: No cervical adenopathy. Skin:     General: Skin is warm and dry. Findings: No rash. Neurological:      Mental Status: She is alert and oriented to person, place, and time. Psychiatric:         Mood and Affect: Mood normal.         Behavior: Behavior normal.         Data:     Lab Results   Component Value Date     08/03/2020    K 4.2 08/03/2020     08/03/2020    CO2 25 08/03/2020    BUN 17 08/03/2020    CREATININE 0.82 08/03/2020    GLUCOSE 243 08/03/2020    PROT 7.7 10/17/2020    LABALBU 4.6 10/17/2020    BILITOT 0.45 10/17/2020    ALKPHOS 65 10/17/2020    AST 44 10/17/2020    ALT 55 10/17/2020     Lab Results   Component Value Date    WBC 7.6 08/03/2020    RBC 5.30 08/03/2020    HGB 14.6 08/03/2020    HCT 46.8 08/03/2020    MCV 88.3 08/03/2020    MCH 27.5 08/03/2020    MCHC 31.2 08/03/2020    RDW 13.2 08/03/2020     08/03/2020    MPV 9.9 08/03/2020     Lab Results   Component Value Date    TSH 1.78 07/18/2016     Lab Results   Component Value Date    CHOL 218 10/17/2020    CHOL 271 11/10/2017    HDL 43 10/17/2020    LABA1C 8.6 10/19/2020       Assessment/Plan:      Diagnosis Orders   1. Diabetes mellitus type 2 with complications, uncontrolled (HCC)  POCT glycosylated hemoglobin (Hb A1C)    Semaglutide (RYBELSUS) 7 MG TABS    pioglitazone (ACTOS) 30 MG tablet   2. Essential hypertension       Her A1c has dramatically improved from greater than 14 to 8.6. We will continue oral semaglutide at 7 mg daily.   We will also add

## 2020-10-20 ENCOUNTER — TELEPHONE (OUTPATIENT)
Dept: PRIMARY CARE CLINIC | Age: 60
End: 2020-10-20

## 2020-10-22 ENCOUNTER — ANESTHESIA EVENT (OUTPATIENT)
Dept: OPERATING ROOM | Age: 60
End: 2020-10-22
Payer: COMMERCIAL

## 2020-10-23 ENCOUNTER — HOSPITAL ENCOUNTER (OUTPATIENT)
Age: 60
Setting detail: OUTPATIENT SURGERY
Discharge: HOME | End: 2020-10-23
Attending: SURGERY | Admitting: SURGERY
Payer: COMMERCIAL

## 2020-10-23 ENCOUNTER — ANESTHESIA (OUTPATIENT)
Dept: OPERATING ROOM | Age: 60
End: 2020-10-23
Payer: COMMERCIAL

## 2020-10-23 ENCOUNTER — HOSPITAL ENCOUNTER (OUTPATIENT)
Dept: PREADMISSION TESTING | Age: 60
Setting detail: OUTPATIENT SURGERY
Discharge: HOME | End: 2020-10-27
Payer: COMMERCIAL

## 2020-10-23 VITALS
TEMPERATURE: 97.2 F | RESPIRATION RATE: 16 BRPM | HEIGHT: 62 IN | SYSTOLIC BLOOD PRESSURE: 136 MMHG | OXYGEN SATURATION: 99 % | WEIGHT: 193 LBS | BODY MASS INDEX: 35.51 KG/M2 | DIASTOLIC BLOOD PRESSURE: 60 MMHG | HEART RATE: 74 BPM

## 2020-10-23 VITALS
RESPIRATION RATE: 14 BRPM | OXYGEN SATURATION: 99 % | DIASTOLIC BLOOD PRESSURE: 34 MMHG | TEMPERATURE: 97.2 F | SYSTOLIC BLOOD PRESSURE: 89 MMHG

## 2020-10-23 PROBLEM — Z12.11 SCREENING FOR MALIGNANT NEOPLASM OF COLON: Status: ACTIVE | Noted: 2020-10-23

## 2020-10-23 LAB
GLUCOSE BLD-MCNC: 122 MG/DL (ref 74–100)
SARS-COV-2, RAPID: NORMAL
SARS-COV-2: NORMAL
SARS-COV-2: NOT DETECTED
SOURCE: NORMAL

## 2020-10-23 PROCEDURE — 3700000001 HC ADD 15 MINUTES (ANESTHESIA): Performed by: SURGERY

## 2020-10-23 PROCEDURE — 2709999900 HC NON-CHARGEABLE SUPPLY: Performed by: SURGERY

## 2020-10-23 PROCEDURE — 3609010400 HC COLONOSCOPY POLYPECTOMY HOT BIOPSY: Performed by: SURGERY

## 2020-10-23 PROCEDURE — 88305 TISSUE EXAM BY PATHOLOGIST: CPT

## 2020-10-23 PROCEDURE — 2500000003 HC RX 250 WO HCPCS: Performed by: NURSE ANESTHETIST, CERTIFIED REGISTERED

## 2020-10-23 PROCEDURE — C9803 HOPD COVID-19 SPEC COLLECT: HCPCS

## 2020-10-23 PROCEDURE — 45380 COLONOSCOPY AND BIOPSY: CPT | Performed by: SURGERY

## 2020-10-23 PROCEDURE — 7100000011 HC PHASE II RECOVERY - ADDTL 15 MIN: Performed by: SURGERY

## 2020-10-23 PROCEDURE — 2580000003 HC RX 258: Performed by: SURGERY

## 2020-10-23 PROCEDURE — 82947 ASSAY GLUCOSE BLOOD QUANT: CPT

## 2020-10-23 PROCEDURE — 3609009300 HC COLONOSCOPY BIOPSY/STOMA: Performed by: SURGERY

## 2020-10-23 PROCEDURE — 7100000010 HC PHASE II RECOVERY - FIRST 15 MIN: Performed by: SURGERY

## 2020-10-23 PROCEDURE — 6360000002 HC RX W HCPCS: Performed by: NURSE ANESTHETIST, CERTIFIED REGISTERED

## 2020-10-23 PROCEDURE — 3700000000 HC ANESTHESIA ATTENDED CARE: Performed by: SURGERY

## 2020-10-23 PROCEDURE — U0003 INFECTIOUS AGENT DETECTION BY NUCLEIC ACID (DNA OR RNA); SEVERE ACUTE RESPIRATORY SYNDROME CORONAVIRUS 2 (SARS-COV-2) (CORONAVIRUS DISEASE [COVID-19]), AMPLIFIED PROBE TECHNIQUE, MAKING USE OF HIGH THROUGHPUT TECHNOLOGIES AS DESCRIBED BY CMS-2020-01-R: HCPCS

## 2020-10-23 PROCEDURE — 45385 COLONOSCOPY W/LESION REMOVAL: CPT | Performed by: SURGERY

## 2020-10-23 RX ORDER — PROPOFOL 10 MG/ML
INJECTION, EMULSION INTRAVENOUS PRN
Status: DISCONTINUED | OUTPATIENT
Start: 2020-10-23 | End: 2020-10-23 | Stop reason: SDUPTHER

## 2020-10-23 RX ORDER — PROPOFOL 10 MG/ML
INJECTION, EMULSION INTRAVENOUS CONTINUOUS PRN
Status: DISCONTINUED | OUTPATIENT
Start: 2020-10-23 | End: 2020-10-23 | Stop reason: SDUPTHER

## 2020-10-23 RX ORDER — SODIUM CHLORIDE, SODIUM LACTATE, POTASSIUM CHLORIDE, CALCIUM CHLORIDE 600; 310; 30; 20 MG/100ML; MG/100ML; MG/100ML; MG/100ML
INJECTION, SOLUTION INTRAVENOUS CONTINUOUS
Status: DISCONTINUED | OUTPATIENT
Start: 2020-10-23 | End: 2020-10-23 | Stop reason: HOSPADM

## 2020-10-23 RX ORDER — LIDOCAINE HYDROCHLORIDE 20 MG/ML
INJECTION, SOLUTION EPIDURAL; INFILTRATION; INTRACAUDAL; PERINEURAL PRN
Status: DISCONTINUED | OUTPATIENT
Start: 2020-10-23 | End: 2020-10-23 | Stop reason: SDUPTHER

## 2020-10-23 RX ADMIN — LIDOCAINE HYDROCHLORIDE 100 MG: 20 INJECTION, SOLUTION EPIDURAL; INFILTRATION; INTRACAUDAL; PERINEURAL at 11:51

## 2020-10-23 RX ADMIN — SODIUM CHLORIDE, POTASSIUM CHLORIDE, SODIUM LACTATE AND CALCIUM CHLORIDE: 600; 310; 30; 20 INJECTION, SOLUTION INTRAVENOUS at 11:12

## 2020-10-23 RX ADMIN — PROPOFOL 80 MG: 10 INJECTION, EMULSION INTRAVENOUS at 11:51

## 2020-10-23 RX ADMIN — PROPOFOL 180 MCG/KG/MIN: 10 INJECTION, EMULSION INTRAVENOUS at 11:51

## 2020-10-23 ASSESSMENT — PAIN SCALES - GENERAL
PAINLEVEL_OUTOF10: 0
PAINLEVEL_OUTOF10: 0

## 2020-10-23 ASSESSMENT — PAIN - FUNCTIONAL ASSESSMENT: PAIN_FUNCTIONAL_ASSESSMENT: 0-10

## 2020-10-23 NOTE — ANESTHESIA PRE PROCEDURE
Department of Anesthesiology  Preprocedure Note       Name:  Victorino Samuel   Age:  63 y.o.  :  1960                                          MRN:  [id number]         Date:  10/23/2020      Surgeon: Marylin Barraza):  Ronnie Gutierrez DO    Procedure: Procedure(s):  COLORECTAL CANCER SCREENING, NOT HIGH RISK    Medications prior to admission:   Prior to Admission medications    Medication Sig Start Date End Date Taking? Authorizing Provider   Semaglutide (RYBELSUS) 7 MG TABS Take 7 mg by mouth daily 10/19/20  Yes GIOVANI Baum CNP   pioglitazone (ACTOS) 30 MG tablet Take 1 tablet by mouth daily 10/19/20  Yes GIOVANI Baum CNP   verapamil (CALAN SR) 120 MG extended release tablet Take 1 tablet by mouth once daily 10/6/20  Yes GIOVANI Baum CNP   labetalol (NORMODYNE) 100 MG tablet Take 1 tablet by mouth 2 times daily 10/6/20  Yes GIOVANI Baum CNP   lisinopril (PRINIVIL;ZESTRIL) 20 MG tablet Take 1 tablet by mouth daily 9/15/20  Yes GIOVANI Painter CNP   ezetimibe (ZETIA) 10 MG tablet Take 1 tablet by mouth daily 20  Yes GIOVANI Painter CNP       Current medications:    Current Facility-Administered Medications   Medication Dose Route Frequency Provider Last Rate Last Dose    lactated ringers infusion   Intravenous Continuous Marie I Nazemi,  mL/hr at 10/23/20 1112         Allergies:     Allergies   Allergen Reactions    Statins Swelling       Problem List:    Patient Active Problem List   Diagnosis Code    Gastroenteritis, acute K52.9    Diabetes mellitus type 2 with complications, uncontrolled (HCC) E11.8, E11.65    HTN (hypertension) I10    Dyslipidemia E78.5    Essential hypertension I10    Uncontrolled type 2 diabetes mellitus with hyperglycemia (HCC) E11.65    Positive FIT (fecal immunochemical test) R19.5    Acute nonintractable headache R51.9       Past Medical History:        Diagnosis Date    Diabetes mellitus ([facility] [address]     Hepatitis A     Hyperlipidemia     Hypertension        Past Surgical History:        Procedure Laterality Date    APPENDECTOMY      CHOLECYSTECTOMY      HYSTERECTOMY      TUBAL LIGATION         Social History:    Social History     Tobacco Use    Smoking status: Never Smoker    Smokeless tobacco: Never Used   Substance Use Topics    Alcohol use: No                                Counseling given: Not Answered      Vital Signs (Current):   Vitals:    10/23/20 1105   BP: (!) 145/82   Pulse: 78   Resp: 18   Temp: 36.2 °C (97.2 °F)   TempSrc: Temporal   SpO2: 98%   Weight: 193 lb (87.5 kg)   Height: 5' 2\" (1.575 m)                                              BP Readings from Last 3 Encounters:   10/23/20 (!) 145/82   10/19/20 134/82   09/29/20 138/66       NPO Status: Time of last liquid consumption: 0330                        Time of last solid consumption: 1800                        Date of last liquid consumption: 10/23/20                        Date of last solid food consumption: 10/20/20    BMI:   Wt Readings from Last 3 Encounters:   10/23/20 193 lb (87.5 kg)   10/19/20 193 lb 6.4 oz (87.7 kg)   09/29/20 199 lb 12.8 oz (90.6 kg)     Body mass index is 35.3 kg/m². CBC:   Lab Results   Component Value Date    WBC 7.6 08/03/2020    RBC 5.30 08/03/2020    HGB 14.6 08/03/2020    HCT 46.8 08/03/2020    MCV 88.3 08/03/2020    RDW 13.2 08/03/2020     08/03/2020       CMP:   Lab Results   Component Value Date     08/03/2020    K 4.2 08/03/2020     08/03/2020    CO2 25 08/03/2020    BUN 17 08/03/2020    CREATININE 0.82 08/03/2020    GFRAA >60 08/03/2020    LABGLOM >60 08/03/2020    GLUCOSE 243 08/03/2020    PROT 7.7 10/17/2020    CALCIUM 9.9 08/03/2020    BILITOT 0.45 10/17/2020    ALKPHOS 65 10/17/2020    AST 44 10/17/2020    ALT 55 10/17/2020       POC Tests: No results for input(s): POCGLU, POCNA, POCK, POCCL, POCBUN, POCHEMO, POCHCT in the last 72 hours.     Coags: No results found for: PROTIME, INR, APTT    HCG (If Applicable): No results found for: PREGTESTUR, PREGSERUM, HCG, HCGQUANT     ABGs: No results found for: PHART, PO2ART, NPQ1ANY, SAO7RAW, BEART, U9CSPDMB     Type & Screen (If Applicable):  No results found for: LABABO, LABRH    Drug/Infectious Status (If Applicable):  Lab Results   Component Value Date    HEPCAB NONREACTIVE 08/25/2016       COVID-19 Screening (If Applicable):   Lab Results   Component Value Date    COVID19 Not Detected 10/16/2020         Anesthesia Evaluation   no history of anesthetic complications:   Airway: Mallampati: II  TM distance: >3 FB   Neck ROM: full  Mouth opening: > = 3 FB Dental:          Pulmonary:Negative Pulmonary ROS and normal exam  breath sounds clear to auscultation                             Cardiovascular:  Exercise tolerance: good (>4 METS),   (+) hypertension:,       ECG reviewed                        Neuro/Psych:   Negative Neuro/Psych ROS              GI/Hepatic/Renal:   (+) GERD: poorly controlled, bowel prep, morbid obesity          Endo/Other:    (+) DiabetesType II DM, poorly controlled, , .                 Abdominal:   (+) obese,         Vascular: negative vascular ROS. Anesthesia Plan      general and TIVA     ASA 3       Induction: intravenous. Anesthetic plan and risks discussed with patient.                       GIOVANI Stacy - CRNA   10/23/2020

## 2020-10-23 NOTE — ANESTHESIA POSTPROCEDURE EVALUATION
Department of Anesthesiology  Postprocedure Note    Patient: Ermelinda Woodard  MRN: [id number]  YOB: 1960  Date of evaluation: 10/23/2020  Time:  1:04 PM     Procedure Summary     Date:  10/23/20 Room / Location:  [address] [place] [address]    Anesthesia Start:  2065 Anesthesia Stop:  1253    Procedures:       COLONOSCOPY POLYPECTOMY HOT BIOPSY (N/A )      COLONOSCOPY BIOPSY/STOMA Diagnosis:  (SCREENING, OVERDUE)    Surgeon:  Juan José Root DO Responsible Provider:  GIOVANI Dumont CRNA    Anesthesia Type:  general, TIVA ASA Status:  3          Anesthesia Type: general, TIVA    Mikaela Phase I: Mikaela Score: 5    Mikaela Phase II:      Last vitals: Reviewed and per EMR flowsheets.        Anesthesia Post Evaluation    Patient location during evaluation: bedside  Patient participation: complete - patient participated  Level of consciousness: awake and alert  Pain score: 0  Airway patency: patent  Nausea & Vomiting: no nausea and no vomiting  Complications: no  Cardiovascular status: hemodynamically stable  Respiratory status: acceptable  Hydration status: stable

## 2020-10-23 NOTE — PROGRESS NOTES
Patient verbalizes readiness for discharge. Discharge instructions given to patient and responsible adult, answered all questions, and verbalized understanding of discharge instructions. Discharge Criteria    Inpatients must meet Criteria 1 through 7. All other patients are either YES or N/A. If a NO is chosen then Anesthesia or Surgeon must be notified. 1.  Minimum 30 minutes after last dose of sedative medication, minimum 120 minutes after last dose of reversal agent. Yes      2. Systolic BP stable within 20 mmHg for 30 minutes & systolic BP between 90 & 200 or within 10 mmHg of baseline. Yes      3. Pulse between 60 and 100 or within 10 bpm of baseline. Yes      4. Spontaneous respiratory rate >/= 10 per minute. Yes      5. SaO2 >/= 95 or  >/= baseline. Yes      6. Able to cough and swallow or return to baseline function. Yes      7. Alert and oriented or return to baseline mental status. Yes      8. Demonstrates controlled, coordinated movements, ambulates with steady gait, or return to baseline activity function. Yes      9. Minimal or no pain or nausea, or at a level tolerable and acceptable to patient. Yes, pt complains of some nausea but states she wishes to go home at this time. 10. Takes and retains oral fluids as allowed. Yes      11. Procedural / perioperative site stable. Minimal or no bleeding. Yes          12. If GI endoscopy procedure, minimal or no abdominal distention or passing flatus. Yes      13. Written discharge instructions and emergency telephone number provided. Yes      14. Accompanied by a responsible adult.     Yes

## 2020-10-23 NOTE — H&P
GENERAL SURGERY H&P        Patient's Name/ Date of Birth/ Gender: Marilin Amaya / 1960 (63 y.o.) / female      PCP: GIOVANI Coleman CNP  Referring: Cari Hernandez CNP     History of present Illness:  Patient is a pleasant 63 y.o. female  kindly referred by Cari Hernandez (he called me last week to discuss patient) for symptomatic lipomas as described below. She says she is working on diabetes and blood pressure control issues with PCP. She has various lipomas that some are painful and bother her, others are not bothersome. She is also well overdue for screening colonoscopy. I recommend that actually before the lipomas. She said she had one 15 years ago elsewhere in the office. This may have just been a flexible sigmoidoscopy if in the office and plus it has been over 10 years anyways.      Past Medical History:  has a past medical history of Diabetes mellitus ([address] Hepatitis A, Hyperlipidemia, and Hypertension.     Past Surgical History:   Past Surgical History         Past Surgical History:   Procedure Laterality Date    APPENDECTOMY        CHOLECYSTECTOMY        HYSTERECTOMY        TUBAL LIGATION               Social History:  reports that she has never smoked. She has never used smokeless tobacco. She reports that she does not drink alcohol or use drugs.     Family History: family history includes Diabetes in her father.     Review of Systems:   General: Denies fever, chills, night sweats, weight loss, malaise, fatigue  HEENT: Denies sore throat, sinus problems, allergic rhinosinusitis  Card: Denies chest pain, palpitations, orthopnea/PND. HTN. Pulm: Denies cough, shortness of breath, dyspnea on exertion  GI:  per HPI. : Denies polyuria, dysuria, hematuria  Endo: DM  Heme: neg  Psych: neg  Neuro: Denies h/o CVA, TIA  Skin: Denies rashes, ulcers- see exam, lipomas  Musculoskeletal: no gross motor dysfunction.  OA     Allergies: Statins     Current Meds:  Current Medication   Current Outpatient 08/03/2020     BUN 17 08/03/2020     CREATININE 0.82 08/03/2020     GLUCOSE 243 08/03/2020     CALCIUM 9.9 08/03/2020            Lab Results   Component Value Date     ALKPHOS 57 01/09/2015     ALT 60 07/31/2020     AST 31 07/31/2020     PROT 5.8 01/09/2015     BILITOT 0.40 01/09/2015     LABALBU 4.3 01/27/2015      No results found for: LACTA        Lab Results   Component Value Date     AMYLASE 48 01/08/2015            Lab Results   Component Value Date     LIPASE 62 01/08/2015      No results found for: INR     Radiologic Studies:  EXAMINATION:    NONVASCULAR ULTRASOUND OF THE LEFT EXTREMITY         9/8/2020 12:49 pm         COMPARISON:    None.         HISTORY:    ORDERING SYSTEM PROVIDED HISTORY: Mass of left upper extremity    TECHNOLOGIST PROVIDED HISTORY:    left upper extremity mass about Antecubital fossa         FINDINGS:    Multiple grayscale and color Doppler images of the left antecubital fossa    were obtained.  Corresponding to the palpable lesion, there is a well-defined    isoechoic mass measuring 2.8 x 1.4 x 2.6 cm without internal vascularity    favoring a lipoma.              Impression     2.8 cm subcutaneous mass favoring a lipoma.             Impressions/Recommendations:      Symptomatic, painful, compressing lipomas she wants removed: bilateral wrist (not ganglion cysts based on exam), left upper extremity medial and lateral lipomas above elbow.     US image of medial upper left arm reviewed. Deeper, near muscle.     Plan excision UNDER GENERAL, need to inject local AFTER, not before, incision. Local first will make the lesions harder to isolate.      Need medical clearance first due to DM and HTN control issues as this is elective procedure.      Risks and benefits discussed in detail including recurrence, bleeding, infection, etc.         2) well overdue for screening colonoscopy.  The scope should be done first.   Risks and benefits of colonoscopy have been discussed in detail with the

## 2020-10-23 NOTE — OP NOTE
Operative Note      Patient: Samantha Greco  YOB: 1960  MRN: [id number]   Alvino Dumont, APRN - CNP      Date of Procedure: 10/23/2020    Pre-Op Diagnosis: overdue screening colonoscopy    Post-Op Diagnosis: left colon diverticulosis. several polyps. see below. redundant colon. Procedure:    Colonoscopy to cecum with hot snare cecal polypectomy, cold biopsies cecal tissue adjacent (unclear if flat polyp or polypoid tissue, at IC valve, NBI used). Transverse colon polypectomy  hot snare. Proximal rectal polyps x 2 removed (hot snare and cold forceps polypectomy method). Distal large separate pedunculated rectal polyp less than 10 cm from AV removed via hot snare polypectomy. Surgeon(s):  Brooklynn Looney DO    Anesthesia: Monitor Anesthesia Care    Estimated Blood Loss (mL): less than 1 ml    Complications: None    Specimens:   1) cecal polyp  2) cecal biopsies adjacent to #1  3) transverse colon polyp  4) proximal rectal polyps x 2  5) large distal polyp rectal    Procedure details:    I do meet with the patient in preoperative holding area. Please see H&P for indications for the above named procedure. Patient was brought to the endoscopy suite and placed under monitored anesthesia. Patient was positioned in left lateral position. Time out called and procedure confirmed. The lubricated variable stiffness pediatric colonoscope was carefully passed under direct vision into the rectum, advanced through the sigmoid colon, transverse colon, ascending colon and the cecum. The ileocecal valve was well visualized. Additional findings:    Findings:     Cecum: normal cecal pouch. At the region of the IC valve, there was the appearance of a cecal polyp and narrow band imaging was performed and the tissue appeared to be a polyp and hot snare polypectomy was performed. Photos taken.  Adjacent to this was tissue that was unclear if polyp/polypoid/hyperplastic tissue since it was at the valve region, so separate cold biopsies of this were taken for analysis. Ascending: normal  Transverse: hot snare polypectomy done on polyp  Descending: normal  Sigmoid: normal  Rectum: proximal: 2 polyp removed via both cold forceps polypectomy and hot snare polypectomy. Distal: large pedunculated polyp removed via hot snare polypectomy and submitted separately. 10 cm from anal verge. Rectal exam: negative  Bowel prep quality: excellent    At the distal 1/3 of the rectum, the scope was retroflexed and was negative. The patient tolerated the procedure well. The abdomen was soft after the procedure. I spoke with pt/family afterwards. Next colonoscopy pending pathology report. Definitely next colonoscopy no later than 3 years. Pending pathology may need repeat scope anywhere from 6 months to 1 year. Will let patient know.        Electronically signed by Hillman Hashimoto, DO, FACOS, DYLON on 10/23/2020 at 11:29 AM

## 2020-10-27 LAB — SURGICAL PATHOLOGY REPORT: NORMAL

## 2020-10-28 ENCOUNTER — ANESTHESIA EVENT (OUTPATIENT)
Dept: OPERATING ROOM | Age: 60
End: 2020-10-28
Payer: COMMERCIAL

## 2020-10-29 ENCOUNTER — HOSPITAL ENCOUNTER (OUTPATIENT)
Age: 60
Setting detail: OUTPATIENT SURGERY
Discharge: HOME | End: 2020-10-29
Attending: SURGERY | Admitting: SURGERY
Payer: COMMERCIAL

## 2020-10-29 ENCOUNTER — ANESTHESIA (OUTPATIENT)
Dept: OPERATING ROOM | Age: 60
End: 2020-10-29
Payer: COMMERCIAL

## 2020-10-29 VITALS
BODY MASS INDEX: 35.51 KG/M2 | OXYGEN SATURATION: 99 % | RESPIRATION RATE: 16 BRPM | HEART RATE: 75 BPM | DIASTOLIC BLOOD PRESSURE: 72 MMHG | SYSTOLIC BLOOD PRESSURE: 162 MMHG | WEIGHT: 193 LBS | HEIGHT: 62 IN | TEMPERATURE: 97.7 F

## 2020-10-29 VITALS — OXYGEN SATURATION: 92 % | SYSTOLIC BLOOD PRESSURE: 98 MMHG | DIASTOLIC BLOOD PRESSURE: 53 MMHG | TEMPERATURE: 98.6 F

## 2020-10-29 PROBLEM — D17.22 LIPOMA OF LEFT UPPER EXTREMITY: Status: ACTIVE | Noted: 2020-10-29

## 2020-10-29 LAB — GLUCOSE BLD-MCNC: 125 MG/DL (ref 74–100)

## 2020-10-29 PROCEDURE — 3700000000 HC ANESTHESIA ATTENDED CARE: Performed by: SURGERY

## 2020-10-29 PROCEDURE — 7100000010 HC PHASE II RECOVERY - FIRST 15 MIN: Performed by: SURGERY

## 2020-10-29 PROCEDURE — 2500000003 HC RX 250 WO HCPCS: Performed by: SURGERY

## 2020-10-29 PROCEDURE — 6360000002 HC RX W HCPCS: Performed by: SURGERY

## 2020-10-29 PROCEDURE — 24075 EXC ARM/ELBOW LES SC < 3 CM: CPT | Performed by: SURGERY

## 2020-10-29 PROCEDURE — 3700000001 HC ADD 15 MINUTES (ANESTHESIA): Performed by: SURGERY

## 2020-10-29 PROCEDURE — 25075 EXC FOREARM LES SC < 3 CM: CPT | Performed by: SURGERY

## 2020-10-29 PROCEDURE — 7100000011 HC PHASE II RECOVERY - ADDTL 15 MIN: Performed by: SURGERY

## 2020-10-29 PROCEDURE — 24076 EX ARM/ELBOW TUM DEEP < 5 CM: CPT | Performed by: SURGERY

## 2020-10-29 PROCEDURE — 2580000003 HC RX 258: Performed by: SURGERY

## 2020-10-29 PROCEDURE — 6360000002 HC RX W HCPCS: Performed by: NURSE ANESTHETIST, CERTIFIED REGISTERED

## 2020-10-29 PROCEDURE — 2709999900 HC NON-CHARGEABLE SUPPLY: Performed by: SURGERY

## 2020-10-29 PROCEDURE — 6370000000 HC RX 637 (ALT 250 FOR IP): Performed by: SURGERY

## 2020-10-29 PROCEDURE — 3600000002 HC SURGERY LEVEL 2 BASE: Performed by: SURGERY

## 2020-10-29 PROCEDURE — 3600000012 HC SURGERY LEVEL 2 ADDTL 15MIN: Performed by: SURGERY

## 2020-10-29 PROCEDURE — 88304 TISSUE EXAM BY PATHOLOGIST: CPT

## 2020-10-29 PROCEDURE — 82947 ASSAY GLUCOSE BLOOD QUANT: CPT

## 2020-10-29 RX ORDER — HYDROCODONE BITARTRATE AND ACETAMINOPHEN 5; 325 MG/1; MG/1
1 TABLET ORAL
Status: COMPLETED | OUTPATIENT
Start: 2020-10-29 | End: 2020-10-29

## 2020-10-29 RX ORDER — KETOROLAC TROMETHAMINE 30 MG/ML
INJECTION, SOLUTION INTRAMUSCULAR; INTRAVENOUS PRN
Status: DISCONTINUED | OUTPATIENT
Start: 2020-10-29 | End: 2020-10-29 | Stop reason: SDUPTHER

## 2020-10-29 RX ORDER — HYDROCODONE BITARTRATE AND ACETAMINOPHEN 5; 325 MG/1; MG/1
1 TABLET ORAL EVERY 6 HOURS PRN
Qty: 12 TABLET | Refills: 0 | Status: SHIPPED | OUTPATIENT
Start: 2020-10-29 | End: 2020-11-01

## 2020-10-29 RX ORDER — METOCLOPRAMIDE HYDROCHLORIDE 5 MG/ML
10 INJECTION INTRAMUSCULAR; INTRAVENOUS
Status: DISCONTINUED | OUTPATIENT
Start: 2020-10-29 | End: 2020-10-29 | Stop reason: HOSPADM

## 2020-10-29 RX ORDER — SODIUM CHLORIDE 0.9 % (FLUSH) 0.9 %
10 SYRINGE (ML) INJECTION EVERY 12 HOURS SCHEDULED
Status: DISCONTINUED | OUTPATIENT
Start: 2020-10-29 | End: 2020-10-29 | Stop reason: HOSPADM

## 2020-10-29 RX ORDER — ONDANSETRON 2 MG/ML
4 INJECTION INTRAMUSCULAR; INTRAVENOUS
Status: DISCONTINUED | OUTPATIENT
Start: 2020-10-29 | End: 2020-10-29 | Stop reason: HOSPADM

## 2020-10-29 RX ORDER — MEPERIDINE HYDROCHLORIDE 25 MG/ML
12.5 INJECTION INTRAMUSCULAR; INTRAVENOUS; SUBCUTANEOUS EVERY 5 MIN PRN
Status: DISCONTINUED | OUTPATIENT
Start: 2020-10-29 | End: 2020-10-29 | Stop reason: HOSPADM

## 2020-10-29 RX ORDER — DIMENHYDRINATE 50 MG/1
50 TABLET ORAL ONCE
Status: COMPLETED | OUTPATIENT
Start: 2020-10-29 | End: 2020-10-29

## 2020-10-29 RX ORDER — SODIUM CHLORIDE 0.9 % (FLUSH) 0.9 %
10 SYRINGE (ML) INJECTION PRN
Status: DISCONTINUED | OUTPATIENT
Start: 2020-10-29 | End: 2020-10-29 | Stop reason: HOSPADM

## 2020-10-29 RX ORDER — SODIUM CHLORIDE, SODIUM LACTATE, POTASSIUM CHLORIDE, CALCIUM CHLORIDE 600; 310; 30; 20 MG/100ML; MG/100ML; MG/100ML; MG/100ML
INJECTION, SOLUTION INTRAVENOUS CONTINUOUS
Status: DISCONTINUED | OUTPATIENT
Start: 2020-10-29 | End: 2020-10-29 | Stop reason: HOSPADM

## 2020-10-29 RX ORDER — PROPOFOL 10 MG/ML
INJECTION, EMULSION INTRAVENOUS PRN
Status: DISCONTINUED | OUTPATIENT
Start: 2020-10-29 | End: 2020-10-29 | Stop reason: SDUPTHER

## 2020-10-29 RX ORDER — PROPOFOL 10 MG/ML
INJECTION, EMULSION INTRAVENOUS CONTINUOUS PRN
Status: DISCONTINUED | OUTPATIENT
Start: 2020-10-29 | End: 2020-10-29 | Stop reason: SDUPTHER

## 2020-10-29 RX ORDER — FENTANYL CITRATE 50 UG/ML
50 INJECTION, SOLUTION INTRAMUSCULAR; INTRAVENOUS EVERY 5 MIN PRN
Status: DISCONTINUED | OUTPATIENT
Start: 2020-10-29 | End: 2020-10-29 | Stop reason: HOSPADM

## 2020-10-29 RX ORDER — ACETAMINOPHEN 325 MG/1
650 TABLET ORAL ONCE
Status: COMPLETED | OUTPATIENT
Start: 2020-10-29 | End: 2020-10-29

## 2020-10-29 RX ORDER — BUPIVACAINE HYDROCHLORIDE AND EPINEPHRINE 2.5; 5 MG/ML; UG/ML
INJECTION, SOLUTION EPIDURAL; INFILTRATION; INTRACAUDAL; PERINEURAL PRN
Status: DISCONTINUED | OUTPATIENT
Start: 2020-10-29 | End: 2020-10-29 | Stop reason: ALTCHOICE

## 2020-10-29 RX ORDER — FENTANYL CITRATE 50 UG/ML
INJECTION, SOLUTION INTRAMUSCULAR; INTRAVENOUS PRN
Status: DISCONTINUED | OUTPATIENT
Start: 2020-10-29 | End: 2020-10-29 | Stop reason: SDUPTHER

## 2020-10-29 RX ADMIN — PROPOFOL 150 MCG/KG/MIN: 10 INJECTION, EMULSION INTRAVENOUS at 11:25

## 2020-10-29 RX ADMIN — PHENYLEPHRINE HYDROCHLORIDE 100 MCG: 10 INJECTION INTRAVENOUS at 11:51

## 2020-10-29 RX ADMIN — FENTANYL CITRATE 100 MCG: 50 INJECTION, SOLUTION INTRAMUSCULAR; INTRAVENOUS at 11:26

## 2020-10-29 RX ADMIN — SODIUM CHLORIDE, POTASSIUM CHLORIDE, SODIUM LACTATE AND CALCIUM CHLORIDE: 600; 310; 30; 20 INJECTION, SOLUTION INTRAVENOUS at 10:01

## 2020-10-29 RX ADMIN — DEXTROSE MONOHYDRATE 2 G: 50 INJECTION, SOLUTION INTRAVENOUS at 11:20

## 2020-10-29 RX ADMIN — PROPOFOL 100 MG: 10 INJECTION, EMULSION INTRAVENOUS at 11:28

## 2020-10-29 RX ADMIN — KETOROLAC TROMETHAMINE 30 MG: 30 INJECTION, SOLUTION INTRAMUSCULAR; INTRAVENOUS at 12:40

## 2020-10-29 RX ADMIN — PHENYLEPHRINE HYDROCHLORIDE 100 MCG: 10 INJECTION INTRAVENOUS at 11:46

## 2020-10-29 RX ADMIN — HYDROCODONE BITARTRATE AND ACETAMINOPHEN 1 TABLET: 5; 325 TABLET ORAL at 13:06

## 2020-10-29 RX ADMIN — ACETAMINOPHEN 650 MG: 325 TABLET, FILM COATED ORAL at 09:55

## 2020-10-29 RX ADMIN — DIMENHYDRINATE 50 MG: 50 TABLET ORAL at 09:55

## 2020-10-29 RX ADMIN — PHENYLEPHRINE HYDROCHLORIDE 100 MCG: 10 INJECTION INTRAVENOUS at 11:38

## 2020-10-29 RX ADMIN — PHENYLEPHRINE HYDROCHLORIDE 200 MCG: 10 INJECTION INTRAVENOUS at 12:00

## 2020-10-29 ASSESSMENT — PAIN DESCRIPTION - DESCRIPTORS
DESCRIPTORS: PRESSURE
DESCRIPTORS: SORE

## 2020-10-29 ASSESSMENT — PAIN SCALES - GENERAL
PAINLEVEL_OUTOF10: 7
PAINLEVEL_OUTOF10: 5
PAINLEVEL_OUTOF10: 5

## 2020-10-29 ASSESSMENT — PAIN DESCRIPTION - PAIN TYPE
TYPE: SURGICAL PAIN

## 2020-10-29 ASSESSMENT — PAIN DESCRIPTION - ORIENTATION
ORIENTATION: LEFT;UPPER

## 2020-10-29 ASSESSMENT — PAIN DESCRIPTION - LOCATION
LOCATION: ARM

## 2020-10-29 ASSESSMENT — PAIN DESCRIPTION - PROGRESSION: CLINICAL_PROGRESSION: GRADUALLY IMPROVING

## 2020-10-29 ASSESSMENT — PAIN - FUNCTIONAL ASSESSMENT: PAIN_FUNCTIONAL_ASSESSMENT: 0-10

## 2020-10-29 NOTE — BRIEF OP NOTE
Brief Postoperative Note      Patient:  Serina Hernandez  YOB: 1960  MRN: [id number]   Cheryle Huh Julia, APRN - CNP      Date of Procedure: 10/29/2020    Pre-Op Diagnosis: symptomatic lipomas left upper extremity x 3 (left lateral wrist, left upper medial arm, left upper lateral arm)    Post-Op Diagnosis: Same       Procedure(s):  Simple excisional biopsy lipomas of left lateral wrist 1 cm, left upper medial arm 2.5 cm, and left upper lateral arm 2 cm    Surgeon(s):  Yanna Trejo DO    Anesthesia: General    Estimated Blood Loss (mL): less than 5 ml    Complications: None    Specimens:   Lipoma tissue in aggregate    Electronically signed by Yanna Trejo DO, FACOS, FACS on 10/29/2020 at 9:24 AM

## 2020-10-29 NOTE — PROGRESS NOTES
Discharge instructions given to patient and , Opal Lisa, verbalized understanding, no further questions at this time. Pt states readiness for discharge. Discharge Criteria    Inpatients must meet Criteria 1 through 7. All other patients are either YES or N/A. If a NO is chosen then Anesthesia or Surgeon must be notified. 1.  Minimum 30 minutes after last dose of sedative medication, minimum 120 minutes after last dose of reversal agent. Yes      2. Systolic BP stable within 20 mmHg for 30 minutes & systolic BP between 90 & 079 or within 10 mmHg of baseline. Yes      3. Pulse between 60 and 100 or within 10 bpm of baseline. Yes      4. Spontaneous respiratory rate >/= 10 per minute. Yes      5. SaO2 >/= 95 or  >/= baseline. Yes      6. Able to cough and swallow or return to baseline function. Yes      7. Alert and oriented or return to baseline mental status. Yes      8. Demonstrates controlled, coordinated movements, ambulates with steady gait, or return to baseline activity function. Yes      9. Minimal or no pain or nausea, or at a level tolerable and acceptable to patient. Yes      10. Takes and retains oral fluids as allowed. Yes      11. Procedural / perioperative site stable. Minimal or no bleeding. Yes          12. If GI endoscopy procedure, minimal or no abdominal distention or passing flatus. N/A      13. Written discharge instructions and emergency telephone number provided. Yes      14. Accompanied by a responsible adult.     Yes

## 2020-10-29 NOTE — ANESTHESIA PRE PROCEDURE
E78.5    Essential hypertension I10    Uncontrolled type 2 diabetes mellitus with hyperglycemia (HCC) E11.65    Positive FIT (fecal immunochemical test) R19.5    Acute nonintractable headache R51.9    Screening for malignant neoplasm of colon Z12.11       Past Medical History:        Diagnosis Date    Diabetes mellitus ([address]     Hepatitis A     Hyperlipidemia     Hypertension        Past Surgical History:        Procedure Laterality Date    APPENDECTOMY      CHOLECYSTECTOMY      COLONOSCOPY  10/23/2020    Dr. Nevaeh Coburn - polypectomy, hot biopsy    COLONOSCOPY N/A 10/23/2020    COLONOSCOPY POLYPECTOMY HOT BIOPSY performed by Kelsie Fonseca DO at [facility]  10/23/2020    COLONOSCOPY BIOPSY/STOMA performed by Kelsie Fonseca DO at [facility]         Social History:    Social History     Tobacco Use    Smoking status: Never Smoker    Smokeless tobacco: Never Used   Substance Use Topics    Alcohol use: No                                Counseling given: Not Answered      Vital Signs (Current):   Vitals:    10/29/20 0947   BP: (!) 168/89   Pulse: 79   Resp: 16   Temp: 36.4 °C (97.6 °F)   TempSrc: Temporal   SpO2: 99%   Weight: 193 lb (87.5 kg)   Height: 5' 2\" (1.575 m)                                              BP Readings from Last 3 Encounters:   10/29/20 (!) 168/89   10/23/20 (!) 89/34   10/23/20 136/60       NPO Status: Time of last liquid consumption: 1730                        Time of last solid consumption: 1730                        Date of last liquid consumption: 10/28/20                        Date of last solid food consumption: 10/28/20    BMI:   Wt Readings from Last 3 Encounters:   10/29/20 193 lb (87.5 kg)   10/23/20 193 lb (87.5 kg)   10/19/20 193 lb 6.4 oz (87.7 kg)     Body mass index is 35.3 kg/m².     CBC:   Lab Results   Component Value Date    WBC 7.6 08/03/2020    RBC 5.30 08/03/2020    HGB 14.6 08/03/2020    HCT 46.8 08/03/2020 MCV 88.3 08/03/2020    RDW 13.2 08/03/2020     08/03/2020       CMP:   Lab Results   Component Value Date     08/03/2020    K 4.2 08/03/2020     08/03/2020    CO2 25 08/03/2020    BUN 17 08/03/2020    CREATININE 0.82 08/03/2020    GFRAA >60 08/03/2020    LABGLOM >60 08/03/2020    GLUCOSE 243 08/03/2020    PROT 7.7 10/17/2020    CALCIUM 9.9 08/03/2020    BILITOT 0.45 10/17/2020    ALKPHOS 65 10/17/2020    AST 44 10/17/2020    ALT 55 10/17/2020       POC Tests:   Recent Labs     10/29/20  1002   POCGLU 125*       Coags: No results found for: PROTIME, INR, APTT    HCG (If Applicable): No results found for: PREGTESTUR, PREGSERUM, HCG, HCGQUANT     ABGs: No results found for: PHART, PO2ART, BLY7GID, TPZ9MQI, BEART, [id number]     Type & Screen (If Applicable):  No results found for: LABABO, LABRH    Drug/Infectious Status (If Applicable):  Lab Results   Component Value Date    HEPCAB NONREACTIVE 08/25/2016       COVID-19 Screening (If Applicable):   Lab Results   Component Value Date    COVID19 Not Detected 10/23/2020    COVID19 Not Detected 10/16/2020         Anesthesia Evaluation  Patient summary reviewed and Nursing notes reviewed  Airway: Mallampati: III        Dental:          Pulmonary:Negative Pulmonary ROS and normal exam  breath sounds clear to auscultation                             Cardiovascular:  Exercise tolerance: good (>4 METS),   (+) hypertension: moderate, hyperlipidemia        Rhythm: regular  Rate: normal                    Neuro/Psych:   Negative Neuro/Psych ROS     (-) headaches           GI/Hepatic/Renal:   (+) hepatitis: A,      (-) liver disease       Endo/Other:    (+) DiabetesType II DM, well controlled, , .                  ROS comment:  today Abdominal:           Vascular: negative vascular ROS. Anesthesia Plan      general and TIVA     ASA 3       Induction: intravenous.       Anesthetic plan and risks discussed with patient and spouse.                       Leona Bernal, GIOVANI - ZULEYMA   10/29/2020

## 2020-10-29 NOTE — H&P
TABS, Take 1 tablet by mouth daily, Disp: 30 tablet, Rfl: 0    verapamil (CALAN SR) 120 MG extended release tablet, Take 1 tablet by mouth daily, Disp: 90 tablet, Rfl: 0    labetalol (NORMODYNE) 100 MG tablet, Take 1 tablet by mouth 2 times daily, Disp: 60 tablet, Rfl: 1    ezetimibe (ZETIA) 10 MG tablet, Take 1 tablet by mouth daily, Disp: 90 tablet, Rfl: 1    Semaglutide (RYBELSUS) 7 MG TABS, Take by mouth, Disp: , Rfl:     nystatin (MYCOSTATIN) 659006 UNIT/GM powder, Apply 3 times daily as needed for recurrent skin infections (Patient not taking: Reported on 9/15/2020), Disp: 1 Bottle, Rfl: 5    Semaglutide (RYBELSUS) 14 MG TABS, Take 1 tablet by mouth daily (Patient not taking: Reported on 8/24/2020), Disp: 30 tablet, Rfl: 0         Physical Exam:  Vital signs and Nurse's note reviewed  Gen:  A&Ox3, NAD. Pleasant and cooperative. HEENT: PERRLA, EOMI, no scleral icterus  Neck:  no goiter  CVS: Regular rate and rhythm  Resp: Good bilateral air entry, no active wheezing, no labored breathing  Abd: soft, non-tender, non-distended, bowel sounds present. Ext: Moves all extremities, no gross focal motor deficits. Right posterior shoulder large lipoma that does NOT bother her. Bilateral wrist lipomas atop wrist bone that are painful and bothersome. Left upper arm 2 separate lipoma lesions lateral and medial, the medial one feel deeper, toward muscle, US image done on that one already, images reviewed.  Left posterior calf lipoma that does NOT bother her.   Skin: No erythema or ulcerations      Labs:             Lab Results   Component Value Date     WBC 7.6 08/03/2020     HGB 14.6 08/03/2020     HCT 46.8 08/03/2020     MCV 88.3 08/03/2020      08/03/2020                Lab Results   Component Value Date      08/03/2020     K 4.2 08/03/2020      08/03/2020     CO2 25 08/03/2020     BUN 17 08/03/2020     CREATININE 0.82 08/03/2020     GLUCOSE 243 08/03/2020     CALCIUM 9.9 08/03/2020              Lab Results   Component Value Date     ALKPHOS 57 01/09/2015     ALT 60 07/31/2020     AST 31 07/31/2020     PROT 5.8 01/09/2015     BILITOT 0.40 01/09/2015     LABALBU 4.3 01/27/2015      No results found for: LACTA            Lab Results   Component Value Date     AMYLASE 48 01/08/2015                Lab Results   Component Value Date     LIPASE 62 01/08/2015      No results found for: INR     Radiologic Studies:  EXAMINATION:    NONVASCULAR ULTRASOUND OF THE LEFT EXTREMITY         9/8/2020 12:49 pm         COMPARISON:    None.         HISTORY:    ORDERING SYSTEM PROVIDED HISTORY: Mass of left upper extremity    TECHNOLOGIST PROVIDED HISTORY:    left upper extremity mass about Antecubital fossa         FINDINGS:    Multiple grayscale and color Doppler images of the left antecubital fossa    were obtained.  Corresponding to the palpable lesion, there is a well-defined    isoechoic mass measuring 2.8 x 1.4 x 2.6 cm without internal vascularity    favoring a lipoma.              Impression     2.8 cm subcutaneous mass favoring a lipoma.             Impressions/Recommendations:      Symptomatic, painful, compressing lipomas she wants removed: left wrist (not ganglion cysts based on exam), left upper extremity medial and lateral lipomas above elbow. 3 lesions let UE     US image of medial upper left arm reviewed. Deeper, near muscle.     Plan excision UNDER GENERAL, need to inject local AFTER, not before, incision. Local first will make the lesions harder to isolate.      Right posterior shoulder large lipoma that does NOT bother her. US image done on that one already, images reviewed. Left posterior calf lipoma that does NOT bother her.       H&P  General Surgery        Pt Name: Nena Hawkins  MRN: [id number]  YOB: 1960  Date of evaluation: 10/29/2020      [x] I have examined the patient and reviewed the H&P/Consult completed, and there are no changes to the patient or plans.      [] I have examined the patient and reviewed the H&P/Consult and have noted the following changes: The patient was counseled at length about the risks of blanquita Covid-19 during their perioperative period and any recovery window from their procedure. The patient was made aware that blanquita Covid-19  may worsen their prognosis for recovering from their procedure  and lend to a higher morbidity and/or mortality risk. All material risks, benefits, and reasonable alternatives including postponing the procedure were discussed. The patient does wish to proceed with the procedure at this time.         Electronically signed by Brooklynn Looney DO  on 10/29/2020 at 11:19 AM

## 2020-11-02 LAB — SURGICAL PATHOLOGY REPORT: NORMAL

## 2020-11-02 NOTE — RESULT ENCOUNTER NOTE
Discussed results with patient in pre-op at the time of her lipoma surgery the following week. Placed in recall 1 year repeat colonoscopy.

## 2020-11-13 ENCOUNTER — OFFICE VISIT (OUTPATIENT)
Dept: SURGERY | Age: 60
End: 2020-11-13
Payer: COMMERCIAL

## 2020-11-13 VITALS
HEART RATE: 70 BPM | RESPIRATION RATE: 18 BRPM | WEIGHT: 195.6 LBS | HEIGHT: 62 IN | BODY MASS INDEX: 35.99 KG/M2 | DIASTOLIC BLOOD PRESSURE: 81 MMHG | SYSTOLIC BLOOD PRESSURE: 126 MMHG | TEMPERATURE: 97.5 F

## 2020-11-13 PROCEDURE — 99024 POSTOP FOLLOW-UP VISIT: CPT | Performed by: SURGERY

## 2020-11-13 NOTE — PROGRESS NOTES
11/13/20 postop check    Oli Thorpe had a few lipomas removed from her left upper extremity. She is healing well. The sutures area removed. I discussed the deeper submuscular lipoma at biceps region. She is healing well. Work papers filled out to allow for complete healing as she does a lot of heavy lifting. On a separate note, she wishes to see Gastroenterology for recommendations/opinion on IBS chronic symptoms that plague her. She has had a cholecystectomy in the past. She is diabetic. I did her colonoscopy recently and removed a very large tubular adenoma. The remainder of the colon was good in so far as no mucosal inflammation or bleeding. Dr. Parul West retired last month. She asked for recommendations. I recommended a telemedicine conference with [address] [place] [address] group [address] [place] [address] if she does not want to drive to [address] [place] [address] Way I know they do telemed meetings based on past referrals. She requests strongly the referral. Referral placed. Return 4 weeks for final check on biceps submuscular lipoma removal region. Increase fiber and hydration.

## 2020-11-17 ENCOUNTER — TELEPHONE (OUTPATIENT)
Dept: SURGERY | Age: 60
End: 2020-11-17

## 2020-11-17 NOTE — TELEPHONE ENCOUNTER
Referral was faxed to Dr. Wil Franco office with fax confirmation received. They will call patient to get her scheduled for an appt.

## 2020-11-21 ENCOUNTER — HOSPITAL ENCOUNTER (OUTPATIENT)
Age: 60
Setting detail: SPECIMEN
Discharge: HOME | End: 2020-11-21
Payer: COMMERCIAL

## 2020-11-21 LAB
-: NORMAL
REASON FOR REJECTION: NORMAL
ZZ NTE CLEAN UP: ORDERED TEST: NORMAL
ZZ NTE WITH NAME CLEAN UP: SPECIMEN SOURCE: NORMAL

## 2020-11-21 PROCEDURE — 87209 SMEAR COMPLEX STAIN: CPT

## 2020-11-21 PROCEDURE — 87210 SMEAR WET MOUNT SALINE/INK: CPT

## 2020-11-21 PROCEDURE — 83520 IMMUNOASSAY QUANT NOS NONAB: CPT

## 2020-11-21 PROCEDURE — 87015 SPECIMEN INFECT AGNT CONCNTJ: CPT

## 2020-11-21 PROCEDURE — 87506 IADNA-DNA/RNA PROBE TQ 6-11: CPT

## 2020-11-22 PROBLEM — Z12.11 SCREENING FOR MALIGNANT NEOPLASM OF COLON: Status: RESOLVED | Noted: 2020-10-23 | Resolved: 2020-11-22

## 2020-11-23 LAB
Lab: NORMAL
MICRO OVA & PARASITES: NORMAL
SPECIMEN DESCRIPTION: NORMAL

## 2020-11-24 LAB — FECAL PANCREATIC ELASTASE-1: 523 UG/G

## 2020-11-30 LAB
MISCELLANEOUS LAB TEST RESULT: NORMAL
TEST NAME: NORMAL

## 2020-12-11 ENCOUNTER — OFFICE VISIT (OUTPATIENT)
Dept: SURGERY | Age: 60
End: 2020-12-11
Payer: COMMERCIAL

## 2020-12-11 VITALS
TEMPERATURE: 98.2 F | SYSTOLIC BLOOD PRESSURE: 139 MMHG | DIASTOLIC BLOOD PRESSURE: 80 MMHG | HEART RATE: 64 BPM | HEIGHT: 62 IN | WEIGHT: 195 LBS | BODY MASS INDEX: 35.88 KG/M2

## 2020-12-11 PROCEDURE — 99024 POSTOP FOLLOW-UP VISIT: CPT | Performed by: SURGERY

## 2021-01-12 ENCOUNTER — TELEPHONE (OUTPATIENT)
Dept: PRIMARY CARE CLINIC | Age: 61
End: 2021-01-12

## 2021-01-12 NOTE — TELEPHONE ENCOUNTER
Okay to be evaluated by Catrachita If she would like to make an office visit at some point that is fine as well.

## 2021-01-12 NOTE — TELEPHONE ENCOUNTER
Pt. Calls stating since lipoma removal on lower L arm, she is now having some L shoulder, nerve-like pain. Pt. Is asking if you would want to evaluate her or is it okay she be evaluated by Tony Bustillos? She is just wanting to ask you first on which you prefer she be evaluated by. Please advise, thank you.

## 2021-02-03 ENCOUNTER — OFFICE VISIT (OUTPATIENT)
Dept: PRIMARY CARE CLINIC | Age: 61
End: 2021-02-03
Payer: COMMERCIAL

## 2021-02-03 VITALS
HEIGHT: 62 IN | WEIGHT: 190.2 LBS | TEMPERATURE: 97.5 F | SYSTOLIC BLOOD PRESSURE: 136 MMHG | BODY MASS INDEX: 35 KG/M2 | DIASTOLIC BLOOD PRESSURE: 82 MMHG | HEART RATE: 74 BPM | RESPIRATION RATE: 20 BRPM | OXYGEN SATURATION: 98 %

## 2021-02-03 DIAGNOSIS — I10 ESSENTIAL HYPERTENSION: ICD-10-CM

## 2021-02-03 DIAGNOSIS — E11.9 CONTROLLED TYPE 2 DIABETES MELLITUS WITHOUT COMPLICATION, WITHOUT LONG-TERM CURRENT USE OF INSULIN (HCC): Primary | ICD-10-CM

## 2021-02-03 DIAGNOSIS — K58.0 IRRITABLE BOWEL SYNDROME WITH DIARRHEA: ICD-10-CM

## 2021-02-03 DIAGNOSIS — M77.8 TENDINITIS OF LEFT SHOULDER: ICD-10-CM

## 2021-02-03 LAB — HBA1C MFR BLD: 6.5 %

## 2021-02-03 PROCEDURE — 83036 HEMOGLOBIN GLYCOSYLATED A1C: CPT | Performed by: NURSE PRACTITIONER

## 2021-02-03 PROCEDURE — 99214 OFFICE O/P EST MOD 30 MIN: CPT | Performed by: NURSE PRACTITIONER

## 2021-02-03 RX ORDER — PANTOPRAZOLE SODIUM 40 MG/1
TABLET, DELAYED RELEASE ORAL
COMMUNITY
Start: 2020-12-18 | End: 2021-09-20 | Stop reason: SDUPTHER

## 2021-02-03 ASSESSMENT — ENCOUNTER SYMPTOMS
WHEEZING: 0
VOMITING: 0
NAUSEA: 0
SHORTNESS OF BREATH: 0
VISUAL CHANGE: 0
ABDOMINAL PAIN: 0
COUGH: 0
SORE THROAT: 0
RHINORRHEA: 0
CONSTIPATION: 0
DIARRHEA: 1

## 2021-02-03 ASSESSMENT — PATIENT HEALTH QUESTIONNAIRE - PHQ9
2. FEELING DOWN, DEPRESSED OR HOPELESS: 0
SUM OF ALL RESPONSES TO PHQ9 QUESTIONS 1 & 2: 0

## 2021-02-03 NOTE — PROGRESS NOTES
Name: Kira Mcneal  : 1960         Chief Complaint:     Chief Complaint   Patient presents with    Diabetes     routine check    Hypertension       History of Present Illness: Kira Mcneal is a 63 y.o.  female who presents with Diabetes (routine check) and Hypertension      Boykin Spurling is here today for routine office visit. DMmuch improved, patient congratulated. See below for further comment. IBS with diarrheapatient following with gastroenterology. States she has been placed on a probiotic and has a follow-up visit next month. Still having issues. Left shoulder tendinitisstable, waxing and waning pain. Patient is following with physical therapy at present. Currently unable to lift above her head. Diabetes  She presents for her follow-up diabetic visit. She has type 2 diabetes mellitus. Her disease course has been improving. There are no hypoglycemic associated symptoms. Pertinent negatives for hypoglycemia include no dizziness, headaches, mood changes, nervousness/anxiousness, sleepiness, speech difficulty or sweats. There are no diabetic associated symptoms. Pertinent negatives for diabetes include no chest pain, no fatigue, no polydipsia, no polyphagia, no polyuria and no visual change. There are no hypoglycemic complications. Pertinent negatives for hypoglycemia complications include no hospitalization and no nocturnal hypoglycemia. Symptoms are stable. There are no diabetic complications. Pertinent negatives for diabetic complications include no CVA, heart disease, nephropathy or peripheral neuropathy. Risk factors for coronary artery disease include diabetes mellitus, dyslipidemia, family history, obesity, hypertension, stress and post-menopausal. Current diabetic treatment includes oral agent (triple therapy). She is compliant with treatment all of the time. Her weight is stable. She is following a generally healthy diet.  Meal planning includes avoidance of concentrated sweets. She has had a previous visit with a dietitian. She participates in exercise intermittently. Her home blood glucose trend is decreasing steadily. Her breakfast blood glucose range is generally 130-140 mg/dl. An ACE inhibitor/angiotensin II receptor blocker is being taken. She does not see a podiatrist.Eye exam is current. Hypertension  This is a chronic problem. The current episode started more than 1 year ago. The problem is unchanged. The problem is controlled. Pertinent negatives include no chest pain, headaches, neck pain, palpitations, peripheral edema, shortness of breath or sweats. There are no associated agents to hypertension. Risk factors for coronary artery disease include diabetes mellitus, dyslipidemia, family history, obesity, post-menopausal state and stress. Past treatments include ACE inhibitors, calcium channel blockers and beta blockers. The current treatment provides moderate improvement. There are no compliance problems. There is no history of kidney disease, CAD/MI, CVA or heart failure. There is no history of chronic renal disease. Past Medical History:     Past Medical History:   Diagnosis Date    Diabetes mellitus ([facility] [address]     Hepatitis A     Hyperlipidemia     Hypertension       Reviewed all health maintenance requirements and ordered appropriate tests  There are no preventive care reminders to display for this patient.     Past Surgical History:     Past Surgical History:   Procedure Laterality Date    APPENDECTOMY      ARM SURGERY Left 10/29/2020    lesion biopsy excision multiple lipomas    ARM SURGERY Bilateral 10/29/2020    ARM LESION BIOPSY EXCISION, MULTIPLE LIPOMAS performed by Christian Roque DO at [place] [address]  10/23/2020    Dr. Gunjan Jain - polypectomy, hot biopsy    COLONOSCOPY N/A 10/23/2020    COLONOSCOPY POLYPECTOMY HOT BIOPSY performed by Christian Roque DO at [address] [place] [address]  10/23/2020    COLONOSCOPY BIOPSY/STOMA performed by Tsering Perez DO at [address] [facility] ENDOSCOPY  12/04/2020    Vasquez Allison        Medications:       Prior to Admission medications    Medication Sig Start Date End Date Taking? Authorizing Provider   pantoprazole (PROTONIX) 40 MG tablet TAKE 1 TABLET BY MOUTH ONCE DAILY FOR 30 DAYS 12/18/20  Yes GIOVANI Barbosa CNP   ezetimibe (ZETIA) 10 MG tablet Take 1 tablet by mouth once daily 1/6/21  Yes GIOVANI Lino CNP   Semaglutide (RYBELSUS) 7 MG TABS Take 7 mg by mouth daily 10/19/20  Yes GIOVANI Lino CNP   pioglitazone (ACTOS) 30 MG tablet Take 1 tablet by mouth daily 10/19/20  Yes GIOVANI Lino CNP   verapamil (CALAN SR) 120 MG extended release tablet Take 1 tablet by mouth once daily 10/6/20  Yes GIOVANI Lino CNP   labetalol (NORMODYNE) 100 MG tablet Take 1 tablet by mouth 2 times daily 10/6/20  Yes GIOVANI Lino CNP   lisinopril (PRINIVIL;ZESTRIL) 20 MG tablet Take 1 tablet by mouth daily 9/15/20  Yes GIOVANI Painter CNP        Allergies:       Statins    Social History:     Tobacco:    reports that she has never smoked. She has never used smokeless tobacco.  Alcohol:      reports no history of alcohol use. Drug Use:  reports no history of drug use. Family History:     Family History   Problem Relation Age of Onset    Diabetes Father        Review of Systems:     Positive and Negative as described in HPI    Review of Systems   Constitutional: Negative for chills, fatigue and fever. HENT: Negative for congestion, rhinorrhea and sore throat. Eyes: Negative for visual disturbance. Respiratory: Negative for cough, shortness of breath and wheezing. Cardiovascular: Negative for chest pain and palpitations. Gastrointestinal: Positive for diarrhea. Negative for abdominal pain, constipation, nausea and vomiting.    Endocrine: Negative for polydipsia, polyphagia and polyuria. Genitourinary: Negative for difficulty urinating and dysuria. Musculoskeletal: Positive for arthralgias. Negative for gait problem, neck pain and neck stiffness. Skin: Negative for rash. Neurological: Negative for dizziness, syncope, speech difficulty, light-headedness and headaches. Psychiatric/Behavioral: The patient is not nervous/anxious. Physical Exam:   Vitals:  /82 (Site: Left Upper Arm)   Pulse 74   Temp 97.5 °F (36.4 °C) (Temporal)   Resp 20   Ht 5' 2\" (1.575 m)   Wt 190 lb 3.2 oz (86.3 kg)   SpO2 98%   BMI 34.79 kg/m²     Physical Exam  Vitals signs and nursing note reviewed. Constitutional:       General: She is not in acute distress. Appearance: Normal appearance. She is well-developed. She is obese. She is not ill-appearing. HENT:      Mouth/Throat:      Mouth: Mucous membranes are moist.   Eyes:      Conjunctiva/sclera: Conjunctivae normal.   Neck:      Musculoskeletal: Normal range of motion and neck supple. Cardiovascular:      Rate and Rhythm: Normal rate and regular rhythm. Heart sounds: No murmur. Pulmonary:      Effort: Pulmonary effort is normal.      Breath sounds: Normal breath sounds. No wheezing. Abdominal:      General: Bowel sounds are normal. There is no distension. Palpations: Abdomen is soft. Tenderness: There is no abdominal tenderness. Musculoskeletal:         General: Tenderness present. Left shoulder: She exhibits decreased range of motion and tenderness. Right lower leg: No edema. Left lower leg: No edema. Lymphadenopathy:      Cervical: No cervical adenopathy. Skin:     General: Skin is warm and dry. Findings: No rash. Neurological:      Mental Status: She is alert and oriented to person, place, and time.    Psychiatric:         Mood and Affect: Mood normal.         Behavior: Behavior normal.         Data:     Lab Results   Component Value Date     08/03/2020    K 4.2 08/03/2020     08/03/2020    CO2 25 08/03/2020    BUN 17 08/03/2020    CREATININE 0.82 08/03/2020    GLUCOSE 243 08/03/2020    PROT 7.7 10/17/2020    LABALBU 4.6 10/17/2020    BILITOT 0.45 10/17/2020    ALKPHOS 65 10/17/2020    AST 44 10/17/2020    ALT 55 10/17/2020     Lab Results   Component Value Date    WBC 7.6 08/03/2020    RBC 5.30 08/03/2020    HGB 14.6 08/03/2020    HCT 46.8 08/03/2020    MCV 88.3 08/03/2020    MCH 27.5 08/03/2020    MCHC 31.2 08/03/2020    RDW 13.2 08/03/2020     08/03/2020    MPV 9.9 08/03/2020     Lab Results   Component Value Date    TSH 1.78 07/18/2016     Lab Results   Component Value Date    CHOL 218 10/17/2020    CHOL 271 11/10/2017    HDL 43 10/17/2020    LABA1C 6.5 02/03/2021       Assessment/Plan:      Diagnosis Orders   1. Controlled type 2 diabetes mellitus without complication, without long-term current use of insulin (HCC)  POCT glycosylated hemoglobin (Hb A1C)    CBC Auto Differential    ALT    AST    Basic Metabolic Panel    Hemoglobin A1C    Lipid Panel    Microalbumin, Ur   2. Essential hypertension     3. Irritable bowel syndrome with diarrhea     4. Tendinitis of left shoulder       Continue current medications and specialist follow-up. Labs in 6 months. 1.  Wendy received counseling on the following healthy behaviors: nutrition, exercise and medication adherence  2. Patient given educational materials - see patient instructions  3. Was a self-tracking handout given in paper form or via [employer]? No  If yes, see orders or list here. 4.  Discussed use, benefit, and side effects of prescribed medications. Barriers to medication compliance addressed. All patient questions answered. Pt voiced understanding. 5.  Reviewed prior labs and health maintenance  6. Continue current medications, diet and exercise.     Completed Refills   Requested Prescriptions      No prescriptions requested or ordered in this encounter         Return in about 6 months (around 8/3/2021) for Check up.

## 2021-02-03 NOTE — PATIENT INSTRUCTIONS
SURVEY:    You may be receiving a survey from [facility] regarding your visit today. Please complete the survey to enable us to provide the highest quality of care to you and your family. If you cannot score us a very good on any question, please call the office to discuss how we could have made your experience a very good one. Thank you. Patient Education        Counting Carbohydrates: Care Instructions  Your Care Instructions     You don't have to eat special foods when you have diabetes. You just have to be careful to eat healthy foods. Carbohydrates (carbs) raise blood sugar higher and quicker than any other nutrient. Carbs are found in desserts, breads and cereals, and fruit. They're also in starchy vegetables. These include potatoes, corn, and grains such as rice and pasta. Carbs are also in milk and yogurt. The more carbs you eat at one time, the higher your blood sugar will rise. Spreading carbs all through the day helps keep your blood sugar levels within your target range. Counting carbs is one of the best ways to keep your blood sugar under control. If you use insulin, counting carbs helps you match the right amount of insulin to the number of grams of carbs in a meal. Then you can change your diet and insulin dose as needed. Testing your blood sugar several times a day can help you learn how carbs affect your blood sugar. A registered dietitian or certified diabetes educator can help you plan meals and snacks. Follow-up care is a key part of your treatment and safety. Be sure to make and go to all appointments, and call your doctor if you are having problems. It's also a good idea to know your test results and keep a list of the medicines you take. How can you care for yourself at home?   Know your daily amount of carbohydrates  Your daily amount depends on several things, such as your weight, how active you are, which diabetes medicines you take, and what your goals are for your more slowly than it would otherwise. Eat from all food groups  · Eat at least three meals a day. · Plan meals to include food from all the food groups. The food groups include grains, fruits, dairy, proteins, and vegetables. · Talk to your dietitian or diabetes educator about ways to add limited amounts of sweets into your meal plan. · If you drink alcohol, talk to your doctor. It may not be recommended when you are taking certain diabetes medicines. Where can you learn more? Go to https://Media [email or web address].Hobobe. org and sign in to your [employer] account. Enter P856 in the [facility] box to learn more about \"Counting Carbohydrates: Care Instructions. \"     If you do not have an account, please click on the \"Sign Up Now\" link. Current as of: December 20, 2019               Content Version: 12.6  © 8955-9751 [employer]. Care instructions adapted under license by [facility] [address] If you have questions about a medical condition or this instruction, always ask your healthcare professional. [place] [address] any warranty or liability for your use of this information.

## 2021-03-08 ENCOUNTER — TELEPHONE (OUTPATIENT)
Dept: PRIMARY CARE CLINIC | Age: 61
End: 2021-03-08

## 2021-03-08 ENCOUNTER — HOSPITAL ENCOUNTER (OUTPATIENT)
Dept: GENERAL RADIOLOGY | Age: 61
Discharge: HOME | End: 2021-03-10
Payer: COMMERCIAL

## 2021-03-08 ENCOUNTER — OFFICE VISIT (OUTPATIENT)
Dept: PRIMARY CARE CLINIC | Age: 61
End: 2021-03-08
Payer: COMMERCIAL

## 2021-03-08 ENCOUNTER — HOSPITAL ENCOUNTER (OUTPATIENT)
Age: 61
Discharge: HOME | End: 2021-03-10
Payer: COMMERCIAL

## 2021-03-08 VITALS
SYSTOLIC BLOOD PRESSURE: 130 MMHG | RESPIRATION RATE: 20 BRPM | HEART RATE: 70 BPM | BODY MASS INDEX: 34.44 KG/M2 | WEIGHT: 188.3 LBS | TEMPERATURE: 97.8 F | OXYGEN SATURATION: 99 % | DIASTOLIC BLOOD PRESSURE: 82 MMHG

## 2021-03-08 DIAGNOSIS — M75.42 IMPINGEMENT SYNDROME OF LEFT SHOULDER: ICD-10-CM

## 2021-03-08 DIAGNOSIS — M75.42 IMPINGEMENT SYNDROME OF LEFT SHOULDER: Primary | ICD-10-CM

## 2021-03-08 PROCEDURE — 73030 X-RAY EXAM OF SHOULDER: CPT

## 2021-03-08 PROCEDURE — 99214 OFFICE O/P EST MOD 30 MIN: CPT | Performed by: NURSE PRACTITIONER

## 2021-03-08 PROCEDURE — 72040 X-RAY EXAM NECK SPINE 2-3 VW: CPT

## 2021-03-08 RX ORDER — DICLOFENAC SODIUM 75 MG/1
75 TABLET, DELAYED RELEASE ORAL 2 TIMES DAILY
Qty: 30 TABLET | Refills: 0 | Status: SHIPPED | OUTPATIENT
Start: 2021-03-08 | End: 2021-05-25 | Stop reason: ALTCHOICE

## 2021-03-08 ASSESSMENT — ENCOUNTER SYMPTOMS
BACK PAIN: 0
CONSTIPATION: 0
ABDOMINAL PAIN: 0
COUGH: 0
SORE THROAT: 0
VOMITING: 0
SHORTNESS OF BREATH: 0
NAUSEA: 0
WHEEZING: 0
RHINORRHEA: 0
DIARRHEA: 0

## 2021-03-08 NOTE — PATIENT INSTRUCTIONS
SURVEY:    You may be receiving a survey from [facility] regarding your visit today. Please complete the survey to enable us to provide the highest quality of care to you and your family. If you cannot score us a very good on any question, please call the office to discuss how we could have made your experience a very good one. Thank you.

## 2021-03-08 NOTE — PROGRESS NOTES
Name: Ambrosio Zamora  : 1960         Chief Complaint:     Chief Complaint   Patient presents with    Shoulder Pain     left shoulder, not any better       History of Present Illness: Ambrosio Zamora is a 63 y.o.  female who presents with Shoulder Pain (left shoulder, not any better)      Nicola Zambrano is here today for routine office visit. Unfortunately she continues to have left shoulder pain. She has completed all but 2 physical therapy sessions and was advised by her physical therapist to come to the office for reevaluation. She has not progressed as expected. She continues to have decreased range of motion and pain. She states the physical therapist is questioning whether or not she needs a \"cortisone injection\". See below for further comments. Shoulder Pain   The pain is present in the left shoulder. This is a recurrent problem. The current episode started more than 1 month ago. There has been no history of extremity trauma. The problem occurs daily. The problem has been unchanged. The quality of the pain is described as aching and sharp. The pain is at a severity of 5/10. The pain is moderate. Associated symptoms include a limited range of motion and stiffness. Pertinent negatives include no fever, inability to bear weight, itching, joint locking, joint swelling, numbness or tingling. The symptoms are aggravated by activity. She has tried rest, heat, cold and movement (Physical therapy) for the symptoms. The treatment provided mild relief. Family history does not include rheumatoid arthritis. Her past medical history is significant for diabetes and osteoarthritis. There is no history of gout or rheumatoid arthritis.          Past Medical History:     Past Medical History:   Diagnosis Date    Diabetes mellitus ([address]     Hepatitis A     Hyperlipidemia     Hypertension       Reviewed all health maintenance requirements and ordered appropriate tests  There are no preventive care reminders to display for this patient. Past Surgical History:     Past Surgical History:   Procedure Laterality Date    APPENDECTOMY      ARM SURGERY Left 10/29/2020    lesion biopsy excision multiple lipomas    ARM SURGERY Bilateral 10/29/2020    ARM LESION BIOPSY EXCISION, MULTIPLE LIPOMAS performed by Michelle Herrmann DO at [address]  10/23/2020    Dr. Daren Reid - polypectomy, hot biopsy    COLONOSCOPY N/A 10/23/2020    COLONOSCOPY POLYPECTOMY HOT BIOPSY performed by Michelle Herrmann DO at [facility]  10/23/2020    COLONOSCOPY BIOPSY/STOMA performed by Michelle Herrmann DO at [address] [facility] ENDOSCOPY  12/04/2020    [facility]-Dr. Tristan Board        Medications:       Prior to Admission medications    Medication Sig Start Date End Date Taking? Authorizing Provider   diclofenac (VOLTAREN) 75 MG EC tablet Take 1 tablet by mouth 2 times daily 3/8/21  Yes Frutoso Estonian Might, APRN - CNP   ezetimibe (ZETIA) 10 MG tablet Take 1 tablet by mouth once daily 1/6/21  Yes Frutoso Estonian Might, APRN - CNP   Semaglutide (RYBELSUS) 7 MG TABS Take 7 mg by mouth daily 10/19/20  Yes Frutoso Estonian Might, APRN - CNP   pioglitazone (ACTOS) 30 MG tablet Take 1 tablet by mouth daily 10/19/20  Yes Frutoso Estonian Might, APRN - CNP   verapamil (CALAN SR) 120 MG extended release tablet Take 1 tablet by mouth once daily 10/6/20  Yes Frutoso Estonian Might, APRN - CNP   labetalol (NORMODYNE) 100 MG tablet Take 1 tablet by mouth 2 times daily 10/6/20  Yes Frutoso Estonian Might, APRN - CNP   lisinopril (PRINIVIL;ZESTRIL) 20 MG tablet Take 1 tablet by mouth daily 9/15/20  Yes Virgilio Song, GIOVANI Means CNP   pantoprazole (PROTONIX) 40 MG tablet TAKE 1 TABLET BY MOUTH ONCE DAILY FOR 30 DAYS 12/18/20   GIOVANI Perez CNP        Allergies:       Statins    Social History:     Tobacco:    reports that she has never smoked.  She has never used smokeless tobacco.  Alcohol:      reports no history of alcohol use. Drug Use:  reports no history of drug use. Family History:     Family History   Problem Relation Age of Onset    Diabetes Father        Review of Systems:     Positive and Negative as described in HPI    Review of Systems   Constitutional: Negative for chills, fatigue and fever. HENT: Negative for congestion, rhinorrhea and sore throat. Eyes: Negative for visual disturbance. Respiratory: Negative for cough, shortness of breath and wheezing. Cardiovascular: Negative for chest pain and palpitations. Gastrointestinal: Negative for abdominal pain, constipation, diarrhea, nausea and vomiting. Genitourinary: Negative for difficulty urinating and dysuria. Musculoskeletal: Positive for arthralgias and stiffness. Negative for back pain, gait problem, gout, joint swelling, myalgias, neck pain and neck stiffness. Skin: Negative for itching and rash. Neurological: Negative for dizziness, tingling, syncope, light-headedness, numbness and headaches. Physical Exam:   Vitals:  /82 (Site: Right Upper Arm)   Pulse 70   Temp 97.8 °F (36.6 °C) (Temporal)   Resp 20   Wt 188 lb 4.8 oz (85.4 kg)   SpO2 99%   BMI 34.44 kg/m²     Physical Exam  Vitals signs and nursing note reviewed. Constitutional:       General: She is not in acute distress. Appearance: Normal appearance. She is well-developed. She is obese. She is not ill-appearing. HENT:      Mouth/Throat:      Mouth: Mucous membranes are moist.   Eyes:      Conjunctiva/sclera: Conjunctivae normal.   Neck:      Musculoskeletal: Normal range of motion and neck supple. Muscular tenderness present. No neck rigidity or spinous process tenderness. Cardiovascular:      Rate and Rhythm: Normal rate and regular rhythm. Heart sounds: No murmur. Pulmonary:      Effort: Pulmonary effort is normal.      Breath sounds: Normal breath sounds. No wheezing.    Abdominal:      General: Bowel sounds are normal. There is no distension. Palpations: Abdomen is soft. Tenderness: There is no abdominal tenderness. Musculoskeletal:         General: Tenderness present. Left shoulder: She exhibits decreased range of motion, tenderness and pain. She exhibits no bony tenderness, no swelling, no effusion, no crepitus, no deformity, no laceration, no spasm, normal pulse and normal strength. Right lower leg: No edema. Left lower leg: No edema. Comments: Left shoulder limited flexion and extension. Lymphadenopathy:      Cervical: No cervical adenopathy. Skin:     General: Skin is warm and dry. Findings: No rash. Neurological:      Mental Status: She is alert and oriented to person, place, and time. Psychiatric:         Mood and Affect: Mood normal.         Behavior: Behavior normal.         Data:     Lab Results   Component Value Date     08/03/2020    K 4.2 08/03/2020     08/03/2020    CO2 25 08/03/2020    BUN 17 08/03/2020    CREATININE 0.82 08/03/2020    GLUCOSE 243 08/03/2020    PROT 7.7 10/17/2020    LABALBU 4.6 10/17/2020    BILITOT 0.45 10/17/2020    ALKPHOS 65 10/17/2020    AST 44 10/17/2020    ALT 55 10/17/2020     Lab Results   Component Value Date    WBC 7.6 08/03/2020    RBC 5.30 08/03/2020    HGB 14.6 08/03/2020    HCT 46.8 08/03/2020    MCV 88.3 08/03/2020    MCH 27.5 08/03/2020    MCHC 31.2 08/03/2020    RDW 13.2 08/03/2020     08/03/2020    MPV 9.9 08/03/2020     Lab Results   Component Value Date    TSH 1.78 07/18/2016     Lab Results   Component Value Date    CHOL 218 10/17/2020    CHOL 271 11/10/2017    HDL 43 10/17/2020    LABA1C 6.5 02/03/2021       Assessment/Plan:      Diagnosis Orders   1. Impingement syndrome of left shoulder  XR SHOULDER LEFT (MIN 2 VIEWS)    XR CERVICAL SPINE (2-3 VIEWS)    diclofenac (VOLTAREN) 75 MG EC tablet    External Referral To Orthopedic Surgery     We will obtain x-rays of the left shoulder and cervical spine.   Diclofenac 2 times daily as needed for pain. Hold on physical therapy for the moment. We will refer to orthopedics for evaluation. 1.  Wendy received counseling on the following healthy behaviors: nutrition, exercise and medication adherence  2. Patient given educational materials - see patient instructions  3. Was a self-tracking handout given in paper form or via [employer]? No  If yes, see orders or list here. 4.  Discussed use, benefit, and side effects of prescribed medications. Barriers to medication compliance addressed. All patient questions answered. Pt voiced understanding. 5.  Reviewed prior labs and health maintenance  6. Continue current medications, diet and exercise. Completed Refills   Requested Prescriptions     Signed Prescriptions Disp Refills    diclofenac (VOLTAREN) 75 MG EC tablet 30 tablet 0     Sig: Take 1 tablet by mouth 2 times daily         Return if symptoms worsen or fail to improve.

## 2021-03-08 NOTE — TELEPHONE ENCOUNTER
Called patient and informed her that her xray showed nothing acute and there is some mild age related changes in the left shoulder and that she should f/u with orthopedics as planned.

## 2021-03-08 NOTE — TELEPHONE ENCOUNTER
----- Message from GIOVANI Castro CNP sent at 3/8/2021  1:41 PM EST -----  X-ray showed nothing acute. There is some mild age-related changes in the left shoulder. Follow-up with orthopedics as planned.

## 2021-03-18 ENCOUNTER — HOSPITAL ENCOUNTER (OUTPATIENT)
Age: 61
Discharge: HOME | End: 2021-03-18
Payer: COMMERCIAL

## 2021-03-18 LAB
EKG ATRIAL RATE: 68 BPM
EKG P AXIS: 59 DEGREES
EKG P-R INTERVAL: 140 MS
EKG Q-T INTERVAL: 416 MS
EKG QRS DURATION: 80 MS
EKG QTC CALCULATION (BAZETT): 442 MS
EKG R AXIS: 75 DEGREES
EKG T AXIS: 60 DEGREES
EKG VENTRICULAR RATE: 68 BPM

## 2021-03-18 PROCEDURE — 93005 ELECTROCARDIOGRAM TRACING: CPT | Performed by: ORTHOPAEDIC SURGERY

## 2021-03-18 PROCEDURE — 93010 ELECTROCARDIOGRAM REPORT: CPT | Performed by: INTERNAL MEDICINE

## 2021-03-19 NOTE — PROGRESS NOTES
Patient instructed on the pre-operative, intra-operative, and post-operative process. Patient's surgical procedure and day of surgery confirmed. Patient instructed on NPO status. Medication instructions reviewed with patient. CHG pre-operative wash instructions reviewed with patient. Pre operative instruction sheet reviewed with patient per PAT phone interview. Appointment time and instructions for pre-op COVID testing given to patient. Patient instructed to only take labetalol and pantoprozole with small sip of water the morning of surgery.

## 2021-03-22 ENCOUNTER — HOSPITAL ENCOUNTER (OUTPATIENT)
Dept: PREADMISSION TESTING | Age: 61
Setting detail: SPECIMEN
Discharge: HOME | End: 2021-03-26
Payer: COMMERCIAL

## 2021-03-22 DIAGNOSIS — Z01.818 PREOPERATIVE TESTING: Primary | ICD-10-CM

## 2021-03-22 DIAGNOSIS — Z01.818 PREOPERATIVE TESTING: ICD-10-CM

## 2021-03-22 PROCEDURE — U0005 INFEC AGEN DETEC AMPLI PROBE: HCPCS

## 2021-03-22 PROCEDURE — U0003 INFECTIOUS AGENT DETECTION BY NUCLEIC ACID (DNA OR RNA); SEVERE ACUTE RESPIRATORY SYNDROME CORONAVIRUS 2 (SARS-COV-2) (CORONAVIRUS DISEASE [COVID-19]), AMPLIFIED PROBE TECHNIQUE, MAKING USE OF HIGH THROUGHPUT TECHNOLOGIES AS DESCRIBED BY CMS-2020-01-R: HCPCS

## 2021-03-22 PROCEDURE — C9803 HOPD COVID-19 SPEC COLLECT: HCPCS

## 2021-03-23 LAB
SARS-COV-2: NORMAL
SARS-COV-2: NOT DETECTED
SOURCE: NORMAL

## 2021-03-24 ENCOUNTER — TELEPHONE (OUTPATIENT)
Dept: PRIMARY CARE CLINIC | Age: 61
End: 2021-03-24

## 2021-03-26 ENCOUNTER — ANESTHESIA EVENT (OUTPATIENT)
Dept: OPERATING ROOM | Age: 61
End: 2021-03-26
Payer: COMMERCIAL

## 2021-03-29 ENCOUNTER — HOSPITAL ENCOUNTER (OUTPATIENT)
Age: 61
Setting detail: OUTPATIENT SURGERY
Discharge: HOME | End: 2021-03-29
Attending: ORTHOPAEDIC SURGERY | Admitting: ORTHOPAEDIC SURGERY
Payer: COMMERCIAL

## 2021-03-29 ENCOUNTER — ANESTHESIA (OUTPATIENT)
Dept: OPERATING ROOM | Age: 61
End: 2021-03-29
Payer: COMMERCIAL

## 2021-03-29 VITALS — OXYGEN SATURATION: 99 % | DIASTOLIC BLOOD PRESSURE: 56 MMHG | SYSTOLIC BLOOD PRESSURE: 103 MMHG

## 2021-03-29 VITALS
SYSTOLIC BLOOD PRESSURE: 128 MMHG | WEIGHT: 198.6 LBS | HEIGHT: 62 IN | RESPIRATION RATE: 18 BRPM | HEART RATE: 74 BPM | BODY MASS INDEX: 36.55 KG/M2 | TEMPERATURE: 98.6 F | DIASTOLIC BLOOD PRESSURE: 58 MMHG | OXYGEN SATURATION: 96 %

## 2021-03-29 DIAGNOSIS — Z98.890 STATUS POST LEFT ROTATOR CUFF REPAIR: Primary | ICD-10-CM

## 2021-03-29 PROCEDURE — 7100000011 HC PHASE II RECOVERY - ADDTL 15 MIN: Performed by: ORTHOPAEDIC SURGERY

## 2021-03-29 PROCEDURE — 7100000010 HC PHASE II RECOVERY - FIRST 15 MIN: Performed by: ORTHOPAEDIC SURGERY

## 2021-03-29 PROCEDURE — 3600000014 HC SURGERY LEVEL 4 ADDTL 15MIN: Performed by: ORTHOPAEDIC SURGERY

## 2021-03-29 PROCEDURE — 2580000003 HC RX 258: Performed by: ORTHOPAEDIC SURGERY

## 2021-03-29 PROCEDURE — 7100000000 HC PACU RECOVERY - FIRST 15 MIN: Performed by: ORTHOPAEDIC SURGERY

## 2021-03-29 PROCEDURE — 3700000000 HC ANESTHESIA ATTENDED CARE: Performed by: ORTHOPAEDIC SURGERY

## 2021-03-29 PROCEDURE — C1713 ANCHOR/SCREW BN/BN,TIS/BN: HCPCS | Performed by: ORTHOPAEDIC SURGERY

## 2021-03-29 PROCEDURE — 3600000004 HC SURGERY LEVEL 4 BASE: Performed by: ORTHOPAEDIC SURGERY

## 2021-03-29 PROCEDURE — 2709999900 HC NON-CHARGEABLE SUPPLY: Performed by: ORTHOPAEDIC SURGERY

## 2021-03-29 PROCEDURE — 3700000001 HC ADD 15 MINUTES (ANESTHESIA): Performed by: ORTHOPAEDIC SURGERY

## 2021-03-29 PROCEDURE — 2500000003 HC RX 250 WO HCPCS: Performed by: NURSE ANESTHETIST, CERTIFIED REGISTERED

## 2021-03-29 PROCEDURE — 6360000002 HC RX W HCPCS: Performed by: NURSE ANESTHETIST, CERTIFIED REGISTERED

## 2021-03-29 PROCEDURE — 64415 NJX AA&/STRD BRCH PLXS IMG: CPT | Performed by: NURSE ANESTHETIST, CERTIFIED REGISTERED

## 2021-03-29 PROCEDURE — 6370000000 HC RX 637 (ALT 250 FOR IP): Performed by: OBSTETRICS & GYNECOLOGY

## 2021-03-29 PROCEDURE — 2580000003 HC RX 258: Performed by: OBSTETRICS & GYNECOLOGY

## 2021-03-29 PROCEDURE — 2720000010 HC SURG SUPPLY STERILE: Performed by: ORTHOPAEDIC SURGERY

## 2021-03-29 PROCEDURE — 6360000002 HC RX W HCPCS: Performed by: ORTHOPAEDIC SURGERY

## 2021-03-29 PROCEDURE — 7100000001 HC PACU RECOVERY - ADDTL 15 MIN: Performed by: ORTHOPAEDIC SURGERY

## 2021-03-29 DEVICE — ANCHOR SUT L14.7MM DIA5.5MM BIOCOM SZ 2 ORDER MULT OF 5EA: Type: IMPLANTABLE DEVICE | Site: SHOULDER | Status: FUNCTIONAL

## 2021-03-29 DEVICE — SYSTEM IMPL INCL 2 4.75MM BIOCOMPOSITE SWIVELOCK C ANCHR: Type: IMPLANTABLE DEVICE | Site: SHOULDER | Status: FUNCTIONAL

## 2021-03-29 RX ORDER — ROPIVACAINE HYDROCHLORIDE 5 MG/ML
INJECTION, SOLUTION EPIDURAL; INFILTRATION; PERINEURAL PRN
Status: DISCONTINUED | OUTPATIENT
Start: 2021-03-29 | End: 2021-03-29 | Stop reason: SDUPTHER

## 2021-03-29 RX ORDER — FENTANYL CITRATE 50 UG/ML
INJECTION, SOLUTION INTRAMUSCULAR; INTRAVENOUS PRN
Status: DISCONTINUED | OUTPATIENT
Start: 2021-03-29 | End: 2021-03-29 | Stop reason: SDUPTHER

## 2021-03-29 RX ORDER — MIDAZOLAM HYDROCHLORIDE 1 MG/ML
INJECTION INTRAMUSCULAR; INTRAVENOUS PRN
Status: DISCONTINUED | OUTPATIENT
Start: 2021-03-29 | End: 2021-03-29 | Stop reason: SDUPTHER

## 2021-03-29 RX ORDER — KETOROLAC TROMETHAMINE 30 MG/ML
INJECTION, SOLUTION INTRAMUSCULAR; INTRAVENOUS PRN
Status: DISCONTINUED | OUTPATIENT
Start: 2021-03-29 | End: 2021-03-29 | Stop reason: SDUPTHER

## 2021-03-29 RX ORDER — PHENYLEPHRINE HYDROCHLORIDE 10 MG/ML
INJECTION INTRAVENOUS PRN
Status: DISCONTINUED | OUTPATIENT
Start: 2021-03-29 | End: 2021-03-29 | Stop reason: SDUPTHER

## 2021-03-29 RX ORDER — FENTANYL CITRATE 50 UG/ML
50 INJECTION, SOLUTION INTRAMUSCULAR; INTRAVENOUS EVERY 5 MIN PRN
Status: DISCONTINUED | OUTPATIENT
Start: 2021-03-29 | End: 2021-03-29 | Stop reason: HOSPADM

## 2021-03-29 RX ORDER — NEOSTIGMINE METHYLSULFATE 1 MG/ML
INJECTION, SOLUTION INTRAVENOUS PRN
Status: DISCONTINUED | OUTPATIENT
Start: 2021-03-29 | End: 2021-03-29 | Stop reason: SDUPTHER

## 2021-03-29 RX ORDER — GLYCOPYRROLATE 1 MG/5 ML
SYRINGE (ML) INTRAVENOUS PRN
Status: DISCONTINUED | OUTPATIENT
Start: 2021-03-29 | End: 2021-03-29 | Stop reason: SDUPTHER

## 2021-03-29 RX ORDER — DEXAMETHASONE SODIUM PHOSPHATE 10 MG/ML
INJECTION, SOLUTION INTRAMUSCULAR; INTRAVENOUS PRN
Status: DISCONTINUED | OUTPATIENT
Start: 2021-03-29 | End: 2021-03-29 | Stop reason: SDUPTHER

## 2021-03-29 RX ORDER — DIMENHYDRINATE 50 MG/1
50 TABLET ORAL ONCE
Status: COMPLETED | OUTPATIENT
Start: 2021-03-29 | End: 2021-03-29

## 2021-03-29 RX ORDER — LIDOCAINE HYDROCHLORIDE 20 MG/ML
INJECTION, SOLUTION EPIDURAL; INFILTRATION; INTRACAUDAL; PERINEURAL PRN
Status: DISCONTINUED | OUTPATIENT
Start: 2021-03-29 | End: 2021-03-29 | Stop reason: SDUPTHER

## 2021-03-29 RX ORDER — PROMETHAZINE HYDROCHLORIDE 25 MG/ML
6.25 INJECTION, SOLUTION INTRAMUSCULAR; INTRAVENOUS
Status: DISCONTINUED | OUTPATIENT
Start: 2021-03-29 | End: 2021-03-29 | Stop reason: HOSPADM

## 2021-03-29 RX ORDER — CEFAZOLIN SODIUM 2 G/50ML
2000 SOLUTION INTRAVENOUS ONCE
Status: COMPLETED | OUTPATIENT
Start: 2021-03-29 | End: 2021-03-29

## 2021-03-29 RX ORDER — SODIUM CHLORIDE, SODIUM LACTATE, POTASSIUM CHLORIDE, CALCIUM CHLORIDE 600; 310; 30; 20 MG/100ML; MG/100ML; MG/100ML; MG/100ML
INJECTION, SOLUTION INTRAVENOUS CONTINUOUS
Status: DISCONTINUED | OUTPATIENT
Start: 2021-03-29 | End: 2021-03-29 | Stop reason: HOSPADM

## 2021-03-29 RX ORDER — HYDROCODONE BITARTRATE AND ACETAMINOPHEN 5; 325 MG/1; MG/1
1-2 TABLET ORAL EVERY 4 HOURS PRN
Qty: 42 TABLET | Refills: 0 | Status: SHIPPED | OUTPATIENT
Start: 2021-03-29 | End: 2021-04-05

## 2021-03-29 RX ORDER — PROPOFOL 10 MG/ML
INJECTION, EMULSION INTRAVENOUS PRN
Status: DISCONTINUED | OUTPATIENT
Start: 2021-03-29 | End: 2021-03-29 | Stop reason: SDUPTHER

## 2021-03-29 RX ORDER — DEXAMETHASONE SODIUM PHOSPHATE 4 MG/ML
INJECTION, SOLUTION INTRA-ARTICULAR; INTRALESIONAL; INTRAMUSCULAR; INTRAVENOUS; SOFT TISSUE PRN
Status: DISCONTINUED | OUTPATIENT
Start: 2021-03-29 | End: 2021-03-29 | Stop reason: SDUPTHER

## 2021-03-29 RX ORDER — HYDROCODONE BITARTRATE AND ACETAMINOPHEN 5; 325 MG/1; MG/1
1 TABLET ORAL
Status: DISCONTINUED | OUTPATIENT
Start: 2021-03-29 | End: 2021-03-29 | Stop reason: HOSPADM

## 2021-03-29 RX ORDER — ONDANSETRON 2 MG/ML
INJECTION INTRAMUSCULAR; INTRAVENOUS PRN
Status: DISCONTINUED | OUTPATIENT
Start: 2021-03-29 | End: 2021-03-29 | Stop reason: SDUPTHER

## 2021-03-29 RX ORDER — ROCURONIUM BROMIDE 10 MG/ML
INJECTION, SOLUTION INTRAVENOUS PRN
Status: DISCONTINUED | OUTPATIENT
Start: 2021-03-29 | End: 2021-03-29 | Stop reason: SDUPTHER

## 2021-03-29 RX ORDER — METOCLOPRAMIDE HYDROCHLORIDE 5 MG/ML
10 INJECTION INTRAMUSCULAR; INTRAVENOUS
Status: DISCONTINUED | OUTPATIENT
Start: 2021-03-29 | End: 2021-03-29 | Stop reason: HOSPADM

## 2021-03-29 RX ORDER — ACETAMINOPHEN 325 MG/1
650 TABLET ORAL ONCE
Status: COMPLETED | OUTPATIENT
Start: 2021-03-29 | End: 2021-03-29

## 2021-03-29 RX ADMIN — PHENYLEPHRINE HYDROCHLORIDE 100 MCG: 10 INJECTION INTRAVENOUS at 08:46

## 2021-03-29 RX ADMIN — PHENYLEPHRINE HYDROCHLORIDE 100 MCG: 10 INJECTION INTRAVENOUS at 10:24

## 2021-03-29 RX ADMIN — PHENYLEPHRINE HYDROCHLORIDE 100 MCG: 10 INJECTION INTRAVENOUS at 10:31

## 2021-03-29 RX ADMIN — ROCURONIUM BROMIDE 50 MG: 10 INJECTION, SOLUTION INTRAVENOUS at 07:50

## 2021-03-29 RX ADMIN — PROPOFOL 150 MG: 10 INJECTION, EMULSION INTRAVENOUS at 07:50

## 2021-03-29 RX ADMIN — PHENYLEPHRINE HYDROCHLORIDE 100 MCG: 10 INJECTION INTRAVENOUS at 09:05

## 2021-03-29 RX ADMIN — SODIUM CHLORIDE, POTASSIUM CHLORIDE, SODIUM LACTATE AND CALCIUM CHLORIDE: 600; 310; 30; 20 INJECTION, SOLUTION INTRAVENOUS at 07:43

## 2021-03-29 RX ADMIN — PHENYLEPHRINE HYDROCHLORIDE 100 MCG: 10 INJECTION INTRAVENOUS at 10:37

## 2021-03-29 RX ADMIN — DIMENHYDRINATE 50 MG: 50 TABLET ORAL at 07:00

## 2021-03-29 RX ADMIN — ROPIVACAINE HYDROCHLORIDE 20 ML: 5 INJECTION, SOLUTION EPIDURAL; INFILTRATION; PERINEURAL at 07:25

## 2021-03-29 RX ADMIN — PHENYLEPHRINE HYDROCHLORIDE 100 MCG: 10 INJECTION INTRAVENOUS at 08:07

## 2021-03-29 RX ADMIN — PHENYLEPHRINE HYDROCHLORIDE 100 MCG: 10 INJECTION INTRAVENOUS at 10:35

## 2021-03-29 RX ADMIN — Medication 0.4 MG: at 10:47

## 2021-03-29 RX ADMIN — FENTANYL CITRATE 50 MCG: 50 INJECTION, SOLUTION INTRAMUSCULAR; INTRAVENOUS at 07:50

## 2021-03-29 RX ADMIN — PHENYLEPHRINE HYDROCHLORIDE 100 MCG: 10 INJECTION INTRAVENOUS at 08:31

## 2021-03-29 RX ADMIN — KETOROLAC TROMETHAMINE 30 MG: 30 INJECTION, SOLUTION INTRAMUSCULAR; INTRAVENOUS at 10:47

## 2021-03-29 RX ADMIN — FENTANYL CITRATE 50 MCG: 50 INJECTION, SOLUTION INTRAMUSCULAR; INTRAVENOUS at 09:26

## 2021-03-29 RX ADMIN — PHENYLEPHRINE HYDROCHLORIDE 100 MCG: 10 INJECTION INTRAVENOUS at 08:34

## 2021-03-29 RX ADMIN — PHENYLEPHRINE HYDROCHLORIDE 100 MCG: 10 INJECTION INTRAVENOUS at 09:19

## 2021-03-29 RX ADMIN — SODIUM CHLORIDE, POTASSIUM CHLORIDE, SODIUM LACTATE AND CALCIUM CHLORIDE: 600; 310; 30; 20 INJECTION, SOLUTION INTRAVENOUS at 10:19

## 2021-03-29 RX ADMIN — PHENYLEPHRINE HYDROCHLORIDE 100 MCG: 10 INJECTION INTRAVENOUS at 09:51

## 2021-03-29 RX ADMIN — PHENYLEPHRINE HYDROCHLORIDE 100 MCG: 10 INJECTION INTRAVENOUS at 10:19

## 2021-03-29 RX ADMIN — PHENYLEPHRINE HYDROCHLORIDE 100 MCG: 10 INJECTION INTRAVENOUS at 10:06

## 2021-03-29 RX ADMIN — PHENYLEPHRINE HYDROCHLORIDE 200 MCG: 10 INJECTION INTRAVENOUS at 10:16

## 2021-03-29 RX ADMIN — ACETAMINOPHEN 650 MG: 325 TABLET ORAL at 07:00

## 2021-03-29 RX ADMIN — PHENYLEPHRINE HYDROCHLORIDE 100 MCG: 10 INJECTION INTRAVENOUS at 10:01

## 2021-03-29 RX ADMIN — LIDOCAINE HYDROCHLORIDE 100 MG: 20 INJECTION, SOLUTION EPIDURAL; INFILTRATION; INTRACAUDAL; PERINEURAL at 07:50

## 2021-03-29 RX ADMIN — DEXAMETHASONE SODIUM PHOSPHATE 4 MG: 4 INJECTION, SOLUTION INTRAMUSCULAR; INTRAVENOUS at 08:28

## 2021-03-29 RX ADMIN — PHENYLEPHRINE HYDROCHLORIDE 100 MCG: 10 INJECTION INTRAVENOUS at 09:46

## 2021-03-29 RX ADMIN — PHENYLEPHRINE HYDROCHLORIDE 100 MCG: 10 INJECTION INTRAVENOUS at 10:42

## 2021-03-29 RX ADMIN — PHENYLEPHRINE HYDROCHLORIDE 100 MCG: 10 INJECTION INTRAVENOUS at 09:13

## 2021-03-29 RX ADMIN — PHENYLEPHRINE HYDROCHLORIDE 300 MCG: 10 INJECTION INTRAVENOUS at 08:16

## 2021-03-29 RX ADMIN — PHENYLEPHRINE HYDROCHLORIDE 100 MCG: 10 INJECTION INTRAVENOUS at 09:56

## 2021-03-29 RX ADMIN — PHENYLEPHRINE HYDROCHLORIDE 200 MCG: 10 INJECTION INTRAVENOUS at 08:10

## 2021-03-29 RX ADMIN — PHENYLEPHRINE HYDROCHLORIDE 100 MCG: 10 INJECTION INTRAVENOUS at 10:27

## 2021-03-29 RX ADMIN — PHENYLEPHRINE HYDROCHLORIDE 100 MCG: 10 INJECTION INTRAVENOUS at 09:15

## 2021-03-29 RX ADMIN — PHENYLEPHRINE HYDROCHLORIDE 100 MCG: 10 INJECTION INTRAVENOUS at 09:36

## 2021-03-29 RX ADMIN — PHENYLEPHRINE HYDROCHLORIDE 100 MCG: 10 INJECTION INTRAVENOUS at 09:26

## 2021-03-29 RX ADMIN — PHENYLEPHRINE HYDROCHLORIDE 100 MCG: 10 INJECTION INTRAVENOUS at 09:40

## 2021-03-29 RX ADMIN — MIDAZOLAM 2 MG: 1 INJECTION INTRAMUSCULAR; INTRAVENOUS at 07:20

## 2021-03-29 RX ADMIN — PHENYLEPHRINE HYDROCHLORIDE 100 MCG: 10 INJECTION INTRAVENOUS at 09:31

## 2021-03-29 RX ADMIN — PHENYLEPHRINE HYDROCHLORIDE 100 MCG: 10 INJECTION INTRAVENOUS at 09:10

## 2021-03-29 RX ADMIN — ONDANSETRON 4 MG: 2 INJECTION INTRAMUSCULAR; INTRAVENOUS at 08:28

## 2021-03-29 RX ADMIN — PHENYLEPHRINE HYDROCHLORIDE 100 MCG: 10 INJECTION INTRAVENOUS at 08:58

## 2021-03-29 RX ADMIN — PHENYLEPHRINE HYDROCHLORIDE 200 MCG: 10 INJECTION INTRAVENOUS at 08:27

## 2021-03-29 RX ADMIN — PHENYLEPHRINE HYDROCHLORIDE 100 MCG: 10 INJECTION INTRAVENOUS at 10:41

## 2021-03-29 RX ADMIN — CEFAZOLIN SODIUM 2000 MG: 2 SOLUTION INTRAVENOUS at 07:40

## 2021-03-29 RX ADMIN — PHENYLEPHRINE HYDROCHLORIDE 100 MCG: 10 INJECTION INTRAVENOUS at 08:22

## 2021-03-29 RX ADMIN — DEXAMETHASONE SODIUM PHOSPHATE 10 MG: 10 INJECTION, SOLUTION INTRAMUSCULAR; INTRAVENOUS at 07:25

## 2021-03-29 RX ADMIN — PHENYLEPHRINE HYDROCHLORIDE 100 MCG: 10 INJECTION INTRAVENOUS at 10:12

## 2021-03-29 RX ADMIN — PHENYLEPHRINE HYDROCHLORIDE 100 MCG: 10 INJECTION INTRAVENOUS at 08:43

## 2021-03-29 RX ADMIN — Medication 3 MG: at 10:47

## 2021-03-29 RX ADMIN — PHENYLEPHRINE HYDROCHLORIDE 200 MCG: 10 INJECTION INTRAVENOUS at 08:40

## 2021-03-29 RX ADMIN — PHENYLEPHRINE HYDROCHLORIDE 100 MCG: 10 INJECTION INTRAVENOUS at 10:22

## 2021-03-29 RX ADMIN — PHENYLEPHRINE HYDROCHLORIDE 200 MCG: 10 INJECTION INTRAVENOUS at 08:52

## 2021-03-29 ASSESSMENT — PAIN SCALES - GENERAL
PAINLEVEL_OUTOF10: 1

## 2021-03-29 ASSESSMENT — PAIN DESCRIPTION - LOCATION: LOCATION: ARM;SHOULDER

## 2021-03-29 ASSESSMENT — PAIN DESCRIPTION - DESCRIPTORS
DESCRIPTORS: ACHING
DESCRIPTORS: PRESSURE

## 2021-03-29 ASSESSMENT — PAIN DESCRIPTION - ORIENTATION: ORIENTATION: LEFT

## 2021-03-29 NOTE — ANESTHESIA PRE PROCEDURE
Department of Anesthesiology  Preprocedure Note       Name:  Severo Da Silva   Age:  63 y.o.  :  1960                                          MRN:  [id number]         Date:  3/29/2021      Surgeon: Blue Lin):  Selvin Castillo MD    Procedure: Procedure(s):  SHOULDER ARTHROSCOPY, RCR, DEBRIDEMENT, SUBACROMIAL DECOMPRESSION,  JEN PROCEDURE, BICEPS TENODESIS    Medications prior to admission:   Prior to Admission medications    Medication Sig Start Date End Date Taking? Authorizing Provider   diclofenac (VOLTAREN) 75 MG EC tablet Take 1 tablet by mouth 2 times daily 3/8/21  Yes Maria De Jesus Gloss Might, APRN - CNP   pantoprazole (PROTONIX) 40 MG tablet TAKE 1 TABLET BY MOUTH ONCE DAILY FOR 30 DAYS 20  Yes GIOVANI Macias CNP   ezetimibe (ZETIA) 10 MG tablet Take 1 tablet by mouth once daily 21  Yes Maria De Jesus Gloss Might, APRN - CNP   Semaglutide (RYBELSUS) 7 MG TABS Take 7 mg by mouth daily 10/19/20  Yes Maria De Jesus Gloss Might, APRN - CNP   pioglitazone (ACTOS) 30 MG tablet Take 1 tablet by mouth daily 10/19/20  Yes Maria De Jesus Gloss Might, APRN - CNP   verapamil (CALAN SR) 120 MG extended release tablet Take 1 tablet by mouth once daily 10/6/20  Yes Maria De Jesus Gloss Might, APRN - CNP   labetalol (NORMODYNE) 100 MG tablet Take 1 tablet by mouth 2 times daily 10/6/20  Yes Maria De Jesus Gloss Might, APRN - CNP   lisinopril (PRINIVIL;ZESTRIL) 20 MG tablet Take 1 tablet by mouth daily 9/15/20  Yes GIOVANI Painter - CNP       Current medications:    Current Facility-Administered Medications   Medication Dose Route Frequency Provider Last Rate Last Admin    ceFAZolin (ANCEF) 2000 mg in dextrose 3 % 50 mL IVPB (duplex)  2,000 mg Intravenous Once Selvin Castillo MD        lactated ringers infusion   Intravenous Continuous Deandra Stacy DO           Allergies:     Allergies   Allergen Reactions    Statins Nausea And Vomiting and Swelling       Problem List:    Patient Active Problem List   Diagnosis Code    Gastroenteritis, acute K52.9  Diabetes mellitus type 2 with complications, uncontrolled (HCC) E11.8, E11.65    Dyslipidemia E78.5    Essential hypertension I10    Uncontrolled type 2 diabetes mellitus with hyperglycemia (HCC) E11.65    Positive FIT (fecal immunochemical test) R19.5    Acute nonintractable headache R51.9    Lipoma of left upper extremity D17.22       Past Medical History:        Diagnosis Date    Diabetes mellitus ([address]     Hepatitis A     Hyperlipidemia     Hypertension        Past Surgical History:        Procedure Laterality Date    APPENDECTOMY      ARM SURGERY Left 10/29/2020    lesion biopsy excision multiple lipomas    ARM SURGERY Bilateral 10/29/2020    ARM LESION BIOPSY EXCISION, MULTIPLE LIPOMAS performed by Gideon Xiong DO at [address]  10/23/2020    Dr. Jorge Stafford - polypectomy, hot biopsy    COLONOSCOPY N/A 10/23/2020    COLONOSCOPY [facility]OMY [facility] [address] [facility] [facility]  [facility]    COLONOSCOPY BIOPSY/STOMA performed by Gideon Xiong DO at [address] [facility] ENDOSCOPY  12/04/2020    [facility]-Dr. mEil Smith       Social History:    Social History     Tobacco Use    Smoking status: Never Smoker    Smokeless tobacco: Never Used   Substance Use Topics    Alcohol use:  No                                Counseling given: Not Answered      Vital Signs (Current):   Vitals:    03/19/21 0849 03/29/21 0640   BP:  (!) 175/89   Pulse:  74   Resp:  20   Temp:  36.5 °C (97.7 °F)   TempSrc:  Temporal   SpO2:  98%   Weight: 183 lb (83 kg) 198 lb 9.6 oz (90.1 kg)   Height: 5' 2\" (1.575 m) 5' 2\" (1.575 m)                                              BP Readings from Last 3 Encounters:   03/29/21 (!) 175/89   03/08/21 130/82   02/03/21 136/82       NPO Status: Time of last liquid consumption: 0000                        Time of last solid consumption: 1900 Date of last liquid consumption: 03/29/21                        Date of last solid food consumption: 03/28/21    BMI:   Wt Readings from Last 3 Encounters:   03/29/21 198 lb 9.6 oz (90.1 kg)   03/08/21 188 lb 4.8 oz (85.4 kg)   02/03/21 190 lb 3.2 oz (86.3 kg)     Body mass index is 36.32 kg/m². CBC:   Lab Results   Component Value Date    WBC 7.6 08/03/2020    RBC 5.30 08/03/2020    HGB 14.6 08/03/2020    HCT 46.8 08/03/2020    MCV 88.3 08/03/2020    RDW 13.2 08/03/2020     08/03/2020       CMP:   Lab Results   Component Value Date     08/03/2020    K 4.2 08/03/2020     08/03/2020    CO2 25 08/03/2020    BUN 17 08/03/2020    CREATININE 0.82 08/03/2020    GFRAA >60 08/03/2020    LABGLOM >60 08/03/2020    GLUCOSE 243 08/03/2020    PROT 7.7 10/17/2020    CALCIUM 9.9 08/03/2020    BILITOT 0.45 10/17/2020    ALKPHOS 65 10/17/2020    AST 44 10/17/2020    ALT 55 10/17/2020       POC Tests: No results for input(s): POCGLU, POCNA, POCK, POCCL, POCBUN, POCHEMO, POCHCT in the last 72 hours.     Coags: No results found for: PROTIME, INR, APTT    HCG (If Applicable): No results found for: PREGTESTUR, PREGSERUM, HCG, HCGQUANT     ABGs: No results found for: PHART, PO2ART, DJL2OBN, KGZ7VAS, BEART, Z3SNLIMB     Type & Screen (If Applicable):  No results found for: LABABO, LABRH    Drug/Infectious Status (If Applicable):  Lab Results   Component Value Date    HEPCAB NONREACTIVE 08/25/2016       COVID-19 Screening (If Applicable):   Lab Results   Component Value Date    COVID19 Not Detected 03/22/2021    COVID19 Not Detected 10/16/2020           Anesthesia Evaluation  Patient summary reviewed and Nursing notes reviewed no history of anesthetic complications:   Airway: Mallampati: III  TM distance: >3 FB   Neck ROM: full  Mouth opening: > = 3 FB Dental:          Pulmonary:Negative Pulmonary ROS and normal exam  breath sounds clear to auscultation                             Cardiovascular:  Exercise tolerance: good (>4 METS),   (+) hypertension:,         Rhythm: regular  Rate: normal           Beta Blocker:  Dose within 24 Hrs         Neuro/Psych:   Negative Neuro/Psych ROS              GI/Hepatic/Renal:   (+) GERD: well controlled, hepatitis: A, liver disease:, morbid obesity          Endo/Other:    (+) DiabetesType II DM, well controlled, , .                 Abdominal:   (+) obese,         Vascular: negative vascular ROS. Anesthesia Plan      general     ASA 3       Induction: intravenous. MIPS: Postoperative opioids intended and Prophylactic antiemetics administered. Anesthetic plan and risks discussed with patient. Plan discussed with CRNA.                   GIOVANI Schmitt - ZULEYMA   3/29/2021

## 2021-03-29 NOTE — BRIEF OP NOTE
Brief Postoperative Note      Patient: Marielos Gaines  YOB: 1960  MRN: [id number]    Date of Procedure: 3/29/2021    Pre-Op Diagnosis: COMPLETE TEAR OF LEFT ROTATOR CUFF  Torn biceps tendon  Subacromial impingement  Torn glenoid labrum     Post-Op Diagnosis: same       Arthroscopy left shoulder  Debridement  SAD  Biceps tenodesis  Rotator cuff repair    Surgeon(s):  Hoa Sue MD    Assistant:  First Assistant: Ariella Harry    Anesthesia: General    Estimated Blood Loss (mL): Minimal    Complications: None    Specimens:   * No specimens in log *    Implants:  Implant Name Type Inv. Item Serial No.  Lot No. LRB No. Used Action   ANCHOR SUT L14.7MM DIA5. 5MM BIOCOMPOSITE W/ TWO SZ 2  ANCHOR SUT L14.7MM DIA5. 5MM BIOCOMPOSITE W/ TWO SZ 2  [employer] [id number] Left 1 Implanted   SYSTEM IMPL INCL 2 4.75MM BIOCOMPOSITE Tamra [facility]  SYSTEM IMPL INCL 2 4.75MM BIOCOMPOSITE [employer]  [employer] [id number] Left 1 Implanted         Drains: * No LDAs found *    Findings:     Electronically signed by Jeimy Brush MD on 3/29/2021 at 11:05 AM

## 2021-03-29 NOTE — PROGRESS NOTES
Patient states ready to be discharged at this time. Discharge instructions given to pt and spouse. Both verbalize understanding,deny any questions and/or concerns. Pt transfer off unit in wheelchair w/ spouse and all belongings. Discharge Criteria    Inpatients must meet Criteria 1 through 7. All other patients are either YES or N/A. If a NO is chosen then Anesthesia or Surgeon must be notified. 1.  Minimum 30 minutes after last dose of sedative medication, minimum 120 minutes after last dose of reversal agent. Yes      2. Systolic BP stable within 20 mmHg for 30 minutes & systolic BP between 90 & 540 or within 10 mmHg of baseline. Yes      3. Pulse between 60 and 100 or within 10 bpm of baseline. Yes      4. Spontaneous respiratory rate >/= 10 per minute. Yes      5. SaO2 >/= 95 or  >/= baseline. Yes      6. Able to cough and swallow or return to baseline function. Yes      7. Alert and oriented or return to baseline mental status. Yes      8. Demonstrates controlled, coordinated movements, ambulates with steady gait, or return to baseline activity function. Yes      9. Minimal or no pain or nausea, or at a level tolerable and acceptable to patient. Yes      10. Takes and retains oral fluids as allowed. Yes      11. Procedural / perioperative site stable. Minimal or no bleeding. Yes          12. If GI endoscopy procedure, minimal or no abdominal distention or passing flatus. N/A      13. Written discharge instructions and emergency telephone number provided. Yes      14. Accompanied by a responsible adult.     Yes

## 2021-03-29 NOTE — ANESTHESIA PROCEDURE NOTES
Peripheral Block    Patient location during procedure: pre-op  Start time: 3/29/2021 7:20 AM  End time: 3/29/2021 7:25 AM  Staffing  Performed: resident/CRNA   Resident/CRNA: GIOVANI Gannon CRNA  Other anesthesia staff: GIOVANI Garcia CRNA  Preanesthetic Checklist  Completed: patient identified, IV checked, site marked, risks and benefits discussed, surgical consent, monitors and equipment checked, pre-op evaluation, timeout performed, anesthesia consent given, oxygen available and patient being monitored  Peripheral Block  Patient position: sitting  Prep: ChloraPrep  Patient monitoring: continuous pulse ox and IV access  Block type: Brachial plexus  Laterality: left  Injection technique: single-shot  Guidance: ultrasound guided  Local infiltration: ropivacaine and decadron  Infiltration strength: 0.5 %  Dose: 20 mL  Interscalene  Provider prep: mask and sterile gloves  Local infiltration: ropivacaine and decadron  Needle  Needle type: combined needle/nerve stimulator   Needle gauge: 20 G  Needle length: 5 cm  Needle localization: ultrasound guidance  Assessment  Injection assessment: negative aspiration for heme, no paresthesia on injection and local visualized surrounding nerve on ultrasound  Paresthesia pain: none  Slow fractionated injection: yes  Hemodynamics: stable  Reason for block: post-op pain management and at surgeon's request

## 2021-03-30 NOTE — OP NOTE
[address], [place] [address]                                OPERATIVE REPORT    PATIENT NAME: Neena Lyle                     :        1960  MED REC NO:   [id number]                              ROOM:  ACCOUNT NO:   [de-identified]                           ADMIT DATE: 2021  PROVIDER:     Kevin Santo    DATE OF PROCEDURE:  2021    PREOPERATIVE DIAGNOSES:  1. Torn left rotator cuff. 2.  Torn biceps tendon. 3.  Subacromial impingement. 4.  Torn glenoid labrum. POSTOPERATIVE DIAGNOSES:  1. Torn left rotator cuff. 2.  Torn biceps tendon. 3.  Subacromial impingement. 4.  Torn glenoid labrum. PROCEDURES PERFORMED:  Arthroscopy of left shoulder with:  1.  Debridement. 2.  Subacromial decompression, bursectomy. 3.  Arthroscopic rotator cuff repair with a double row repair. 4.  Arthroscopic biceps tenodesis. SURGEON:  Dr. Kevin Maher. [place]. ANESTHESIA:  General with scalene block. DESCRIPTION OF PROCEDURE:  The patient was brought into the operating  room and placed in the supine position on the operating table. General  anesthetic was administered. Scalene block was in place. The patient  was placed into a beach-chair position. She received Ancef  preoperatively. Left shoulder girdle was prepped with ChloraPrep and  draped in a sterile fashion. Arthroscope was brought in through a  posterior portal.  The shoulder joint was then extended with lactated  Ringer's solution with epinephrine. The patient was noted to have some  tearing of the glenoid labrum. There was a large tear of the rotator  cuff involving the supraspinatus and there was about 50% tear of the  biceps tendon. The articular surface of the glenoid and humeral head  were intact except for some minor scuffing. The anterior portal was  then created and the shaver was brought in. Partial synovectomy was  carried out.   The glenoid labrum was trimmed up appropriately and the  undersurface of the rotator cuff was cleaned up. Anterolateral portal  was then created and the shaver was brought in through the anterolateral  portal and the rotator cuff was debrided appropriately and then the  biceps tendon was released with electrocautery. Good clean contour was  obtained. The arthroscope was then redirected into the subacromial  space. A posterior lateral portal was created and the shaver was  brought in. Partial bursectomy was then carried out. The ablator was  used to demarcate the acromion. There was obvious impingement occurring  on the tip of the acromion, where a lot of inflammatory tissue was  present. A large hook was noted. A barrel bur was used from the  posterolateral portal and this was taken down. Good clean contour was  obtained. The region of the [facility] joint was inspected. No low _____ spurs  were noted. It was elected to leave this _____ alone. The bursectomy  was then completed freeing up all the gutters. The rotator cuff tear  was about a 2-cm tear involving the supraspinatus tendon, starting at  the margin of the bicipital groove and extending just into the fibers of  the infraspinatus. There was a T-component noted to this. The  tuberosity area was roughened up with the bur to get a good clean  contour. The biceps tendon was then approached. A stitch was placed in  this for traction and then an Arthrex 5.5 bioabsorbable anchor was  placed into the bicipital groove. The two FiberWire pairs of sutures  were placed through the biceps tendon and then these were  arthroscopically tied down. The stump of the biceps tendon was then  incised and removed. One of the pairs of sutures was then brought  through the rotator cuff right at the corner and this was  arthroscopically tied down giving good rehab position here.   It was then  elected to put a stitch within the rotator cuff to help decrease the  size of the tear because of its T-configuration. A FiberWire was then  placed through the rotator cuff in a figure-of-eight fashion. This was  tied down arthroscopically and excellent reapproximation of the rotator  cuff was then obtained. The Arthrex system was used for double row  repair. The first anchor was placed along the articular margin, next to  the bicipital groove and the pair of stitches and FiberTapes were placed  through the rotator cuff together and the second anchor was placed along  the articular margin more posteriorly and again the two FiberTapes were  placed through the rotator cuff independently as one group. One  FiberTape from each of the two groups and the FiberWire from the center  stitch were then brought together and a lateral row anchor was placed  posteriorly. Excellent reapproximation of the rotator cuff to the  footprint was noted. The remaining two FiberTapes and FiberWire was  then used for the second lateral row anchor more anteriorly. Again  excellent reapproximation of the rotator cuff was noted. No further  impingement was noted as the shoulder was brought through a range of  motion. The shoulder joint was then thoroughly irrigated out and  drained. Portal sites were closed with 4-0 nylon interrupted simple  sutures, dressed with Adaptic, fluffs, ABDs, and a Tegaderm. The  patient was placed into a simple sling. General anesthetic was  discontinued. The patient received additional gram of Ancef and was  transferred to recovery in a stable condition. EBL montez MORALES    D: 03/29/2021 11:28:31       T: 03/29/2021 11:34:05     [id number]  Job#: [id number]     Doc#: [id number]    CC:

## 2021-04-13 DIAGNOSIS — I10 ESSENTIAL HYPERTENSION: ICD-10-CM

## 2021-04-13 RX ORDER — LABETALOL 100 MG/1
100 TABLET, FILM COATED ORAL 2 TIMES DAILY
Qty: 180 TABLET | Refills: 1 | Status: SHIPPED | OUTPATIENT
Start: 2021-04-13 | End: 2021-09-20 | Stop reason: SDUPTHER

## 2021-04-13 RX ORDER — LISINOPRIL 20 MG/1
20 TABLET ORAL DAILY
Qty: 90 TABLET | Refills: 1 | Status: SHIPPED | OUTPATIENT
Start: 2021-04-13 | End: 2021-09-20 | Stop reason: SDUPTHER

## 2021-04-13 NOTE — TELEPHONE ENCOUNTER
Date:  Future Appointments   Date Time Provider Abdullahi Harding   8/3/2021  2:40 PM zAra Dumont, APRN - CNP [facility]            Patient Active Problem List:     Gastroenteritis, acute     Diabetes mellitus type 2 with complications, uncontrolled ([facility] [address]     Dyslipidemia     Essential hypertension     Uncontrolled type 2 diabetes mellitus with hyperglycemia ([facility] [address]     Positive FIT (fecal immunochemical test)     Acute nonintractable headache     Lipoma of left upper extremity

## 2021-04-29 ENCOUNTER — OFFICE VISIT (OUTPATIENT)
Dept: PRIMARY CARE CLINIC | Age: 61
End: 2021-04-29
Payer: COMMERCIAL

## 2021-04-29 VITALS
SYSTOLIC BLOOD PRESSURE: 124 MMHG | BODY MASS INDEX: 37.86 KG/M2 | WEIGHT: 207 LBS | HEART RATE: 64 BPM | TEMPERATURE: 97.7 F | DIASTOLIC BLOOD PRESSURE: 78 MMHG | RESPIRATION RATE: 22 BRPM

## 2021-04-29 DIAGNOSIS — R60.0 LOWER LEG EDEMA: ICD-10-CM

## 2021-04-29 DIAGNOSIS — I10 ESSENTIAL HYPERTENSION: Primary | ICD-10-CM

## 2021-04-29 PROCEDURE — 99214 OFFICE O/P EST MOD 30 MIN: CPT | Performed by: NURSE PRACTITIONER

## 2021-04-29 RX ORDER — HYDROCHLOROTHIAZIDE 25 MG/1
25 TABLET ORAL EVERY MORNING
Qty: 90 TABLET | Refills: 3 | Status: SHIPPED | OUTPATIENT
Start: 2021-04-29 | End: 2021-08-30 | Stop reason: ALTCHOICE

## 2021-04-29 ASSESSMENT — ENCOUNTER SYMPTOMS
SORE THROAT: 0
RHINORRHEA: 0
WHEEZING: 0
COUGH: 0
ABDOMINAL PAIN: 0
VOMITING: 0
NAUSEA: 0
SHORTNESS OF BREATH: 0
DIARRHEA: 0
CONSTIPATION: 0

## 2021-04-29 NOTE — PROGRESS NOTES
Name: Geovani Lehman  : 1960         Chief Complaint:     Chief Complaint   Patient presents with    Leg Swelling     X 2 weeks, bilateral right>left,        History of Present Illness: Geovani Lemhan is a 63 y.o.  female who presents with Leg Swelling (X 2 weeks, bilateral right>left, )      Tamar Gibbs is here today for a routine office visit. She has noticed some more swelling in her lower legs especially with standing. Patient states she has tried elevation with little success. See below for further comment. Hypertension  This is a chronic problem. The current episode started more than 1 year ago. The problem is unchanged. The problem is controlled. Associated symptoms include peripheral edema. Pertinent negatives include no chest pain, headaches, neck pain, palpitations or shortness of breath. There are no associated agents to hypertension. Risk factors for coronary artery disease include diabetes mellitus, dyslipidemia, family history, post-menopausal state, obesity, stress and sedentary lifestyle. Past treatments include ACE inhibitors and beta blockers. The current treatment provides moderate improvement. Compliance problems include exercise and diet. There is no history of kidney disease, CAD/MI, CVA or heart failure. There is no history of chronic renal disease. Past Medical History:     Past Medical History:   Diagnosis Date    Diabetes mellitus ([address]     Hepatitis A     Hyperlipidemia     Hypertension       Reviewed all health maintenance requirements and ordered appropriate tests  There are no preventive care reminders to display for this patient.     Past Surgical History:     Past Surgical History:   Procedure Laterality Date    APPENDECTOMY      ARM SURGERY Left 10/29/2020    lesion biopsy excision multiple lipomas    ARM SURGERY Bilateral 10/29/2020    ARM LESION BIOPSY EXCISION, MULTIPLE LIPOMAS performed by Daren Foster DO at [facility] Edema (trace pitting ankle) present. Skin:     General: Skin is warm and dry. Findings: No rash. Neurological:      Mental Status: She is alert and oriented to person, place, and time. Psychiatric:         Mood and Affect: Mood normal.         Behavior: Behavior normal.         Data:     Lab Results   Component Value Date     08/03/2020    K 4.2 08/03/2020     08/03/2020    CO2 25 08/03/2020    BUN 17 08/03/2020    CREATININE 0.82 08/03/2020    GLUCOSE 243 08/03/2020    PROT 7.7 10/17/2020    LABALBU 4.6 10/17/2020    BILITOT 0.45 10/17/2020    ALKPHOS 65 10/17/2020    AST 44 10/17/2020    ALT 55 10/17/2020     Lab Results   Component Value Date    WBC 7.6 08/03/2020    RBC 5.30 08/03/2020    HGB 14.6 08/03/2020    HCT 46.8 08/03/2020    MCV 88.3 08/03/2020    MCH 27.5 08/03/2020    MCHC 31.2 08/03/2020    RDW 13.2 08/03/2020     08/03/2020    MPV 9.9 08/03/2020     Lab Results   Component Value Date    TSH 1.78 07/18/2016     Lab Results   Component Value Date    CHOL 218 10/17/2020    CHOL 271 11/10/2017    HDL 43 10/17/2020    LABA1C 6.5 02/03/2021       Assessment/Plan:      Diagnosis Orders   1. Essential hypertension  hydroCHLOROthiazide (HYDRODIURIL) 25 MG tablet   2. Lower leg edema  hydroCHLOROthiazide (HYDRODIURIL) 25 MG tablet     We will continue current medications and add hydrochlorothiazide 25 mg daily. She will call in a few weeks with progress. 1.  Wendy received counseling on the following healthy behaviors: nutrition, exercise and medication adherence  2. Patient given educational materials - see patient instructions  3. Was a self-tracking handout given in paper form or via [employer]? No  If yes, see orders or list here. 4.  Discussed use, benefit, and side effects of prescribed medications. Barriers to medication compliance addressed. All patient questions answered. Pt voiced understanding. 5.  Reviewed prior labs and health maintenance  6.   Continue current medications, diet and exercise. Completed Refills   Requested Prescriptions     Signed Prescriptions Disp Refills    hydroCHLOROthiazide (HYDRODIURIL) 25 MG tablet 90 tablet 3     Sig: Take 1 tablet by mouth every morning         Return if symptoms worsen or fail to improve.

## 2021-04-29 NOTE — PATIENT INSTRUCTIONS
SURVEY:    You may be receiving a survey from [employer] regarding your visit today. Please complete the survey to enable us to provide the highest quality of care to you and your family. If you cannot score us a very good on any question, please call the office to discuss how we could have made your experience a very good one. Thank you.

## 2021-05-24 ENCOUNTER — TELEPHONE (OUTPATIENT)
Dept: PRIMARY CARE CLINIC | Age: 61
End: 2021-05-24

## 2021-05-24 NOTE — TELEPHONE ENCOUNTER
Called patient and appt scheduled for tomorrow at 12:20pm.
Called patient and she states that she has been taking the HCTZ but \"it's not doing the trick\".
Did she start the hydrochlorothiazide that we ordered at last visit?
Needs to be seen. Thank you.
08/03/2021   AST Once 91/45/3948   Basic Metabolic Panel Once 89/28/8273   Hemoglobin A1C Once 08/02/2021   Lipid Panel Once 08/02/2021   Microalbumin, Ur Once 08/02/2021       Next Visit Date:  Future Appointments   Date Time Provider Abdullahi Harding   8/3/2021  2:40 PM Lucinda Dumont, APRN - CNP [facility]            Patient Active Problem List:     Gastroenteritis, acute     Diabetes mellitus type 2 with complications, uncontrolled ([facility] [address]     Dyslipidemia     Essential hypertension     Uncontrolled type 2 diabetes mellitus with hyperglycemia ([facility] [address]     Positive FIT (fecal immunochemical test)     Acute nonintractable headache     Lipoma of left upper extremity

## 2021-05-25 ENCOUNTER — OFFICE VISIT (OUTPATIENT)
Dept: PRIMARY CARE CLINIC | Age: 61
End: 2021-05-25
Payer: COMMERCIAL

## 2021-05-25 VITALS
BODY MASS INDEX: 38.76 KG/M2 | TEMPERATURE: 97.5 F | HEART RATE: 82 BPM | WEIGHT: 211.9 LBS | SYSTOLIC BLOOD PRESSURE: 118 MMHG | OXYGEN SATURATION: 100 % | DIASTOLIC BLOOD PRESSURE: 62 MMHG | RESPIRATION RATE: 20 BRPM

## 2021-05-25 DIAGNOSIS — M19.042 LOCALIZED OSTEOARTHRITIS OF HAND, LEFT: ICD-10-CM

## 2021-05-25 DIAGNOSIS — R60.0 LOWER LEG EDEMA: Primary | ICD-10-CM

## 2021-05-25 PROCEDURE — 99214 OFFICE O/P EST MOD 30 MIN: CPT | Performed by: NURSE PRACTITIONER

## 2021-05-25 RX ORDER — MELOXICAM 15 MG/1
15 TABLET ORAL DAILY
Qty: 90 TABLET | Refills: 0 | Status: SHIPPED | OUTPATIENT
Start: 2021-05-25 | End: 2021-08-30 | Stop reason: SDUPTHER

## 2021-05-25 SDOH — ECONOMIC STABILITY: FOOD INSECURITY: WITHIN THE PAST 12 MONTHS, THE FOOD YOU BOUGHT JUST DIDN'T LAST AND YOU DIDN'T HAVE MONEY TO GET MORE.: PATIENT DECLINED

## 2021-05-25 ASSESSMENT — ENCOUNTER SYMPTOMS
SHORTNESS OF BREATH: 0
SORE THROAT: 0
CONSTIPATION: 0
RHINORRHEA: 0
NAUSEA: 0
COUGH: 0
ABDOMINAL PAIN: 0
DIARRHEA: 0
VOMITING: 0
WHEEZING: 0

## 2021-05-25 ASSESSMENT — SOCIAL DETERMINANTS OF HEALTH (SDOH): HOW HARD IS IT FOR YOU TO PAY FOR THE VERY BASICS LIKE FOOD, HOUSING, MEDICAL CARE, AND HEATING?: PATIENT DECLINED

## 2021-05-25 NOTE — PROGRESS NOTES
Name: Tonya Figueroa  : 1960         Chief Complaint:     Chief Complaint   Patient presents with    Leg Swelling     bilateral legs/ankle. Right leg > left       History of Present Illness: Tonya Figueroa is a 63 y.o.  female who presents with Leg Swelling (bilateral legs/ankle. Right leg > left)      Chidi Thomas is here today for routine office visit. Lower leg edemapatient states she had significant swelling in her lower legs over the weekend. She states that it has now has resolved since she was able to elevate her legs. She states she did not change her diet and has not increased her salt intake. She did start hydrochlorothiazide a few weeks ago as instructed. Hand Pain   The incident occurred more than 1 week ago. Incident location: No specific incident. There was no injury mechanism. The pain is present in the left fingers and left hand. The quality of the pain is described as aching and cramping. The pain does not radiate. The pain is at a severity of 5/10. The pain is moderate. The pain has been intermittent since the incident. Pertinent negatives include no chest pain, muscle weakness, numbness or tingling. The symptoms are aggravated by movement, lifting and palpation. She has tried nothing for the symptoms. The treatment provided no relief. Past Medical History:     Past Medical History:   Diagnosis Date    Diabetes mellitus ([address]     Hepatitis A     Hyperlipidemia     Hypertension       Reviewed all health maintenance requirements and ordered appropriate tests  There are no preventive care reminders to display for this patient.     Past Surgical History:     Past Surgical History:   Procedure Laterality Date    APPENDECTOMY      ARM SURGERY Left 10/29/2020    lesion biopsy excision multiple lipomas    ARM SURGERY Bilateral 10/29/2020    ARM LESION BIOPSY EXCISION, MULTIPLE LIPOMAS performed by Marlein Bolton DO at [address] [place] [address] 10/23/2020    Dr. Basilia Bean - polypectomy, hot biopsy    COLONOSCOPY N/A 10/23/2020    COLONOSCOPY POLYPECTOMY HOT BIOPSY performed by Sonia Espinoza DO at [address] [place] [address]  10/23/2020    COLONOSCOPY BIOPSY/STOMA performed by Sonia Espinoza DO at [id number] [place] [address] ARTHROSCOPY Left 03/29/2021    SHOULDER ARTHROSCOPY, RCR, DEBRIDEMENT, SUBACROMIAL DECOMPRESSION,  BICEPS TENODESIS     SHOULDER ARTHROSCOPY Left 3/29/2021    SHOULDER ARTHROSCOPY, RCR, DEBRIDEMENT, SUBACROMIAL DECOMPRESSION,  BICEPS TENODESIS performed by Barron Soto MD at [facility]  12/04/2020    Rosen-Dr. Erasmo Mckeon        Medications:       Prior to Admission medications    Medication Sig Start Date End Date Taking? Authorizing Provider   meloxicam (MOBIC) 15 MG tablet Take 1 tablet by mouth daily 5/25/21  Yes GIOVANI Briscoe CNP   hydroCHLOROthiazide (HYDRODIURIL) 25 MG tablet Take 1 tablet by mouth every morning 4/29/21  Yes GIOVANI Briscoe CNP   verapamil (CALAN SR) 120 MG extended release tablet Take 1 tablet by mouth once daily 4/13/21  Yes GIOVANI Briscoe CNP   labetalol (NORMODYNE) 100 MG tablet Take 1 tablet by mouth 2 times daily 4/13/21  Yes GIOVANI Briscoe CNP   lisinopril (PRINIVIL;ZESTRIL) 20 MG tablet Take 1 tablet by mouth daily 4/13/21  Yes GIOVANI Briscoe CNP   pantoprazole (PROTONIX) 40 MG tablet TAKE 1 TABLET BY MOUTH ONCE DAILY FOR 30 DAYS 12/18/20  Yes GIOVANI Arteaga CNP   ezetimibe (ZETIA) 10 MG tablet Take 1 tablet by mouth once daily 1/6/21  Yes GIOVANI Briscoe CNP   Semaglutide (RYBELSUS) 7 MG TABS Take 7 mg by mouth daily 10/19/20  Yes GIOVANI Briscoe CNP        Allergies:       Statins    Social History:     Tobacco:    reports that she has never smoked. She has never used smokeless tobacco.  Alcohol:      reports no history of alcohol use.   Drug Use:  reports no history of drug use.    Family History:     Family History   Problem Relation Age of Onset    Diabetes Father        Review of Systems:     Positive and Negative as described in HPI    Review of Systems   Constitutional: Negative for chills, fatigue and fever. HENT: Negative for congestion, rhinorrhea and sore throat. Eyes: Negative for visual disturbance. Respiratory: Negative for cough, shortness of breath and wheezing. Cardiovascular: Positive for leg swelling. Negative for chest pain and palpitations. Gastrointestinal: Negative for abdominal pain, constipation, diarrhea, nausea and vomiting. Genitourinary: Negative for difficulty urinating and dysuria. Musculoskeletal: Positive for arthralgias. Negative for gait problem, neck pain and neck stiffness. Skin: Negative for rash. Neurological: Negative for dizziness, tingling, syncope, light-headedness, numbness and headaches. Physical Exam:   Vitals:  /62 (Position: Sitting)   Pulse 82   Temp 97.5 °F (36.4 °C) (Temporal)   Resp 20   Wt 211 lb 14.4 oz (96.1 kg)   SpO2 100%   BMI 38.76 kg/m²     Physical Exam  Vitals and nursing note reviewed. Constitutional:       General: She is not in acute distress. Appearance: Normal appearance. She is obese. She is not ill-appearing. HENT:      Mouth/Throat:      Mouth: Mucous membranes are moist.   Eyes:      General: No scleral icterus. Conjunctiva/sclera: Conjunctivae normal.   Cardiovascular:      Rate and Rhythm: Normal rate and regular rhythm. Heart sounds: No murmur heard. Pulmonary:      Effort: Pulmonary effort is normal.      Breath sounds: Normal breath sounds. No wheezing or rales. Abdominal:      General: Bowel sounds are normal. There is no distension. Palpations: Abdomen is soft. Tenderness: There is no abdominal tenderness. Musculoskeletal:      Left hand: Swelling and tenderness present. Cervical back: Normal range of motion and neck supple. Right lower leg: Edema (trace pitting) present. Left lower leg: No edema. Skin:     General: Skin is warm and dry. Findings: No erythema or rash. Neurological:      Mental Status: She is alert and oriented to person, place, and time. Psychiatric:         Mood and Affect: Mood normal.         Behavior: Behavior normal.         Data:     Lab Results   Component Value Date     08/03/2020    K 4.2 08/03/2020     08/03/2020    CO2 25 08/03/2020    BUN 17 08/03/2020    CREATININE 0.82 08/03/2020    GLUCOSE 243 08/03/2020    PROT 7.7 10/17/2020    LABALBU 4.6 10/17/2020    BILITOT 0.45 10/17/2020    ALKPHOS 65 10/17/2020    AST 44 10/17/2020    ALT 55 10/17/2020     Lab Results   Component Value Date    WBC 7.6 08/03/2020    RBC 5.30 08/03/2020    HGB 14.6 08/03/2020    HCT 46.8 08/03/2020    MCV 88.3 08/03/2020    MCH 27.5 08/03/2020    MCHC 31.2 08/03/2020    RDW 13.2 08/03/2020     08/03/2020    MPV 9.9 08/03/2020     Lab Results   Component Value Date    TSH 1.78 07/18/2016     Lab Results   Component Value Date    CHOL 218 10/17/2020    CHOL 271 11/10/2017    HDL 43 10/17/2020    LABA1C 6.5 02/03/2021       Assessment/Plan:      Diagnosis Orders   1. Lower leg edema     2. Localized osteoarthritis of hand, left  meloxicam (MOBIC) 15 MG tablet     We will stop pioglitazone as I think this is contributing to her lower leg edema. There is very minimal edema if any today. Continue hydrochlorothiazide daily. Meloxicam as needed for hand arthritis until definitive care per Ortho. 1.  Wendy received counseling on the following healthy behaviors: nutrition, exercise and medication adherence  2. Patient given educational materials - see patient instructions  3. Was a self-tracking handout given in paper form or via [employer]? No  If yes, see orders or list here. 4.  Discussed use, benefit, and side effects of prescribed medications. Barriers to medication compliance addressed. All patient questions answered. Pt voiced understanding. 5.  Reviewed prior labs and health maintenance  6. Continue current medications, diet and exercise. Completed Refills   Requested Prescriptions     Signed Prescriptions Disp Refills    meloxicam (MOBIC) 15 MG tablet 90 tablet 0     Sig: Take 1 tablet by mouth daily         Return if symptoms worsen or fail to improve.

## 2021-05-25 NOTE — PATIENT INSTRUCTIONS
SURVEY:    You may be receiving a survey from [employer] regarding your visit today. Please complete the survey to enable us to provide the highest quality of care to you and your family. If you cannot score us a very good on any question, please call the office to discuss how we could have made your experience a very good one. Thank you. Everett Dumont, APRN-JACQUI Song, APRN-CNP  Awilda Carrasco, BYRON Rabago, BYRON Cooper, Ibrahima Robertson, [facility]    Patient Education        Leg and Ankle Edema: Care Instructions  Your Care Instructions  Swelling in the legs, ankles, and feet is called edema. It is common after you sit or stand for a while. Long plane flights or car rides often cause swelling in the legs and feet. You may also have swelling if you have to stand for long periods of time at your job. Problems with the veins in the legs (varicose veins) and changes in hormones can also cause swelling. Sometimes the swelling in the ankles and feet is caused by a more serious problem, such as heart failure, infection, blood clots, or liver or kidney disease. Follow-up care is a key part of your treatment and safety. Be sure to make and go to all appointments, and call your doctor if you are having problems. It's also a good idea to know your test results and keep a list of the medicines you take. How can you care for yourself at home? · If your doctor gave you medicine, take it as prescribed. Call your doctor if you think you are having a problem with your medicine. · Whenever you are resting, raise your legs up. Try to keep the swollen area higher than the level of your heart. · Take breaks from standing or sitting in one position. ? Walk around to increase the blood flow in your lower legs. ? Move your feet and ankles often while you stand, or tighten and relax your leg muscles. · Wear support stockings. Put them on in the morning, before swelling gets worse. · Eat a balanced diet.  Lose weight if you need to.  · Limit the amount of salt (sodium) in your diet. Salt holds fluid in the body and may increase swelling. When should you call for help? Call 911 anytime you think you may need emergency care. For example, call if:    · You have symptoms of a blood clot in your lung (called a pulmonary embolism). These may include:  ? Sudden chest pain. ? Trouble breathing. ? Coughing up blood. Call your doctor now or seek immediate medical care if:    · You have signs of a blood clot, such as:  ? Pain in your calf, back of the knee, thigh, or groin. ? Redness and swelling in your leg or groin.     · You have symptoms of infection, such as:  ? Increased pain, swelling, warmth, or redness. ? Red streaks or pus. ? A fever. Watch closely for changes in your health, and be sure to contact your doctor if:    · Your swelling is getting worse.     · You have new or worsening pain in your legs.     · You do not get better as expected. Where can you learn more? Go to https://[employer]. org and sign in to your [employer] account. Enter A999 in the [employer] box to learn more about \"Leg and Ankle Edema: Care Instructions. \"     If you do not have an account, please click on the \"Sign Up Now\" link. Current as of: February 26, 2020               Content Version: 12.8  © 9267-2802 Healthwise, Incorporated. Care instructions adapted under license by [facility] ([facility]). If you have questions about a medical condition or this instruction, always ask your healthcare professional. [place] [address] any warranty or liability for your use of this information.

## 2021-08-20 ENCOUNTER — HOSPITAL ENCOUNTER (OUTPATIENT)
Age: 61
Discharge: HOME | End: 2021-08-20
Payer: COMMERCIAL

## 2021-08-20 DIAGNOSIS — E11.9 CONTROLLED TYPE 2 DIABETES MELLITUS WITHOUT COMPLICATION, WITHOUT LONG-TERM CURRENT USE OF INSULIN (HCC): ICD-10-CM

## 2021-08-20 LAB
ABSOLUTE EOS #: 0.29 K/UL (ref 0–0.44)
ABSOLUTE IMMATURE GRANULOCYTE: 0.05 K/UL (ref 0–0.3)
ABSOLUTE LYMPH #: 1.4 K/UL (ref 1.1–3.7)
ABSOLUTE MONO #: 0.58 K/UL (ref 0.1–1.2)
ALT SERPL-CCNC: 30 U/L (ref 5–33)
ANION GAP SERPL CALCULATED.3IONS-SCNC: 15 MMOL/L (ref 9–17)
AST SERPL-CCNC: 29 U/L
BASOPHILS # BLD: 1 % (ref 0–2)
BASOPHILS ABSOLUTE: 0.08 K/UL (ref 0–0.2)
BUN BLDV-MCNC: 29 MG/DL (ref 8–23)
BUN/CREAT BLD: 31 (ref 9–20)
CALCIUM SERPL-MCNC: 10.4 MG/DL (ref 8.6–10.4)
CHLORIDE BLD-SCNC: 98 MMOL/L (ref 98–107)
CHOLESTEROL/HDL RATIO: 4.5
CHOLESTEROL: 245 MG/DL
CO2: 23 MMOL/L (ref 20–31)
CREAT SERPL-MCNC: 0.94 MG/DL (ref 0.5–0.9)
CREATININE URINE: 106.8 MG/DL (ref 28–217)
DIFFERENTIAL TYPE: ABNORMAL
EOSINOPHILS RELATIVE PERCENT: 3 % (ref 1–4)
GFR AFRICAN AMERICAN: >60 ML/MIN
GFR NON-AFRICAN AMERICAN: >60 ML/MIN
GFR SERPL CREATININE-BSD FRML MDRD: ABNORMAL ML/MIN/{1.73_M2}
GFR SERPL CREATININE-BSD FRML MDRD: ABNORMAL ML/MIN/{1.73_M2}
GLUCOSE BLD-MCNC: 154 MG/DL (ref 70–99)
HCT VFR BLD CALC: 41.3 % (ref 36.3–47.1)
HDLC SERPL-MCNC: 54 MG/DL
HEMOGLOBIN: 13.1 G/DL (ref 11.9–15.1)
IMMATURE GRANULOCYTES: 1 %
LDL CHOLESTEROL: 157 MG/DL (ref 0–130)
LYMPHOCYTES # BLD: 14 % (ref 24–43)
MCH RBC QN AUTO: 28.5 PG (ref 25.2–33.5)
MCHC RBC AUTO-ENTMCNC: 31.7 G/DL (ref 28.4–34.8)
MCV RBC AUTO: 89.8 FL (ref 82.6–102.9)
MICROALBUMIN/CREAT 24H UR: 21 MG/L
MICROALBUMIN/CREAT UR-RTO: 20 MCG/MG CREAT
MONOCYTES # BLD: 6 % (ref 3–12)
NRBC AUTOMATED: 0 PER 100 WBC
PDW BLD-RTO: 13.8 % (ref 11.8–14.4)
PLATELET # BLD: 287 K/UL (ref 138–453)
PLATELET ESTIMATE: ABNORMAL
PMV BLD AUTO: 9.6 FL (ref 8.1–13.5)
POTASSIUM SERPL-SCNC: 4.2 MMOL/L (ref 3.7–5.3)
RBC # BLD: 4.6 M/UL (ref 3.95–5.11)
RBC # BLD: ABNORMAL 10*6/UL
SEG NEUTROPHILS: 75 % (ref 36–65)
SEGMENTED NEUTROPHILS ABSOLUTE COUNT: 7.52 K/UL (ref 1.5–8.1)
SODIUM BLD-SCNC: 136 MMOL/L (ref 135–144)
TRIGL SERPL-MCNC: 170 MG/DL
VLDLC SERPL CALC-MCNC: ABNORMAL MG/DL (ref 1–30)
WBC # BLD: 9.9 K/UL (ref 3.5–11.3)
WBC # BLD: ABNORMAL 10*3/UL

## 2021-08-20 PROCEDURE — 80061 LIPID PANEL: CPT

## 2021-08-20 PROCEDURE — 80048 BASIC METABOLIC PNL TOTAL CA: CPT

## 2021-08-20 PROCEDURE — 82043 UR ALBUMIN QUANTITATIVE: CPT

## 2021-08-20 PROCEDURE — 83036 HEMOGLOBIN GLYCOSYLATED A1C: CPT

## 2021-08-20 PROCEDURE — 84460 ALANINE AMINO (ALT) (SGPT): CPT

## 2021-08-20 PROCEDURE — 85025 COMPLETE CBC W/AUTO DIFF WBC: CPT

## 2021-08-20 PROCEDURE — 84450 TRANSFERASE (AST) (SGOT): CPT

## 2021-08-20 PROCEDURE — 36415 COLL VENOUS BLD VENIPUNCTURE: CPT

## 2021-08-20 PROCEDURE — 82570 ASSAY OF URINE CREATININE: CPT

## 2021-08-22 LAB
ESTIMATED AVERAGE GLUCOSE: 154 MG/DL
HBA1C MFR BLD: 7 % (ref 4–6)

## 2021-08-30 ENCOUNTER — OFFICE VISIT (OUTPATIENT)
Dept: PRIMARY CARE CLINIC | Age: 61
End: 2021-08-30
Payer: COMMERCIAL

## 2021-08-30 VITALS
DIASTOLIC BLOOD PRESSURE: 74 MMHG | SYSTOLIC BLOOD PRESSURE: 118 MMHG | TEMPERATURE: 97.1 F | HEART RATE: 68 BPM | WEIGHT: 215.3 LBS | RESPIRATION RATE: 22 BRPM | OXYGEN SATURATION: 99 % | BODY MASS INDEX: 39.38 KG/M2

## 2021-08-30 DIAGNOSIS — I10 ESSENTIAL HYPERTENSION: ICD-10-CM

## 2021-08-30 DIAGNOSIS — E11.21 CONTROLLED TYPE 2 DIABETES MELLITUS WITH DIABETIC NEPHROPATHY, WITHOUT LONG-TERM CURRENT USE OF INSULIN (HCC): Primary | ICD-10-CM

## 2021-08-30 DIAGNOSIS — R60.0 LOWER LEG EDEMA: ICD-10-CM

## 2021-08-30 PROCEDURE — 99214 OFFICE O/P EST MOD 30 MIN: CPT | Performed by: NURSE PRACTITIONER

## 2021-08-30 PROCEDURE — 3051F HG A1C>EQUAL 7.0%<8.0%: CPT | Performed by: NURSE PRACTITIONER

## 2021-08-30 RX ORDER — FUROSEMIDE 20 MG/1
20 TABLET ORAL DAILY
Qty: 30 TABLET | Refills: 5 | Status: SHIPPED | OUTPATIENT
Start: 2021-08-30 | End: 2022-03-01 | Stop reason: SDUPTHER

## 2021-08-30 RX ORDER — MELOXICAM 15 MG/1
15 TABLET ORAL DAILY
Qty: 90 TABLET | Refills: 1 | Status: SHIPPED | OUTPATIENT
Start: 2021-08-30 | End: 2022-03-01 | Stop reason: SDUPTHER

## 2021-08-30 ASSESSMENT — ENCOUNTER SYMPTOMS
RHINORRHEA: 0
SHORTNESS OF BREATH: 0
SORE THROAT: 0
NAUSEA: 0
VISUAL CHANGE: 0
CONSTIPATION: 0
DIARRHEA: 0
ABDOMINAL PAIN: 0
COUGH: 0
VOMITING: 0
WHEEZING: 0

## 2021-08-30 NOTE — PROGRESS NOTES
Name: Darlyn Whitman  : 1960         Chief Complaint:     Chief Complaint   Patient presents with    Diabetes     routine check    Hypertension       History of Present Illness: Darlyn Whitman is a 57 y.o.  female who presents with Diabetes (routine check) and Hypertension      Angy Ma is here today for routine office visit. Lower leg edemastable, patient using hydrochlorothiazide daily with good result. However her her creatinine has very slightly increased. Would like her to stop hydrochlorothiazide and we will use Lasix as needed for lower leg edema. Diabetes  She presents for her follow-up diabetic visit. She has type 2 diabetes mellitus. Her disease course has been stable. There are no hypoglycemic associated symptoms. Pertinent negatives for hypoglycemia include no dizziness, headaches, mood changes, nervousness/anxiousness, sleepiness, speech difficulty or sweats. There are no diabetic associated symptoms. Pertinent negatives for diabetes include no chest pain, no fatigue, no polydipsia, no polyphagia, no polyuria and no visual change. There are no hypoglycemic complications. Pertinent negatives for hypoglycemia complications include no hospitalization and no nocturnal hypoglycemia. Symptoms are stable. There are no diabetic complications. Pertinent negatives for diabetic complications include no CVA, heart disease, nephropathy or peripheral neuropathy. Risk factors for coronary artery disease include diabetes mellitus, dyslipidemia, family history, obesity, hypertension, stress and post-menopausal. Current diabetic treatment includes oral agent (triple therapy). She is compliant with treatment all of the time. Her weight is stable. She is following a generally healthy diet. Meal planning includes avoidance of concentrated sweets. She has had a previous visit with a dietitian. She participates in exercise intermittently. Her home blood glucose trend is decreasing steadily.  Her breakfast blood glucose range is generally 140-180 mg/dl. An ACE inhibitor/angiotensin II receptor blocker is being taken. She does not see a podiatrist.Eye exam is current. Hypertension  This is a chronic problem. The current episode started more than 1 year ago. The problem is unchanged. The problem is controlled. Pertinent negatives include no chest pain, headaches, neck pain, palpitations, peripheral edema, shortness of breath or sweats. There are no associated agents to hypertension. Risk factors for coronary artery disease include diabetes mellitus, dyslipidemia, family history, obesity, post-menopausal state and stress. Past treatments include ACE inhibitors, calcium channel blockers and beta blockers. The current treatment provides moderate improvement. There are no compliance problems. There is no history of kidney disease, CAD/MI, CVA or heart failure. There is no history of chronic renal disease.          Past Medical History:     Past Medical History:   Diagnosis Date    Diabetes mellitus ([facility] [address]     Hepatitis A     Hyperlipidemia     Hypertension       Reviewed all health maintenance requirements and ordered appropriate tests  Health Maintenance Due   Topic Date Due    Shingles Vaccine (1 of 2) Never done       Past Surgical History:     Past Surgical History:   Procedure Laterality Date    APPENDECTOMY      ARM SURGERY Left 10/29/2020    lesion biopsy excision multiple lipomas    ARM SURGERY Bilateral 10/29/2020    ARM LESION BIOPSY EXCISION, MULTIPLE LIPOMAS performed by Gene Galvin DO at [facility] 2  10/23/2020    Dr. Eller Aschoff - polypectomy, hot biopsy    COLONOSCOPY N/A 10/23/2020    COLONOSCOPY POLYPECTOMY HOT BIOPSY performed by Gene Galvin DO at [address] [facility]  10/23/2020    COLONOSCOPY BIOPSY/STOMA performed by Gene Galvin DO at [address] [facility] ARTHROSCOPY Left 03/29/2021    SHOULDER ARTHROSCOPY, RCR, DEBRIDEMENT, SUBACROMIAL DECOMPRESSION,  BICEPS TENODESIS     SHOULDER ARTHROSCOPY Left 3/29/2021    SHOULDER ARTHROSCOPY, RCR, DEBRIDEMENT, SUBACROMIAL DECOMPRESSION,  BICEPS TENODESIS performed by Gabriel Lira MD at [address] [facility] ENDOSCOPY  12/04/2020    [facility]        Medications:       Prior to Admission medications    Medication Sig Start Date End Date Taking? Authorizing Provider   meloxicam (MOBIC) 15 MG tablet Take 1 tablet by mouth daily 8/30/21  Yes Althea Chance Might, APRN - CNP   furosemide (LASIX) 20 MG tablet Take 1 tablet by mouth daily 8/30/21  Yes Althea Chance Might, APRN - CNP   verapamil (CALAN SR) 120 MG extended release tablet Take 1 tablet by mouth once daily 4/13/21  Yes Althea Chance Might, APRN - CNP   labetalol (NORMODYNE) 100 MG tablet Take 1 tablet by mouth 2 times daily 4/13/21  Yes Althea Chance Might, APRN - CNP   lisinopril (PRINIVIL;ZESTRIL) 20 MG tablet Take 1 tablet by mouth daily 4/13/21  Yes Althea Chance Might, APRN - CNP   ezetimibe (ZETIA) 10 MG tablet Take 1 tablet by mouth once daily 1/6/21  Yes Althea Chance Might APRN - CNP   Semaglutide (RYBELSUS) 7 MG TABS Take 7 mg by mouth daily 10/19/20  Yes Althea Chance Might, APRN - CNP   pantoprazole (PROTONIX) 40 MG tablet TAKE 1 TABLET BY MOUTH ONCE DAILY FOR 30 DAYS  Patient not taking: Reported on 8/30/2021 12/18/20   GIOVANI De La Cruz CNP        Allergies:       Statins    Social History:     Tobacco:    reports that she has never smoked. She has never used smokeless tobacco.  Alcohol:      reports no history of alcohol use. Drug Use:  reports no history of drug use. Family History:     Family History   Problem Relation Age of Onset    Diabetes Father        Review of Systems:     Positive and Negative as described in HPI    Review of Systems   Constitutional: Negative for chills, fatigue and fever. HENT: Negative for congestion, rhinorrhea and sore throat. Eyes: Negative for visual disturbance. Respiratory: Negative for cough, shortness of breath and wheezing. Cardiovascular: Positive for leg swelling. Negative for chest pain and palpitations. Gastrointestinal: Negative for abdominal pain, constipation, diarrhea, nausea and vomiting. Endocrine: Negative for polydipsia, polyphagia and polyuria. Genitourinary: Negative for difficulty urinating and dysuria. Musculoskeletal: Positive for arthralgias. Negative for gait problem, neck pain and neck stiffness. Skin: Negative for rash. Neurological: Negative for dizziness, syncope, speech difficulty, light-headedness and headaches. Psychiatric/Behavioral: The patient is not nervous/anxious. Physical Exam:   Vitals:  /74 (Position: Sitting)   Pulse 68   Temp 97.1 °F (36.2 °C) (Temporal)   Resp 22   Wt 215 lb 4.8 oz (97.7 kg)   SpO2 99%   BMI 39.38 kg/m²     Physical Exam  Vitals and nursing note reviewed. Constitutional:       General: She is not in acute distress. Appearance: Normal appearance. She is well-developed. She is obese. She is not ill-appearing. HENT:      Mouth/Throat:      Mouth: Mucous membranes are moist.   Eyes:      Conjunctiva/sclera: Conjunctivae normal.   Cardiovascular:      Rate and Rhythm: Normal rate and regular rhythm. Heart sounds: No murmur heard. Pulmonary:      Effort: Pulmonary effort is normal.      Breath sounds: Normal breath sounds. No wheezing. Abdominal:      General: Bowel sounds are normal. There is no distension. Palpations: Abdomen is soft. Tenderness: There is no abdominal tenderness. Musculoskeletal:      Left shoulder: Decreased range of motion. Cervical back: Normal range of motion and neck supple. Right lower leg: No edema. Left lower leg: No edema. Lymphadenopathy:      Cervical: No cervical adenopathy. Skin:     General: Skin is warm and dry. Findings: No rash.    Neurological:      Mental Status: She is alert and oriented to health maintenance  6. Continue current medications, diet and exercise. Completed Refills   Requested Prescriptions     Signed Prescriptions Disp Refills    meloxicam (MOBIC) 15 MG tablet 90 tablet 1     Sig: Take 1 tablet by mouth daily    furosemide (LASIX) 20 MG tablet 30 tablet 5     Sig: Take 1 tablet by mouth daily         Return in about 6 months (around 2/28/2022) for Check up.

## 2021-08-30 NOTE — PATIENT INSTRUCTIONS
SURVEY:    You may be receiving a survey from [employer] regarding your visit today. Please complete the survey to enable us to provide the highest quality of care to you and your family. If you cannot score us a very good on any question, please call the office to discuss how we could have made your experience a very good one. Thank you. Khris Dumont, APRN-CNP  Virgilio Song, APRN-CNP  Shaila Rodriguez, LPYOLETTE Douglas, LPYOLETTE Sanders, [place]  Sung Christy, PCA    Patient Education        Counting Carbohydrates for Diabetes: Care Instructions  Your Care Instructions     You don't have to eat special foods when you have diabetes. You just have to be careful to eat healthy foods. Carbohydrates (carbs) raise blood sugar higher and quicker than any other nutrient. Carbs are found in desserts, breads and cereals, and fruit. They're also in starchy vegetables. These include potatoes, corn, and grains such as rice and pasta. Carbs are also in milk and yogurt. The more carbs you eat at one time, the higher your blood sugar will rise. Spreading carbs all through the day helps keep your blood sugar levels within your target range. Counting carbs is one of the best ways to keep your blood sugar under control. If you use insulin, counting carbs helps you match the right amount of insulin to the number of grams of carbs in a meal. Then you can change your diet and insulin dose as needed. Testing your blood sugar several times a day can help you learn how carbs affect your blood sugar. A registered dietitian or certified diabetes educator can help you plan meals and snacks. Follow-up care is a key part of your treatment and safety. Be sure to make and go to all appointments, and call your doctor if you are having problems. It's also a good idea to know your test results and keep a list of the medicines you take. How can you care for yourself at home?   Know your daily amount of carbohydrates  Your daily amount depends on several things, such as your weight, how active you are, which diabetes medicines you take, and what your goals are for your blood sugar levels. A registered dietitian or certified diabetes educator can help you plan how many carbs to include in each meal and snack. For most adults, a guideline for the daily amount of carbs is:  · 45 to 60 grams at each meal. That's about the same as 3 to 4 carbohydrate servings. · 15 to 20 grams at each snack. That's about the same as 1 carbohydrate serving. Count carbs  Counting carbs lets you know how much rapid-acting insulin to take before you eat. If you use an insulin pump, you get a constant rate of insulin during the day. So the pump must be programmed at meals. This gives you extra insulin to cover the rise in blood sugar after meals. If you take insulin:  · Learn your own insulin-to-carb ratio. You and your diabetes health professional will figure out the ratio. You can do this by testing your blood sugar after meals. For example, you may need a certain amount of insulin for every 15 grams of carbs. · Add up the carb grams in a meal. Then you can figure out how many units of insulin to take based on your insulin-to-carb ratio. · Exercise lowers blood sugar. You can use less insulin than you would if you were not doing exercise. Keep in mind that timing matters. If you exercise within 1 hour after a meal, your body may need less insulin for that meal than it would if you exercised 3 hours after the meal. Test your blood sugar to find out how exercise affects your need for insulin. If you do or don't take insulin:  · Look at labels on packaged foods. This can tell you how many carbs are in a serving. You can also use guides from the American Diabetes Association. · Be aware of portions, or serving sizes. If a package has two servings and you eat the whole package, you need to double the number of grams of carbohydrate listed for one serving.   · Protein, fat, and fiber do not raise blood sugar as much as carbs do. If you eat a lot of these nutrients in a meal, your blood sugar will rise more slowly than it would otherwise. Eat from all food groups  · Eat at least three meals a day. · Plan meals to include food from all the food groups. The food groups include grains, fruits, dairy, proteins, and vegetables. · Talk to your dietitian or diabetes educator about ways to add limited amounts of sweets into your meal plan. · If you drink alcohol, talk to your doctor. It may not be recommended when you are taking certain diabetes medicines. Where can you learn more? Go to https://Dromadaire.compepiceweb.LUBB-TEX. org and sign in to your [employer] account. Enter I339 in the Zuvvu box to learn more about \"Counting Carbohydrates for Diabetes: Care Instructions. \"     If you do not have an account, please click on the \"Sign Up Now\" link. Current as of: August 31, 2020               Content Version: 12.9  © 2006-2021 Healthwise, Incorporated. Care instructions adapted under license by [facility] ([facility]). If you have questions about a medical condition or this instruction, always ask your healthcare professional. [place] [address] any warranty or liability for your use of this information.

## 2021-09-20 DIAGNOSIS — I10 ESSENTIAL HYPERTENSION: ICD-10-CM

## 2021-09-20 RX ORDER — PANTOPRAZOLE SODIUM 40 MG/1
TABLET, DELAYED RELEASE ORAL
Qty: 90 TABLET | Refills: 1 | Status: SHIPPED | OUTPATIENT
Start: 2021-09-20 | End: 2022-06-01 | Stop reason: SDUPTHER

## 2021-09-20 RX ORDER — LABETALOL 100 MG/1
100 TABLET, FILM COATED ORAL 2 TIMES DAILY
Qty: 180 TABLET | Refills: 1 | Status: SHIPPED | OUTPATIENT
Start: 2021-09-20 | End: 2022-03-01 | Stop reason: SDUPTHER

## 2021-09-20 RX ORDER — LISINOPRIL 20 MG/1
20 TABLET ORAL DAILY
Qty: 90 TABLET | Refills: 1 | Status: SHIPPED | OUTPATIENT
Start: 2021-09-20 | End: 2022-03-01 | Stop reason: SDUPTHER

## 2021-09-20 NOTE — TELEPHONE ENCOUNTER
Phone call from patient requesting refills to be sent to [facility]. Health Maintenance   Topic Date Due    Shingles Vaccine (1 of 2) Never done    Flu vaccine (1) 09/01/2021    Diabetic retinal exam  09/09/2022 (Originally 8/8/2018)    Colon cancer screen colonoscopy  10/23/2021    A1C test (Diabetic or Prediabetic)  08/20/2022    Diabetic microalbuminuria test  08/20/2022    Lipid screen  08/20/2022    Potassium monitoring  08/20/2022    Creatinine monitoring  08/20/2022    Diabetic foot exam  08/30/2022    Breast cancer screen  08/31/2022    Pneumococcal 0-64 years Vaccine (2 of 2 - PPSV23) 07/05/2024    DTaP/Tdap/Td vaccine (2 - Td or Tdap) 02/14/2025    COVID-19 Vaccine  Completed    Hepatitis C screen  Completed    HIV screen  Completed    Hepatitis A vaccine  Aged Out    Hib vaccine  Aged Out    Meningococcal (ACWY) vaccine  Aged Out             (applicable per patient's age: Cancer Screenings, Depression Screening, Fall Risk Screening, Immunizations)    Hemoglobin A1C (%)   Date Value   08/20/2021 7.0 (H)   02/03/2021 6.5   10/19/2020 8.6     Microalb/Crt.  Ratio (mcg/mg creat)   Date Value   08/20/2021 20     LDL Cholesterol (mg/dL)   Date Value   08/20/2021 157 (H)     LDL Calculated (mg/dL)   Date Value   11/10/2017 175 (A)     AST (U/L)   Date Value   08/20/2021 29     ALT (U/L)   Date Value   08/20/2021 30     BUN (mg/dL)   Date Value   08/20/2021 29 (H)      (goal A1C is < 7)   (goal LDL is <100) need 30-50% reduction from baseline     BP Readings from Last 3 Encounters:   08/30/21 118/74   05/25/21 118/62   04/29/21 124/78    (goal /80)      All Future Testing planned in [facility]:  Lab Frequency Next Occurrence   Basic Metabolic Panel Once 91/14/7607   Hemoglobin A1C Once 02/26/2022       Next Visit Date:  Future Appointments   Date Time Provider Abdullahi Harding   3/1/2022  9:20 AM Maria Elena Dumont, APRN - CNP [facility]            Patient Active Problem List: Gastroenteritis, acute     Diabetes mellitus type 2 with complications, uncontrolled ([facility] [address]     Dyslipidemia     Essential hypertension     Uncontrolled type 2 diabetes mellitus with hyperglycemia (HCC)     Positive FIT (fecal immunochemical test)     Acute nonintractable headache     Lipoma of left upper extremity

## 2021-12-18 DIAGNOSIS — E78.5 DYSLIPIDEMIA: ICD-10-CM

## 2021-12-20 RX ORDER — EZETIMIBE 10 MG/1
TABLET ORAL
Qty: 90 TABLET | Refills: 0 | Status: SHIPPED | OUTPATIENT
Start: 2021-12-20 | End: 2022-06-01 | Stop reason: SDUPTHER

## 2021-12-20 NOTE — TELEPHONE ENCOUNTER
mellitus type 2 with complications, uncontrolled ([facility] [address]     Dyslipidemia     Essential hypertension     Uncontrolled type 2 diabetes mellitus with hyperglycemia (HCC)     Positive FIT (fecal immunochemical test)     Acute nonintractable headache     Lipoma of left upper extremity

## 2022-01-20 NOTE — TELEPHONE ENCOUNTER
We do not have the 7 mg tablets available for sample. However she could go back to 3 mg until she receives insurance. Have her stop in the office to get samples. Thank you.

## 2022-01-20 NOTE — TELEPHONE ENCOUNTER
Phone call from patient stating that she takes Rybelsus 7mg but she lost her job and insurance last month and she will not have insurance until February. States she cannot afford to buy Rybelsus and would like to know if we had any samples or what she should do until she gets insurance. Please advise. Health Maintenance   Topic Date Due    Shingles Vaccine (1 of 2) Never done    Flu vaccine (1) 09/01/2021    COVID-19 Vaccine (3 - Booster for Moderna series) 10/09/2021    Colon cancer screen colonoscopy  10/23/2021    Depression Screen  02/03/2022    Diabetic retinal exam  09/09/2022 (Originally 8/8/2018)    A1C test (Diabetic or Prediabetic)  08/20/2022    Diabetic microalbuminuria test  08/20/2022    Lipid screen  08/20/2022    Potassium monitoring  08/20/2022    Creatinine monitoring  08/20/2022    Diabetic foot exam  08/30/2022    Breast cancer screen  08/31/2022    Pneumococcal 0-64 years Vaccine (2 of 2 - PPSV23) 07/05/2024    DTaP/Tdap/Td vaccine (2 - Td or Tdap) 02/14/2025    Hepatitis C screen  Completed    HIV screen  Completed    Hepatitis A vaccine  Aged Out    Hib vaccine  Aged Out    Meningococcal (ACWY) vaccine  Aged Out             (applicable per patient's age: Cancer Screenings, Depression Screening, Fall Risk Screening, Immunizations)    Hemoglobin A1C (%)   Date Value   08/20/2021 7.0 (H)   02/03/2021 6.5   10/19/2020 8.6     Microalb/Crt.  Ratio (mcg/mg creat)   Date Value   08/20/2021 20     LDL Cholesterol (mg/dL)   Date Value   08/20/2021 157 (H)     LDL Calculated (mg/dL)   Date Value   11/10/2017 175 (A)     AST (U/L)   Date Value   08/20/2021 29     ALT (U/L)   Date Value   08/20/2021 30     BUN (mg/dL)   Date Value   08/20/2021 29 (H)      (goal A1C is < 7)   (goal LDL is <100) need 30-50% reduction from baseline     BP Readings from Last 3 Encounters:   08/30/21 118/74   05/25/21 118/62   04/29/21 124/78    (goal /80)      All Future Testing planned in [facility]:  Lab Frequency Next Occurrence   Basic Metabolic Panel Once 73/27/3139   Hemoglobin A1C Once 02/26/2022       Next Visit Date:  Future Appointments   Date Time Provider Abdullahi Harding   3/1/2022  9:20 AM GIOVANI Chapa - CNP [facility]            Patient Active Problem List:     Gastroenteritis, acute     Diabetes mellitus type 2 with complications, uncontrolled ([address]     Dyslipidemia     Essential hypertension     Uncontrolled type 2 diabetes mellitus with hyperglycemia ([facility] [address]     Positive FIT (fecal immunochemical test)     Acute nonintractable headache     Lipoma of left upper extremity

## 2022-01-21 RX ORDER — ORAL SEMAGLUTIDE 3 MG/1
1 TABLET ORAL DAILY
Qty: 30 TABLET | Refills: 0 | Status: SHIPPED | OUTPATIENT
Start: 2022-01-21 | End: 2022-02-20

## 2022-01-21 NOTE — TELEPHONE ENCOUNTER
Called and spoke with patient and informed her that we do not have Rybelsis 7mg samples in the office but we do carry the Rybelsus 3 mg samples in the office and Minesh Ortega said she could go back on that until she gets her insurance next month.

## 2022-03-01 ENCOUNTER — OFFICE VISIT (OUTPATIENT)
Dept: PRIMARY CARE CLINIC | Age: 62
End: 2022-03-01
Payer: COMMERCIAL

## 2022-03-01 VITALS
OXYGEN SATURATION: 97 % | HEIGHT: 62 IN | TEMPERATURE: 98.7 F | HEART RATE: 78 BPM | SYSTOLIC BLOOD PRESSURE: 130 MMHG | DIASTOLIC BLOOD PRESSURE: 84 MMHG | RESPIRATION RATE: 22 BRPM | BODY MASS INDEX: 42.58 KG/M2 | WEIGHT: 231.4 LBS

## 2022-03-01 DIAGNOSIS — Z12.31 OTHER SCREENING MAMMOGRAM: ICD-10-CM

## 2022-03-01 DIAGNOSIS — I10 ESSENTIAL HYPERTENSION: ICD-10-CM

## 2022-03-01 DIAGNOSIS — E11.65 UNCONTROLLED TYPE 2 DIABETES MELLITUS WITH HYPERGLYCEMIA (HCC): Primary | ICD-10-CM

## 2022-03-01 DIAGNOSIS — R60.0 LOWER LEG EDEMA: ICD-10-CM

## 2022-03-01 PROCEDURE — G8482 FLU IMMUNIZE ORDER/ADMIN: HCPCS | Performed by: NURSE PRACTITIONER

## 2022-03-01 PROCEDURE — G8427 DOCREV CUR MEDS BY ELIG CLIN: HCPCS | Performed by: NURSE PRACTITIONER

## 2022-03-01 PROCEDURE — 3017F COLORECTAL CA SCREEN DOC REV: CPT | Performed by: NURSE PRACTITIONER

## 2022-03-01 PROCEDURE — 99214 OFFICE O/P EST MOD 30 MIN: CPT | Performed by: NURSE PRACTITIONER

## 2022-03-01 PROCEDURE — 1036F TOBACCO NON-USER: CPT | Performed by: NURSE PRACTITIONER

## 2022-03-01 PROCEDURE — 3046F HEMOGLOBIN A1C LEVEL >9.0%: CPT | Performed by: NURSE PRACTITIONER

## 2022-03-01 PROCEDURE — G8417 CALC BMI ABV UP PARAM F/U: HCPCS | Performed by: NURSE PRACTITIONER

## 2022-03-01 PROCEDURE — 83036 HEMOGLOBIN GLYCOSYLATED A1C: CPT | Performed by: NURSE PRACTITIONER

## 2022-03-01 PROCEDURE — 2022F DILAT RTA XM EVC RTNOPTHY: CPT | Performed by: NURSE PRACTITIONER

## 2022-03-01 RX ORDER — LABETALOL 100 MG/1
50 TABLET, FILM COATED ORAL 2 TIMES DAILY
Qty: 90 TABLET | Refills: 3 | Status: SHIPPED | OUTPATIENT
Start: 2022-03-01

## 2022-03-01 RX ORDER — LISINOPRIL 20 MG/1
20 TABLET ORAL DAILY
Qty: 90 TABLET | Refills: 3 | Status: SHIPPED | OUTPATIENT
Start: 2022-03-01

## 2022-03-01 RX ORDER — FUROSEMIDE 20 MG/1
20 TABLET ORAL DAILY
Qty: 90 TABLET | Refills: 3 | Status: SHIPPED | OUTPATIENT
Start: 2022-03-01

## 2022-03-01 RX ORDER — PIOGLITAZONEHYDROCHLORIDE 30 MG/1
30 TABLET ORAL DAILY
Qty: 30 TABLET | Refills: 3 | Status: SHIPPED | OUTPATIENT
Start: 2022-03-01 | End: 2022-06-27

## 2022-03-01 RX ORDER — MELOXICAM 15 MG/1
15 TABLET ORAL DAILY
Qty: 90 TABLET | Refills: 3 | Status: SHIPPED | OUTPATIENT
Start: 2022-03-01

## 2022-03-01 RX ORDER — GLIPIZIDE 5 MG/1
5 TABLET, FILM COATED, EXTENDED RELEASE ORAL DAILY
Qty: 30 TABLET | Refills: 3 | Status: SHIPPED | OUTPATIENT
Start: 2022-03-01 | End: 2022-06-27

## 2022-03-01 ASSESSMENT — PATIENT HEALTH QUESTIONNAIRE - PHQ9
SUM OF ALL RESPONSES TO PHQ QUESTIONS 1-9: 0
2. FEELING DOWN, DEPRESSED OR HOPELESS: 0
SUM OF ALL RESPONSES TO PHQ QUESTIONS 1-9: 0
1. LITTLE INTEREST OR PLEASURE IN DOING THINGS: 0
SUM OF ALL RESPONSES TO PHQ QUESTIONS 1-9: 0
SUM OF ALL RESPONSES TO PHQ QUESTIONS 1-9: 0
SUM OF ALL RESPONSES TO PHQ9 QUESTIONS 1 & 2: 0

## 2022-03-01 ASSESSMENT — ENCOUNTER SYMPTOMS
NAUSEA: 0
COUGH: 0
ABDOMINAL PAIN: 0
CONSTIPATION: 0
VOMITING: 0
SHORTNESS OF BREATH: 0
DIARRHEA: 0
SORE THROAT: 0
WHEEZING: 0
VISUAL CHANGE: 0
RHINORRHEA: 0

## 2022-03-01 NOTE — PATIENT INSTRUCTIONS
SURVEY:     You may be receiving a survey from [employer] regarding your visit today. Please complete the survey to enable us to provide the highest quality of care to you and your family. If you cannot score us a very good on any question, please call the office to discuss how we could have made your experience a very good one. Thank you. Norma Dumont, APRN-JACQUI  Ramila Adan, CNP  Piper Wheatley, N  [facility], [facility]  Jignesh Kim, [facility]  Izabella Gaitan, [facility]  Tatiana, CMA  Marina, PCA    Patient Education        Counting Carbohydrates for Diabetes: Care Instructions  Your Care Instructions     You don't have to eat special foods when you have diabetes. You just have to be careful to eat healthy foods. Carbohydrates (carbs) raise blood sugar higher and quicker than any other nutrient. Carbs are found in desserts, breads and cereals, and fruit. They're also in starchy vegetables. These include potatoes, corn, and grains such as rice and pasta. Carbs are also in milk and yogurt. The more carbs you eat at one time, the higher your blood sugar will rise. Spreading carbs all through the day helps keep your blood sugar levels within your target range. Counting carbs is one of the best ways to keep your blood sugar under control. If you use insulin, counting carbs helps you match the right amount of insulin to the number of grams of carbs in a meal. Then you can change your diet and insulin dose as needed. Testing your blood sugar several times a day can help you learn how carbs affect your blood sugar. A registered dietitian or certified diabetes educator can help you plan meals and snacks. Follow-up care is a key part of your treatment and safety. Be sure to make and go to all appointments, and call your doctor if you are having problems. It's also a good idea to know your test results and keep a list of the medicines you take. How can you care for yourself at home?   Know your daily amount of carbohydrates  Your daily amount depends on several things, such as your weight, how active you are, which diabetes medicines you take, and what your goals are for your blood sugar levels. A registered dietitian or certified diabetes educator can help you plan how many carbs to include in each meal and snack. For most adults, a guideline for the daily amount of carbs is:  · 45 to 60 grams at each meal. That's about the same as 3 to 4 carbohydrate servings. · 15 to 20 grams at each snack. That's about the same as 1 carbohydrate serving. Count carbs  Counting carbs lets you know how much rapid-acting insulin to take before you eat. If you use an insulin pump, you get a constant rate of insulin during the day. So the pump must be programmed at meals. This gives you extra insulin to cover the rise in blood sugar after meals. If you take insulin:  · Learn your own insulin-to-carb ratio. You and your diabetes health professional will figure out the ratio. You can do this by testing your blood sugar after meals. For example, you may need a certain amount of insulin for every 15 grams of carbs. · Add up the carb grams in a meal. Then you can figure out how many units of insulin to take based on your insulin-to-carb ratio. · Exercise lowers blood sugar. You can use less insulin than you would if you were not doing exercise. Keep in mind that timing matters. If you exercise within 1 hour after a meal, your body may need less insulin for that meal than it would if you exercised 3 hours after the meal. Test your blood sugar to find out how exercise affects your need for insulin. If you do or don't take insulin:  · Look at labels on packaged foods. This can tell you how many carbs are in a serving. You can also use guides from the American Diabetes Association. · Be aware of portions, or serving sizes. If a package has two servings and you eat the whole package, you need to double the number of grams of carbohydrate listed for one serving.   · Protein, fat, and fiber do not raise blood sugar as much as carbs do. If you eat a lot of these nutrients in a meal, your blood sugar will rise more slowly than it would otherwise. Eat from all food groups  · Eat at least three meals a day. · Plan meals to include food from all the food groups. The food groups include grains, fruits, dairy, proteins, and vegetables. · Talk to your dietitian or diabetes educator about ways to add limited amounts of sweets into your meal plan. · If you drink alcohol, talk to your doctor. It may not be recommended when you are taking certain diabetes medicines. Where can you learn more? Go to https://SuvacopeYeHiveeb.GetQuik. org and sign in to your [employer] account. Enter U046 in the [employer] box to learn more about \"Counting Carbohydrates for Diabetes: Care Instructions. \"     If you do not have an account, please click on the \"Sign Up Now\" link. Current as of: July 28, 2021               Content Version: 13.1  © 2006-2021 Healthwise, Incorporated. Care instructions adapted under license by [facility] ([facility]). If you have questions about a medical condition or this instruction, always ask your healthcare professional. [place] [address] any warranty or liability for your use of this information.

## 2022-03-01 NOTE — PROGRESS NOTES
Name: Rose Mary Lyles  : 1960         Chief Complaint:     Chief Complaint   Patient presents with    Diabetes     routine check    Hypertension    Arthritis     Pain all over, seeing Dr. Manas Black       History of Present Illness: Rose Mary Lyles is a 57 y.o.  female who presents with Diabetes (routine check), Hypertension, and Arthritis (Pain all over, seeing Dr. Manas Black)      Tanner Kowalski is here today for routine office visit. DM-worsening, unfortunately patient has been without medication due to an insurance issue. She has been taking no medications for her diabetes. See below for further comment. Lower leg edema-stable, furosemide working well. Patient states some days when she is on her feet longer she does have increased swelling but she does rest and put her feet up with resolution. Diabetes  She presents for her follow-up diabetic visit. She has type 2 diabetes mellitus. Her disease course has been worsening. There are no hypoglycemic associated symptoms. Pertinent negatives for hypoglycemia include no dizziness, headaches, mood changes, nervousness/anxiousness, sleepiness, speech difficulty or sweats. Associated symptoms include polyphagia. Pertinent negatives for diabetes include no chest pain, no fatigue, no polydipsia, no polyuria and no visual change. There are no hypoglycemic complications. Pertinent negatives for hypoglycemia complications include no hospitalization and no nocturnal hypoglycemia. Symptoms are stable. There are no diabetic complications. Pertinent negatives for diabetic complications include no CVA, heart disease, nephropathy or peripheral neuropathy. Risk factors for coronary artery disease include diabetes mellitus, dyslipidemia, family history, obesity, hypertension, stress and post-menopausal. When asked about current treatments, none were reported. She is compliant with treatment all of the time. Her weight is stable. She is following a generally healthy diet. Meal planning includes avoidance of concentrated sweets. She has had a previous visit with a dietitian. She participates in exercise intermittently. Her home blood glucose trend is decreasing steadily. Her breakfast blood glucose range is generally >200 mg/dl. An ACE inhibitor/angiotensin II receptor blocker is being taken. She does not see a podiatrist.Eye exam is current. Hypertension  This is a chronic problem. The current episode started more than 1 year ago. The problem is unchanged. The problem is controlled. Pertinent negatives include no chest pain, headaches, neck pain, palpitations, peripheral edema, shortness of breath or sweats. There are no associated agents to hypertension. Risk factors for coronary artery disease include diabetes mellitus, dyslipidemia, family history, obesity, post-menopausal state and stress. Past treatments include ACE inhibitors, calcium channel blockers and beta blockers. The current treatment provides moderate improvement. There are no compliance problems. There is no history of kidney disease, CAD/MI, CVA or heart failure. There is no history of chronic renal disease.          Past Medical History:     Past Medical History:   Diagnosis Date    Diabetes mellitus ([facility] [address]     Hepatitis A     Hyperlipidemia     Hypertension       Reviewed all health maintenance requirements and ordered appropriate tests  Health Maintenance Due   Topic Date Due    Depression Screen  Never done       Past Surgical History:     Past Surgical History:   Procedure Laterality Date    APPENDECTOMY      ARM SURGERY Left 10/29/2020    lesion biopsy excision multiple lipomas    ARM SURGERY Bilateral 10/29/2020    ARM LESION BIOPSY EXCISION, MULTIPLE LIPOMAS performed by Abby Saucedo DO at [place] [address]  10/23/2020    Dr. Laura Callahan - polypectomy, hot biopsy    COLONOSCOPY N/A 10/23/2020    COLONOSCOPY POLYPECTOMY HOT BIOPSY performed by Abby Saucedo DO at [facility] OR    COLONOSCOPY  10/23/2020    COLONOSCOPY BIOPSY/STOMA performed by Ed Becker DO at [address] [address] [place] ARTHROSCOPY Left 03/29/2021    SHOULDER ARTHROSCOPY, RCR, DEBRIDEMENT, SUBACROMIAL DECOMPRESSION,  BICEPS TENODESIS     SHOULDER ARTHROSCOPY Left 3/29/2021    SHOULDER ARTHROSCOPY, RCR, DEBRIDEMENT, SUBACROMIAL DECOMPRESSION,  BICEPS TENODESIS performed by Drucie Barthel, MD at [address] [facility] ENDOSCOPY  12/04/2020    [place]osen-Dr. Sylvie Cranker        Medications:       Prior to Admission medications    Medication Sig Start Date End Date Taking? Authorizing Provider   verapamil (CALAN SR) 120 MG extended release tablet Take 1 tablet by mouth once daily 3/1/22  Yes GIOVANI Titus CNP   labetalol (NORMODYNE) 100 MG tablet Take 0.5 tablets by mouth 2 times daily 3/1/22  Yes GIOVANI Titus CNP   lisinopril (PRINIVIL;ZESTRIL) 20 MG tablet Take 1 tablet by mouth daily 3/1/22  Yes GIOVANI Titus CNP   meloxicam (MOBIC) 15 MG tablet Take 1 tablet by mouth daily 3/1/22  Yes GIOVANI Titus CNP   furosemide (LASIX) 20 MG tablet Take 1 tablet by mouth daily 3/1/22  Yes GIOVANI Titus CNP   glipiZIDE (GLUCOTROL XL) 5 MG extended release tablet Take 1 tablet by mouth daily 3/1/22  Yes GIOVANI Titus CNP   pioglitazone (ACTOS) 30 MG tablet Take 1 tablet by mouth daily 3/1/22  Yes GIOVANI Titus CNP   ezetimibe (ZETIA) 10 MG tablet Take 1 tablet by mouth once daily 12/20/21  Yes GIOVANI Titus CNP   pantoprazole (PROTONIX) 40 MG tablet TAKE 1 TABLET BY MOUTH ONCE DAILY  Patient not taking: Reported on 3/1/2022 9/20/21   GIOVANI Titus CNP        Allergies:       Statins    Social History:     Tobacco:    reports that she has never smoked. She has never used smokeless tobacco.  Alcohol:      reports no history of alcohol use. Drug Use:  reports no history of drug use.     Family History:     Family History   Problem Relation Age of Onset    Diabetes Father        Review of Systems:     Positive and Negative as described in HPI    Review of Systems   Constitutional: Negative for chills, fatigue and fever. HENT: Negative for congestion, rhinorrhea and sore throat. Eyes: Negative for visual disturbance. Respiratory: Negative for cough, shortness of breath and wheezing. Cardiovascular: Positive for leg swelling. Negative for chest pain and palpitations. Gastrointestinal: Negative for abdominal pain, constipation, diarrhea, nausea and vomiting. Endocrine: Positive for polyphagia. Negative for polydipsia and polyuria. Genitourinary: Negative for difficulty urinating and dysuria. Musculoskeletal: Positive for arthralgias. Negative for gait problem, neck pain and neck stiffness. Skin: Negative for rash. Neurological: Negative for dizziness, syncope, speech difficulty, light-headedness and headaches. Psychiatric/Behavioral: The patient is not nervous/anxious. Physical Exam:   Vitals:  /84 (Position: Sitting)   Pulse 78   Temp 98.7 °F (37.1 °C) (Temporal)   Resp 22   Ht 5' 2\" (1.575 m)   Wt 231 lb 6.4 oz (105 kg)   SpO2 97%   BMI 42.32 kg/m²     Physical Exam  Vitals and nursing note reviewed. Constitutional:       General: She is not in acute distress. Appearance: Normal appearance. She is well-developed. She is obese. She is not ill-appearing. HENT:      Mouth/Throat:      Mouth: Mucous membranes are moist.   Eyes:      Conjunctiva/sclera: Conjunctivae normal.   Cardiovascular:      Rate and Rhythm: Normal rate and regular rhythm. Heart sounds: No murmur heard. Pulmonary:      Effort: Pulmonary effort is normal.      Breath sounds: Normal breath sounds. No wheezing. Abdominal:      General: Bowel sounds are normal. There is no distension. Palpations: Abdomen is soft. Tenderness: There is no abdominal tenderness.    Musculoskeletal:      Left shoulder: Decreased range of motion. Cervical back: Normal range of motion and neck supple. Right lower leg: Edema (trace pitting) present. Left lower leg: Edema (trace pitting) present. Lymphadenopathy:      Cervical: No cervical adenopathy. Skin:     General: Skin is warm and dry. Findings: No rash. Neurological:      Mental Status: She is alert and oriented to person, place, and time. Psychiatric:         Mood and Affect: Mood normal.         Behavior: Behavior normal.         Data:     Lab Results   Component Value Date     08/20/2021    K 4.2 08/20/2021    CL 98 08/20/2021    CO2 23 08/20/2021    BUN 29 08/20/2021    CREATININE 0.94 08/20/2021    GLUCOSE 154 08/20/2021    PROT 7.7 10/17/2020    LABALBU 4.6 10/17/2020    BILITOT 0.45 10/17/2020    ALKPHOS 65 10/17/2020    AST 29 08/20/2021    ALT 30 08/20/2021     Lab Results   Component Value Date    WBC 9.9 08/20/2021    RBC 4.60 08/20/2021    HGB 13.1 08/20/2021    HCT 41.3 08/20/2021    MCV 89.8 08/20/2021    MCH 28.5 08/20/2021    MCHC 31.7 08/20/2021    RDW 13.8 08/20/2021     08/20/2021    MPV 9.6 08/20/2021     Lab Results   Component Value Date    TSH 1.78 07/18/2016     Lab Results   Component Value Date    CHOL 245 08/20/2021    CHOL 271 11/10/2017    HDL 54 08/20/2021    LABA1C 7.0 08/20/2021       Assessment/Plan:      Diagnosis Orders   1. Uncontrolled type 2 diabetes mellitus with hyperglycemia (HCC)  POCT glycosylated hemoglobin (Hb A1C)    glipiZIDE (GLUCOTROL XL) 5 MG extended release tablet    pioglitazone (ACTOS) 30 MG tablet    CBC with Auto Differential    ALT    AST    Basic Metabolic Panel    Hemoglobin A1C    Lipid Panel    Microalbumin, Ur   2. Essential hypertension  verapamil (CALAN SR) 120 MG extended release tablet    labetalol (NORMODYNE) 100 MG tablet    lisinopril (PRINIVIL;ZESTRIL) 20 MG tablet   3. Lower leg edema  furosemide (LASIX) 20 MG tablet   4.  Other screening mammogram  JOSE MIG[facility] DIGITAL SCREEN BILATERAL     We will start glipizide 5 mg XL and pioglitazone 30 mg daily. She will continue all other medications. We will see her back in 3 months with labs, sooner if any issues. 1.  Wendy received counseling on the following healthy behaviors: nutrition, exercise and medication adherence  2. Patient given educational materials - see patient instructions  3. Was a self-tracking handout given in paper form or via [employer]? No  If yes, see orders or list here. 4.  Discussed use, benefit, and side effects of prescribed medications. Barriers to medication compliance addressed. All patient questions answered. Pt voiced understanding. 5.  Reviewed prior labs and health maintenance  6. Continue current medications, diet and exercise. Completed Refills   Requested Prescriptions     Signed Prescriptions Disp Refills    verapamil (CALAN SR) 120 MG extended release tablet 90 tablet 3     Sig: Take 1 tablet by mouth once daily    labetalol (NORMODYNE) 100 MG tablet 90 tablet 3     Sig: Take 0.5 tablets by mouth 2 times daily    lisinopril (PRINIVIL;ZESTRIL) 20 MG tablet 90 tablet 3     Sig: Take 1 tablet by mouth daily    meloxicam (MOBIC) 15 MG tablet 90 tablet 3     Sig: Take 1 tablet by mouth daily    furosemide (LASIX) 20 MG tablet 90 tablet 3     Sig: Take 1 tablet by mouth daily    glipiZIDE (GLUCOTROL XL) 5 MG extended release tablet 30 tablet 3     Sig: Take 1 tablet by mouth daily    pioglitazone (ACTOS) 30 MG tablet 30 tablet 3     Sig: Take 1 tablet by mouth daily         Return in about 3 months (around 6/1/2022) for Check up.

## 2022-03-02 LAB — HBA1C MFR BLD: 10.3 %

## 2022-03-19 ENCOUNTER — HOSPITAL ENCOUNTER (OUTPATIENT)
Dept: WOMENS IMAGING | Age: 62
Discharge: HOME | End: 2022-03-21
Payer: COMMERCIAL

## 2022-03-19 DIAGNOSIS — Z12.31 OTHER SCREENING MAMMOGRAM: ICD-10-CM

## 2022-03-19 PROCEDURE — 77063 BREAST TOMOSYNTHESIS BI: CPT

## 2022-03-21 ENCOUNTER — TELEPHONE (OUTPATIENT)
Dept: PRIMARY CARE CLINIC | Age: 62
End: 2022-03-21

## 2022-03-21 NOTE — TELEPHONE ENCOUNTER
----- Message from [address] [place] [address], GIOVANI - CNP sent at 3/20/2022  7:34 AM EDT -----  Results are normal, please call patient and make them aware.

## 2022-05-25 ENCOUNTER — TELEPHONE (OUTPATIENT)
Dept: PRIMARY CARE CLINIC | Age: 62
End: 2022-05-25

## 2022-05-31 ENCOUNTER — HOSPITAL ENCOUNTER (OUTPATIENT)
Age: 62
Discharge: HOME | End: 2022-05-31
Payer: COMMERCIAL

## 2022-05-31 DIAGNOSIS — E11.65 UNCONTROLLED TYPE 2 DIABETES MELLITUS WITH HYPERGLYCEMIA (HCC): ICD-10-CM

## 2022-05-31 LAB
ABSOLUTE EOS #: 0.33 K/UL (ref 0–0.44)
ABSOLUTE IMMATURE GRANULOCYTE: 0.05 K/UL (ref 0–0.3)
ABSOLUTE LYMPH #: 1.53 K/UL (ref 1.1–3.7)
ABSOLUTE MONO #: 0.55 K/UL (ref 0.1–1.2)
ALT SERPL-CCNC: 26 U/L (ref 5–33)
ANION GAP SERPL CALCULATED.3IONS-SCNC: 9 MMOL/L (ref 9–17)
AST SERPL-CCNC: 34 U/L
BASOPHILS # BLD: 1 % (ref 0–2)
BASOPHILS ABSOLUTE: 0.06 K/UL (ref 0–0.2)
BUN BLDV-MCNC: 31 MG/DL (ref 8–23)
BUN/CREAT BLD: 30 (ref 9–20)
CALCIUM SERPL-MCNC: 10.3 MG/DL (ref 8.6–10.4)
CHLORIDE BLD-SCNC: 104 MMOL/L (ref 98–107)
CHOLESTEROL/HDL RATIO: 5.2
CHOLESTEROL: 248 MG/DL
CO2: 26 MMOL/L (ref 20–31)
CREAT SERPL-MCNC: 1.02 MG/DL (ref 0.5–0.9)
CREATININE URINE: 97.5 MG/DL (ref 28–217)
EOSINOPHILS RELATIVE PERCENT: 4 % (ref 1–4)
ESTIMATED AVERAGE GLUCOSE: 166 MG/DL
GFR AFRICAN AMERICAN: >60 ML/MIN
GFR NON-AFRICAN AMERICAN: 55 ML/MIN
GFR SERPL CREATININE-BSD FRML MDRD: ABNORMAL ML/MIN/{1.73_M2}
GFR SERPL CREATININE-BSD FRML MDRD: ABNORMAL ML/MIN/{1.73_M2}
GLUCOSE BLD-MCNC: 128 MG/DL (ref 70–99)
HBA1C MFR BLD: 7.4 % (ref 4–6)
HCT VFR BLD CALC: 42.1 % (ref 36.3–47.1)
HDLC SERPL-MCNC: 48 MG/DL
HEMOGLOBIN: 12.6 G/DL (ref 11.9–15.1)
IMMATURE GRANULOCYTES: 1 %
LDL CHOLESTEROL: 171 MG/DL (ref 0–130)
LYMPHOCYTES # BLD: 18 % (ref 24–43)
MCH RBC QN AUTO: 27.3 PG (ref 25.2–33.5)
MCHC RBC AUTO-ENTMCNC: 29.9 G/DL (ref 28.4–34.8)
MCV RBC AUTO: 91.1 FL (ref 82.6–102.9)
MICROALBUMIN/CREAT 24H UR: <12 MG/L
MICROALBUMIN/CREAT UR-RTO: NORMAL MCG/MG CREAT
MONOCYTES # BLD: 6 % (ref 3–12)
NRBC AUTOMATED: 0 PER 100 WBC
PDW BLD-RTO: 14.6 % (ref 11.8–14.4)
PLATELET # BLD: 261 K/UL (ref 138–453)
PMV BLD AUTO: 10.6 FL (ref 8.1–13.5)
POTASSIUM SERPL-SCNC: 4.8 MMOL/L (ref 3.7–5.3)
RBC # BLD: 4.62 M/UL (ref 3.95–5.11)
SEG NEUTROPHILS: 70 % (ref 36–65)
SEGMENTED NEUTROPHILS ABSOLUTE COUNT: 6.17 K/UL (ref 1.5–8.1)
SODIUM BLD-SCNC: 139 MMOL/L (ref 135–144)
TRIGL SERPL-MCNC: 147 MG/DL
WBC # BLD: 8.7 K/UL (ref 3.5–11.3)

## 2022-05-31 PROCEDURE — 85025 COMPLETE CBC W/AUTO DIFF WBC: CPT

## 2022-05-31 PROCEDURE — 80048 BASIC METABOLIC PNL TOTAL CA: CPT

## 2022-05-31 PROCEDURE — 84450 TRANSFERASE (AST) (SGOT): CPT

## 2022-05-31 PROCEDURE — 82570 ASSAY OF URINE CREATININE: CPT

## 2022-05-31 PROCEDURE — 84460 ALANINE AMINO (ALT) (SGPT): CPT

## 2022-05-31 PROCEDURE — 36415 COLL VENOUS BLD VENIPUNCTURE: CPT

## 2022-05-31 PROCEDURE — 82043 UR ALBUMIN QUANTITATIVE: CPT

## 2022-05-31 PROCEDURE — 83036 HEMOGLOBIN GLYCOSYLATED A1C: CPT

## 2022-05-31 PROCEDURE — 80061 LIPID PANEL: CPT

## 2022-06-01 ENCOUNTER — OFFICE VISIT (OUTPATIENT)
Dept: PRIMARY CARE CLINIC | Age: 62
End: 2022-06-01
Payer: COMMERCIAL

## 2022-06-01 VITALS
OXYGEN SATURATION: 99 % | RESPIRATION RATE: 22 BRPM | HEART RATE: 68 BPM | DIASTOLIC BLOOD PRESSURE: 72 MMHG | WEIGHT: 251.1 LBS | SYSTOLIC BLOOD PRESSURE: 128 MMHG | TEMPERATURE: 98.1 F | BODY MASS INDEX: 45.93 KG/M2

## 2022-06-01 DIAGNOSIS — F34.1 DYSTHYMIA: ICD-10-CM

## 2022-06-01 DIAGNOSIS — E78.5 DYSLIPIDEMIA: ICD-10-CM

## 2022-06-01 DIAGNOSIS — E11.21 CONTROLLED TYPE 2 DIABETES MELLITUS WITH DIABETIC NEPHROPATHY, WITHOUT LONG-TERM CURRENT USE OF INSULIN (HCC): Primary | ICD-10-CM

## 2022-06-01 PROCEDURE — 1036F TOBACCO NON-USER: CPT | Performed by: NURSE PRACTITIONER

## 2022-06-01 PROCEDURE — G8417 CALC BMI ABV UP PARAM F/U: HCPCS | Performed by: NURSE PRACTITIONER

## 2022-06-01 PROCEDURE — 99214 OFFICE O/P EST MOD 30 MIN: CPT | Performed by: NURSE PRACTITIONER

## 2022-06-01 PROCEDURE — G8427 DOCREV CUR MEDS BY ELIG CLIN: HCPCS | Performed by: NURSE PRACTITIONER

## 2022-06-01 PROCEDURE — 3017F COLORECTAL CA SCREEN DOC REV: CPT | Performed by: NURSE PRACTITIONER

## 2022-06-01 PROCEDURE — 3051F HG A1C>EQUAL 7.0%<8.0%: CPT | Performed by: NURSE PRACTITIONER

## 2022-06-01 PROCEDURE — 2022F DILAT RTA XM EVC RTNOPTHY: CPT | Performed by: NURSE PRACTITIONER

## 2022-06-01 RX ORDER — PANTOPRAZOLE SODIUM 40 MG/1
TABLET, DELAYED RELEASE ORAL
Qty: 90 TABLET | Refills: 1 | Status: SHIPPED | OUTPATIENT
Start: 2022-06-01

## 2022-06-01 RX ORDER — DULOXETIN HYDROCHLORIDE 30 MG/1
30 CAPSULE, DELAYED RELEASE ORAL DAILY
Qty: 90 CAPSULE | Refills: 1 | Status: SHIPPED | OUTPATIENT
Start: 2022-06-01

## 2022-06-01 RX ORDER — EZETIMIBE 10 MG/1
TABLET ORAL
Qty: 90 TABLET | Refills: 3 | Status: SHIPPED | OUTPATIENT
Start: 2022-06-01

## 2022-06-01 SDOH — ECONOMIC STABILITY: FOOD INSECURITY: WITHIN THE PAST 12 MONTHS, YOU WORRIED THAT YOUR FOOD WOULD RUN OUT BEFORE YOU GOT MONEY TO BUY MORE.: PATIENT DECLINED

## 2022-06-01 SDOH — ECONOMIC STABILITY: FOOD INSECURITY: WITHIN THE PAST 12 MONTHS, THE FOOD YOU BOUGHT JUST DIDN'T LAST AND YOU DIDN'T HAVE MONEY TO GET MORE.: PATIENT DECLINED

## 2022-06-01 ASSESSMENT — ENCOUNTER SYMPTOMS
NAUSEA: 0
DIARRHEA: 0
SHORTNESS OF BREATH: 0
ABDOMINAL PAIN: 0
SORE THROAT: 0
BLURRED VISION: 0
RHINORRHEA: 0
CONSTIPATION: 0
COUGH: 0
WHEEZING: 0
VISUAL CHANGE: 0
VOMITING: 0

## 2022-06-01 ASSESSMENT — PATIENT HEALTH QUESTIONNAIRE - PHQ9
SUM OF ALL RESPONSES TO PHQ QUESTIONS 1-9: 0
SUM OF ALL RESPONSES TO PHQ QUESTIONS 1-9: 0
SUM OF ALL RESPONSES TO PHQ9 QUESTIONS 1 & 2: 0
1. LITTLE INTEREST OR PLEASURE IN DOING THINGS: 0
SUM OF ALL RESPONSES TO PHQ QUESTIONS 1-9: 0
SUM OF ALL RESPONSES TO PHQ QUESTIONS 1-9: 0
2. FEELING DOWN, DEPRESSED OR HOPELESS: 0

## 2022-06-01 ASSESSMENT — SOCIAL DETERMINANTS OF HEALTH (SDOH): HOW HARD IS IT FOR YOU TO PAY FOR THE VERY BASICS LIKE FOOD, HOUSING, MEDICAL CARE, AND HEATING?: PATIENT DECLINED

## 2022-06-01 NOTE — PROGRESS NOTES
Name: July Williamson  : 1960         Chief Complaint:     Chief Complaint   Patient presents with    Diabetes     routine check, no concerns    Anxiety     issues at home       History of Present Illness: July Williamson is a 57 y.o.  female who presents with Diabetes (routine check, no concerns) and Anxiety (issues at home)      Oli Thorpe is here today for a routine office visit. DM-improved, A1c is 7.4 down from 10.3. Patient is congratulated for her efforts. See below for further comment. Dyslipidemia- worsening, patient states she is not taking [place] as instructed. She states her prescription ran out and she thought she was finished with the prescription. See below for further comment. Dysthymia-worsening, patient is going through a lot of stress at home with her  who has been very ill for several months. He is getting better but she feels a lot of stress and agitation. See below for further comment. Diabetes  She presents for her follow-up diabetic visit. She has type 2 diabetes mellitus. Her disease course has been improving. Hypoglycemia symptoms include nervousness/anxiousness. Pertinent negatives for hypoglycemia include no confusion, dizziness, headaches or seizures. Associated symptoms include fatigue and foot paresthesias. Pertinent negatives for diabetes include no blurred vision, no chest pain, no polydipsia, no polyphagia, no polyuria and no visual change. There are no hypoglycemic complications. Pertinent negatives for hypoglycemia complications include no hospitalization, no nocturnal hypoglycemia and no required assistance. Symptoms are stable. Diabetic complications include peripheral neuropathy. Pertinent negatives for diabetic complications include no CVA, heart disease or nephropathy. Risk factors for coronary artery disease include diabetes mellitus, dyslipidemia, family history, obesity, hypertension, post-menopausal, sedentary lifestyle and stress.  Current diabetic treatment includes oral agent (triple therapy). She is compliant with treatment all of the time. Her weight is stable. She is following a generally healthy diet. Meal planning includes avoidance of concentrated sweets. She has had a previous visit with a dietitian. She rarely participates in exercise. Her home blood glucose trend is decreasing steadily. Her breakfast blood glucose range is generally 140-180 mg/dl. An ACE inhibitor/angiotensin II receptor blocker is being taken. She does not see a podiatrist.Eye exam is current. Hyperlipidemia  This is a chronic problem. The current episode started more than 1 year ago. The problem is uncontrolled. Recent lipid tests were reviewed and are high. Exacerbating diseases include diabetes and obesity. She has no history of chronic renal disease, hypothyroidism, liver disease or nephrotic syndrome. Factors aggravating her hyperlipidemia include fatty foods. Pertinent negatives include no chest pain, focal weakness, leg pain, myalgias or shortness of breath. She is currently on no antihyperlipidemic treatment. The current treatment provides no improvement of lipids. Compliance problems include adherence to exercise. Risk factors for coronary artery disease include diabetes mellitus, dyslipidemia, family history, obesity, hypertension, stress, a sedentary lifestyle and post-menopausal.   Mental Health Problem  The primary symptoms include dysphoric mood, negative symptoms and somatic symptoms. The primary symptoms do not include delusions, hallucinations, bizarre behavior or disorganized speech. The current episode started more than 1 month ago. This is a chronic problem. The dysphoric mood began more than 2 weeks ago. The mood has been worsening since its onset. She characterizes the problem as moderate. The mood includes feelings of sadness and irritability. Her change in mood was precipitated by a stressful event.    The negative symptoms began more than 1 month ago. The negative symptoms appear to have been unchanged since their onset. The negative symptoms include anhedonia and attention impairment. The somatic symptoms began more than 1 month ago. The somatic symptoms have been worsening since their onset. The symptoms are moderate. Somatic symptoms include fatigue. Somatic symptoms do not include headaches, abdominal pain, constipation or myalgias. The onset of the illness is precipitated by emotional stress. The degree of incapacity that she is experiencing as a consequence of her illness is moderate. Sequelae of the illness include harmed interpersonal relations. Additional symptoms of the illness include anhedonia, insomnia, fatigue, agitation, attention impairment, flight of ideas and distractible. Additional symptoms of the illness do not include hypersomnia, appetite change, unexpected weight change, psychomotor retardation, feelings of worthlessness, euphoric mood, increased goal-directed activity, inflated self-esteem, decreased need for sleep, poor judgment, visual change, headaches, abdominal pain or seizures. She does not admit to suicidal ideas. She does not have a plan to attempt suicide. She does not contemplate harming herself. She has not already injured self. She does not contemplate injuring another person. She has not already  injured another person. Past Medical History:     Past Medical History:   Diagnosis Date    Diabetes mellitus ([facility] [address]     Hepatitis A     Hyperlipidemia     Hypertension       Reviewed all health maintenance requirements and ordered appropriate tests  There are no preventive care reminders to display for this patient.     Past Surgical History:     Past Surgical History:   Procedure Laterality Date    APPENDECTOMY      ARM SURGERY Left 10/29/2020    lesion biopsy excision multiple lipomas    ARM SURGERY Bilateral 10/29/2020    ARM LESION BIOPSY EXCISION, MULTIPLE LIPOMAS performed by Naman Turcios DO at [facility] 2  10/23/2020    Dr. Taylor Bran - polypectomy, hot biopsy    COLONOSCOPY N/A 10/23/2020    COLONOSCOPY POLYPECTOMY HOT BIOPSY performed by Nader Barajas DO at [address]  10/23/2020    COLONOSCOPY BIOPSY/STOMA performed by Nader Barajas DO at [id number] [place] ARTHROSCOPY Left 03/29/2021    SHOULDER ARTHROSCOPY, RCR, DEBRIDEMENT, SUBACROMIAL DECOMPRESSION,  BICEPS TENODESIS     SHOULDER ARTHROSCOPY Left 3/29/2021    SHOULDER ARTHROSCOPY, RCR, DEBRIDEMENT, SUBACROMIAL DECOMPRESSION,  BICEPS TENODESIS performed by Yang Bedolla MD at [facility]  12/04/2020    [place]-Dr. Ella Hutchins        Medications:       Prior to Admission medications    Medication Sig Start Date End Date Taking?  Authorizing Provider   ezetimibe (ZETIA) 10 mg tablet Take 1 tablet by mouth once daily 6/1/22  Yes Lucilla Stade Might, APRN - CNP   DULoxetine (CYMBALTA) 30 MG extended release capsule Take 1 capsule by mouth daily 6/1/22  Yes Lucilla Stade Might, APRN - CNP   pantoprazole (PROTONIX) 40 MG tablet TAKE 1 TABLET BY MOUTH ONCE DAILY 6/1/22  Yes Lucilla Stade Might, APRN - CNP   verapamil (CALAN SR) 120 MG extended release tablet Take 1 tablet by mouth once daily 3/1/22  Yes Lucilla Stade Might, APRN - CNP   labetalol (NORMODYNE) 100 MG tablet Take 0.5 tablets by mouth 2 times daily 3/1/22  Yes Lucilla Stade Might, APRN - CNP   lisinopril (PRINIVIL;ZESTRIL) 20 MG tablet Take 1 tablet by mouth daily 3/1/22  Yes Lucilla Stade Might, APRN - CNP   meloxicam (MOBIC) 15 MG tablet Take 1 tablet by mouth daily 3/1/22  Yes Lucilla Stade Might, APRN - CNP   furosemide (LASIX) 20 MG tablet Take 1 tablet by mouth daily 3/1/22  Yes Lucilla Stade Might, APRN - CNP   glipiZIDE (GLUCOTROL XL) 5 MG extended release tablet Take 1 tablet by mouth daily 3/1/22  Yes Lucilla Stade Might, APRN - CNP   pioglitazone (ACTOS) 30 MG tablet Take 1 tablet by mouth daily 3/1/22  Yes Caren Dumont, APRN - CNP        Allergies:       Statins    Social History:     Tobacco:    reports that she has never smoked. She has never used smokeless tobacco.  Alcohol:      reports no history of alcohol use. Drug Use:  reports no history of drug use. Family History:     Family History   Problem Relation Age of Onset    Diabetes Father        Review of Systems:     Positive and Negative as described in HPI    Review of Systems   Constitutional: Positive for fatigue. Negative for appetite change, chills, fever and unexpected weight change. HENT: Negative for congestion, rhinorrhea and sore throat. Eyes: Negative for blurred vision and visual disturbance. Respiratory: Negative for cough, shortness of breath and wheezing. Cardiovascular: Negative for chest pain and palpitations. Gastrointestinal: Negative for abdominal pain, constipation, diarrhea, nausea and vomiting. Endocrine: Negative for polydipsia, polyphagia and polyuria. Genitourinary: Negative for difficulty urinating and dysuria. Musculoskeletal: Positive for arthralgias (chronic). Negative for gait problem, myalgias, neck pain and neck stiffness. Skin: Negative for rash. Neurological: Negative for dizziness, focal weakness, seizures, syncope, light-headedness and headaches. Psychiatric/Behavioral: Positive for agitation, decreased concentration, dysphoric mood and sleep disturbance. Negative for behavioral problems, confusion, hallucinations, self-injury and suicidal ideas. The patient is nervous/anxious and has insomnia. The patient is not hyperactive. Physical Exam:   Vitals:  /72 (Position: Sitting)   Pulse 68   Temp 98.1 °F (36.7 °C) (Temporal)   Resp 22   Wt 251 lb 1.6 oz (113.9 kg)   SpO2 99%   BMI 45.93 kg/m²     Physical Exam  Vitals and nursing note reviewed. Constitutional:       General: She is not in acute distress. Appearance: Normal appearance. She is well-developed. She is obese.  She is not ill-appearing. HENT:      Mouth/Throat:      Mouth: Mucous membranes are moist.   Eyes:      Conjunctiva/sclera: Conjunctivae normal.   Cardiovascular:      Rate and Rhythm: Normal rate and regular rhythm. Heart sounds: No murmur heard. Pulmonary:      Effort: Pulmonary effort is normal.      Breath sounds: Normal breath sounds. No wheezing. Abdominal:      General: Bowel sounds are normal. There is no distension. Palpations: Abdomen is soft. Tenderness: There is no abdominal tenderness. Musculoskeletal:      Cervical back: Normal range of motion and neck supple. Right lower leg: No edema. Left lower leg: No edema. Lymphadenopathy:      Cervical: No cervical adenopathy. Skin:     General: Skin is warm and dry. Findings: No rash. Neurological:      Mental Status: She is alert and oriented to person, place, and time. Psychiatric:         Attention and Perception: Attention normal.         Mood and Affect: Mood is anxious and depressed. Affect is tearful (at times). Speech: Speech normal.         Behavior: Behavior normal. Behavior is cooperative. Thought Content: Thought content normal. Thought content does not include homicidal or suicidal ideation. Thought content does not include homicidal or suicidal plan.          Cognition and Memory: Cognition normal.         Judgment: Judgment normal.         Data:     Lab Results   Component Value Date     05/31/2022    K 4.8 05/31/2022     05/31/2022    CO2 26 05/31/2022    BUN 31 05/31/2022    CREATININE 1.02 05/31/2022    GLUCOSE 128 05/31/2022    PROT 7.7 10/17/2020    LABALBU 4.6 10/17/2020    BILITOT 0.45 10/17/2020    ALKPHOS 65 10/17/2020    AST 34 05/31/2022    ALT 26 05/31/2022     Lab Results   Component Value Date    WBC 8.7 05/31/2022    RBC 4.62 05/31/2022    HGB 12.6 05/31/2022    HCT 42.1 05/31/2022    MCV 91.1 05/31/2022    MCH 27.3 05/31/2022    MCHC 29.9 05/31/2022    RDW 14.6 05/31/2022     05/31/2022    MPV 10.6 05/31/2022     Lab Results   Component Value Date    TSH 1.78 07/18/2016     Lab Results   Component Value Date    CHOL 248 05/31/2022    CHOL 271 11/10/2017    HDL 48 05/31/2022    LABA1C 7.4 05/31/2022       Assessment/Plan:      Diagnosis Orders   1. Controlled type 2 diabetes mellitus with diabetic nephropathy, without long-term current use of insulin ([facility])  Basic Metabolic Panel    Hemoglobin A1C   2. Dyslipidemia  ezetimibe (ZETIA) 10 mg tablet    Lipid Panel   3. Dysthymia  DULoxetine (CYMBALTA) 30 MG extended release capsule     We will continue all current medications. A1c is improved. Blood pressure is well controlled. Restart Zetia. Start duloxetine 30 mg daily. Call in a few weeks with progress. Sooner if any problems. We will see her back in 6 months with repeat labs, sooner if any issues. 1.  Wendy received counseling on the following healthy behaviors: nutrition, exercise and medication adherence  2. Patient given educational materials - see patient instructions  3. Was a self-tracking handout given in paper form or via [employer]? No  If yes, see orders or list here. 4.  Discussed use, benefit, and side effects of prescribed medications. Barriers to medication compliance addressed. All patient questions answered. Pt voiced understanding. 5.  Reviewed prior labs and health maintenance  6. Continue current medications, diet and exercise. Completed Refills   Requested Prescriptions     Signed Prescriptions Disp Refills    ezetimibe (ZETIA) 10 mg tablet 90 tablet 3     Sig: Take 1 tablet by mouth once daily    DULoxetine (CYMBALTA) 30 MG extended release capsule 90 capsule 1     Sig: Take 1 capsule by mouth daily    pantoprazole (PROTONIX) 40 MG tablet 90 tablet 1     Sig: TAKE 1 TABLET BY MOUTH ONCE DAILY         Return in about 6 months (around 12/1/2022) for Check up.

## 2022-06-01 NOTE — PATIENT INSTRUCTIONS
SURVEY:     You may be receiving a survey from [employer] regarding your visit today. Please complete the survey to enable us to provide the highest quality of care to you and your family. If you cannot score us a very good on any question, please call the office to discuss how we could have made your experience a very good one. Thank you. Rafael Dumont, APRN-CNP  Blaine Newman, CNP  Bud Enriquez, LPN  Sonia Bobby, CMA  Supa, CMA  Tatiana, CMA  Marina, PCA    Patient Education        Counting Carbohydrates for Diabetes: Care Instructions  Overview     Managing the amount of carbohydrate (carbs) you eat is an important part of planning healthy meals when you have diabetes. Carbs raise blood sugar more than any other nutrient. Carbs are found in grains, starchy vegetables, fruits,and milk and yogurt. Carbs are also found in sugar-sweetened foods and drinks. The more carbs you eat at one time, the higher your blood sugar will rise. Counting carbs can help you keep your blood sugar within your target range. If you use insulin, counting carbs helps you match the right amount of insulinto the number of grams of carbs in a meal.  A registered dietitian or diabetes educator can help you plan meals and snacks. Follow-up care is a key part of your treatment and safety. Be sure to make and go to all appointments, and call your doctor if you are having problems. It's also a good idea to know your test results and keep alist of the medicines you take. How can you care for yourself at home? Know your daily amount of carbohydrates  Your daily amount depends on several things, such as your weight, how active you are, which diabetes medicines you take, and what your goals are for your blood sugar levels. A registered dietitian or diabetes educator can help youplan how many carbs to include in each meal and snack.   For most adults, a guideline for the daily amount of carbs is:   45 to 60 grams at each meal. That's about the same as 3 to 4 carbohydrate servings.  15 to 20 grams at each snack. That's about the same as 1 carbohydrate serving. Count carbs  Counting carbs lets you know how much rapid-acting insulin to take before you eat. If you use an insulin pump, you get a constant rate of insulin during the day. So the pump must be programmed at meals. This gives you extra insulin tocover the rise in blood sugar after meals. If you take insulin:   Learn your own insulin-to-carb ratio. You and your diabetes health professional will figure out the ratio. You can do this by testing your blood sugar after meals. For example, you may need a certain amount of insulin for every 15 grams of carbs.  Add up the carb grams in a meal. Then you can figure out how many units of insulin to take based on your insulin-to-carb ratio.  Exercise lowers blood sugar. You can use less insulin than you would if you were not doing exercise. Keep in mind that timing matters. If you exercise within 1 hour after a meal, your body may need less insulin for that meal than it would if you exercised 3 hours after the meal. Test your blood sugar to find out how exercise affects your need for insulin. If you do or don't take insulin:   Look at labels on packaged foods. This can tell you how many carbs are in a serving.  Be aware of portions, or serving sizes. If a package has two servings and you eat the whole package, you need to double the number of grams of carbohydrate listed for one serving.  Protein, fat, and fiber do not raise blood sugar as much as carbs do. If you eat a lot of these nutrients in a meal, your blood sugar will rise more slowly than it would otherwise. Where can you learn more? Go to https://rafael.[employer]. org and sign in to your [employer] account. Enter T472 in the [employer] box to learn more about \"Counting Carbohydrates for Diabetes: Care Instructions. \"     If you do not have an account, please click on the \"Sign Up Now\" link. Current as of: July 28, 2021               Content Version: 13.2  © 2006-2022 Healthwise, Incorporated. Care instructions adapted under license by [facility] ([facility]). If you have questions about a medical condition or this instruction, always ask your healthcare professional. [place] [address] any warranty or liability for your use of this information.

## 2022-06-27 DIAGNOSIS — E11.65 UNCONTROLLED TYPE 2 DIABETES MELLITUS WITH HYPERGLYCEMIA (HCC): ICD-10-CM

## 2022-06-27 RX ORDER — GLIPIZIDE 5 MG/1
TABLET, FILM COATED, EXTENDED RELEASE ORAL
Qty: 30 TABLET | Refills: 0 | OUTPATIENT
Start: 2022-06-27

## 2022-06-27 RX ORDER — GLIPIZIDE 5 MG/1
TABLET, FILM COATED, EXTENDED RELEASE ORAL
Qty: 90 TABLET | Refills: 1 | Status: SHIPPED | OUTPATIENT
Start: 2022-06-27 | End: 2022-08-31 | Stop reason: SDUPTHER

## 2022-06-27 RX ORDER — PIOGLITAZONEHYDROCHLORIDE 30 MG/1
TABLET ORAL
Qty: 90 TABLET | Refills: 1 | Status: SHIPPED | OUTPATIENT
Start: 2022-06-27 | End: 2022-08-31 | Stop reason: SDUPTHER

## 2022-06-27 RX ORDER — PIOGLITAZONEHYDROCHLORIDE 30 MG/1
TABLET ORAL
Qty: 30 TABLET | Refills: 0 | OUTPATIENT
Start: 2022-06-27

## 2022-06-27 NOTE — TELEPHONE ENCOUNTER
Health Maintenance   Topic Date Due    Diabetic retinal exam  09/09/2022 (Originally 8/8/2018)    Pneumococcal 0-64 years Vaccine (2 - PCV) 06/01/2023 (Originally 11/6/2018)    Diabetic foot exam  08/30/2022    A1C test (Diabetic or Prediabetic)  05/31/2023    Lipids  05/31/2023    Depression Monitoring  06/01/2023    Colorectal Cancer Screen  10/23/2023    Breast cancer screen  03/19/2024    DTaP/Tdap/Td vaccine (2 - Td or Tdap) 02/14/2025    Flu vaccine  Completed    Shingles vaccine  Completed    COVID-19 Vaccine  Completed    Hepatitis C screen  Completed    HIV screen  Completed    Hepatitis A vaccine  Aged Out    Hib vaccine  Aged Out    Meningococcal (ACWY) vaccine  Aged Out             (applicable per patient's age: Cancer Screenings, Depression Screening, Fall Risk Screening, Immunizations)    Hemoglobin A1C (%)   Date Value   05/31/2022 7.4 (H)   03/01/2022 10.3   08/20/2021 7.0 (H)     Microalb/Crt.  Ratio (mcg/mg creat)   Date Value   05/31/2022 Can not be calculated     LDL Cholesterol (mg/dL)   Date Value   05/31/2022 171 (H)     LDL Calculated (mg/dL)   Date Value   11/10/2017 175 (A)     AST (U/L)   Date Value   05/31/2022 34 (H)     ALT (U/L)   Date Value   05/31/2022 26     BUN (mg/dL)   Date Value   05/31/2022 31 (H)      (goal A1C is < 7)   (goal LDL is <100) need 30-50% reduction from baseline     BP Readings from Last 3 Encounters:   06/01/22 128/72   03/01/22 130/84   08/30/21 118/74    (goal /80)      All Future Testing planned in [facility]:  Lab Frequency Next Occurrence   Basic Metabolic Panel Once 56/39/6990   Hemoglobin A1C Once 11/28/2022   Lipid Panel Once 11/28/2022       Next Visit Date:  Future Appointments   Date Time Provider Abdullahi Harding   12/1/2022  9:40 AM Kassie Dumont, APRN - CNP [facility]            Patient Active Problem List:     Gastroenteritis, acute     Controlled type 2 diabetes mellitus with diabetic nephropathy, without long-term

## 2022-08-31 DIAGNOSIS — E11.65 UNCONTROLLED TYPE 2 DIABETES MELLITUS WITH HYPERGLYCEMIA (HCC): ICD-10-CM

## 2022-08-31 RX ORDER — PIOGLITAZONEHYDROCHLORIDE 30 MG/1
TABLET ORAL
Qty: 90 TABLET | Refills: 1 | Status: SHIPPED | OUTPATIENT
Start: 2022-08-31

## 2022-08-31 RX ORDER — GLIPIZIDE 5 MG/1
TABLET, FILM COATED, EXTENDED RELEASE ORAL
Qty: 90 TABLET | Refills: 1 | Status: SHIPPED | OUTPATIENT
Start: 2022-08-31

## 2022-08-31 NOTE — TELEPHONE ENCOUNTER
Health Maintenance   Topic Date Due    COVID-19 Vaccine (4 - Booster for Moderna series) 04/07/2022    Diabetic foot exam  08/30/2022    Diabetic retinal exam  09/09/2022 (Originally 8/8/2018)    Pneumococcal 0-64 years Vaccine (2 - PCV) 06/01/2023 (Originally 11/6/2018)    Flu vaccine (1) 09/01/2022    A1C test (Diabetic or Prediabetic)  05/31/2023    Lipids  05/31/2023    Depression Monitoring  06/01/2023    Colorectal Cancer Screen  10/23/2023    Breast cancer screen  03/19/2024    DTaP/Tdap/Td vaccine (2 - Td or Tdap) 02/14/2025    Shingles vaccine  Completed    Hepatitis C screen  Completed    HIV screen  Completed    Hepatitis A vaccine  Aged Out    Hib vaccine  Aged Out    Meningococcal (ACWY) vaccine  Aged Out             (applicable per patient's age: Cancer Screenings, Depression Screening, Fall Risk Screening, Immunizations)    Hemoglobin A1C (%)   Date Value   05/31/2022 7.4 (H)   03/01/2022 10.3   08/20/2021 7.0 (H)     Microalb/Crt.  Ratio (mcg/mg creat)   Date Value   05/31/2022 Can not be calculated     LDL Cholesterol (mg/dL)   Date Value   05/31/2022 171 (H)     LDL Calculated (mg/dL)   Date Value   11/10/2017 175 (A)     AST (U/L)   Date Value   05/31/2022 34 (H)     ALT (U/L)   Date Value   05/31/2022 26     BUN (mg/dL)   Date Value   05/31/2022 31 (H)      (goal A1C is < 7)   (goal LDL is <100) need 30-50% reduction from baseline     BP Readings from Last 3 Encounters:   06/01/22 128/72   03/01/22 130/84   08/30/21 118/74    (goal /80)      All Future Testing planned in [facility]:  Lab Frequency Next Occurrence   Basic Metabolic Panel Once 30/73/0707   Hemoglobin A1C Once 11/28/2022   Lipid Panel Once 11/28/2022       Next Visit Date:  Future Appointments   Date Time Provider Abdullahi Harding   12/1/2022  9:40 AM Yadira Dumont, APRN - CNP [facility]            Patient Active Problem List:     Gastroenteritis, acute     Controlled type 2 diabetes mellitus with diabetic nephropathy, without long-term current use of insulin (HCC)     Dyslipidemia     Essential hypertension     Uncontrolled type 2 diabetes mellitus with hyperglycemia (HCC)     Positive FIT (fecal immunochemical test)     Acute nonintractable headache     Lipoma of left upper extremity     Dysthymia

## 2022-11-24 DIAGNOSIS — F34.1 DYSTHYMIA: ICD-10-CM

## 2022-11-25 RX ORDER — PANTOPRAZOLE SODIUM 40 MG/1
TABLET, DELAYED RELEASE ORAL
Qty: 90 TABLET | Refills: 1 | Status: SHIPPED | OUTPATIENT
Start: 2022-11-25

## 2022-11-25 RX ORDER — DULOXETIN HYDROCHLORIDE 30 MG/1
CAPSULE, DELAYED RELEASE ORAL
Qty: 90 CAPSULE | Refills: 1 | Status: SHIPPED | OUTPATIENT
Start: 2022-11-25 | End: 2022-12-01 | Stop reason: SDUPTHER

## 2022-11-25 NOTE — TELEPHONE ENCOUNTER
Dyslipidemia     Essential hypertension     Uncontrolled type 2 diabetes mellitus with hyperglycemia (HCC)     Positive FIT (fecal immunochemical test)     Acute nonintractable headache     Lipoma of left upper extremity     Dysthymia

## 2022-11-30 ENCOUNTER — HOSPITAL ENCOUNTER (OUTPATIENT)
Age: 62
Discharge: HOME | End: 2022-11-30
Payer: COMMERCIAL

## 2022-11-30 DIAGNOSIS — E78.5 DYSLIPIDEMIA: ICD-10-CM

## 2022-11-30 DIAGNOSIS — E11.21 CONTROLLED TYPE 2 DIABETES MELLITUS WITH DIABETIC NEPHROPATHY, WITHOUT LONG-TERM CURRENT USE OF INSULIN (HCC): ICD-10-CM

## 2022-11-30 LAB
ANION GAP SERPL CALCULATED.3IONS-SCNC: 13 MMOL/L (ref 9–17)
BUN BLDV-MCNC: 26 MG/DL (ref 8–23)
BUN/CREAT BLD: 26 (ref 9–20)
CALCIUM SERPL-MCNC: 10 MG/DL (ref 8.6–10.4)
CHLORIDE BLD-SCNC: 97 MMOL/L (ref 98–107)
CO2: 25 MMOL/L (ref 20–31)
CREAT SERPL-MCNC: 1.01 MG/DL (ref 0.5–0.9)
ESTIMATED AVERAGE GLUCOSE: 229 MG/DL
GFR SERPL CREATININE-BSD FRML MDRD: >60 ML/MIN/1.73M2
GLUCOSE BLD-MCNC: 337 MG/DL (ref 70–99)
HBA1C MFR BLD: 9.6 % (ref 4–6)
POTASSIUM SERPL-SCNC: 4.5 MMOL/L (ref 3.7–5.3)
SODIUM BLD-SCNC: 135 MMOL/L (ref 135–144)

## 2022-11-30 PROCEDURE — 80048 BASIC METABOLIC PNL TOTAL CA: CPT

## 2022-11-30 PROCEDURE — 80061 LIPID PANEL: CPT

## 2022-11-30 PROCEDURE — 36415 COLL VENOUS BLD VENIPUNCTURE: CPT

## 2022-11-30 PROCEDURE — 83036 HEMOGLOBIN GLYCOSYLATED A1C: CPT

## 2022-12-01 ENCOUNTER — OFFICE VISIT (OUTPATIENT)
Dept: PRIMARY CARE CLINIC | Age: 62
End: 2022-12-01
Payer: COMMERCIAL

## 2022-12-01 VITALS
OXYGEN SATURATION: 98 % | HEART RATE: 78 BPM | WEIGHT: 269.3 LBS | TEMPERATURE: 97.7 F | RESPIRATION RATE: 20 BRPM | DIASTOLIC BLOOD PRESSURE: 70 MMHG | SYSTOLIC BLOOD PRESSURE: 122 MMHG | BODY MASS INDEX: 49.26 KG/M2

## 2022-12-01 DIAGNOSIS — F34.1 DYSTHYMIA: ICD-10-CM

## 2022-12-01 DIAGNOSIS — E11.65 UNCONTROLLED TYPE 2 DIABETES MELLITUS WITH HYPERGLYCEMIA (HCC): Primary | ICD-10-CM

## 2022-12-01 DIAGNOSIS — I10 ESSENTIAL HYPERTENSION: ICD-10-CM

## 2022-12-01 DIAGNOSIS — G89.29 CHRONIC RIGHT HIP PAIN: ICD-10-CM

## 2022-12-01 DIAGNOSIS — J01.40 ACUTE NON-RECURRENT PANSINUSITIS: ICD-10-CM

## 2022-12-01 DIAGNOSIS — Z23 NEED FOR INFLUENZA VACCINATION: ICD-10-CM

## 2022-12-01 DIAGNOSIS — M25.551 CHRONIC RIGHT HIP PAIN: ICD-10-CM

## 2022-12-01 PROBLEM — R51.9 ACUTE NONINTRACTABLE HEADACHE: Status: RESOLVED | Noted: 2020-08-03 | Resolved: 2022-12-01

## 2022-12-01 LAB
CHOLESTEROL/HDL RATIO: 5.3
CHOLESTEROL: 218 MG/DL
HDLC SERPL-MCNC: 41 MG/DL
LDL CHOLESTEROL: 131 MG/DL (ref 0–130)
TRIGL SERPL-MCNC: 230 MG/DL

## 2022-12-01 PROCEDURE — G8417 CALC BMI ABV UP PARAM F/U: HCPCS | Performed by: NURSE PRACTITIONER

## 2022-12-01 PROCEDURE — 1036F TOBACCO NON-USER: CPT | Performed by: NURSE PRACTITIONER

## 2022-12-01 PROCEDURE — 3017F COLORECTAL CA SCREEN DOC REV: CPT | Performed by: NURSE PRACTITIONER

## 2022-12-01 PROCEDURE — 99214 OFFICE O/P EST MOD 30 MIN: CPT | Performed by: NURSE PRACTITIONER

## 2022-12-01 PROCEDURE — 90674 CCIIV4 VAC NO PRSV 0.5 ML IM: CPT | Performed by: NURSE PRACTITIONER

## 2022-12-01 PROCEDURE — 90471 IMMUNIZATION ADMIN: CPT | Performed by: NURSE PRACTITIONER

## 2022-12-01 PROCEDURE — 2022F DILAT RTA XM EVC RTNOPTHY: CPT | Performed by: NURSE PRACTITIONER

## 2022-12-01 PROCEDURE — 3046F HEMOGLOBIN A1C LEVEL >9.0%: CPT | Performed by: NURSE PRACTITIONER

## 2022-12-01 PROCEDURE — 3078F DIAST BP <80 MM HG: CPT | Performed by: NURSE PRACTITIONER

## 2022-12-01 PROCEDURE — G8427 DOCREV CUR MEDS BY ELIG CLIN: HCPCS | Performed by: NURSE PRACTITIONER

## 2022-12-01 PROCEDURE — 3074F SYST BP LT 130 MM HG: CPT | Performed by: NURSE PRACTITIONER

## 2022-12-01 PROCEDURE — G8482 FLU IMMUNIZE ORDER/ADMIN: HCPCS | Performed by: NURSE PRACTITIONER

## 2022-12-01 RX ORDER — AMOXICILLIN AND CLAVULANATE POTASSIUM 875; 125 MG/1; MG/1
1 TABLET, FILM COATED ORAL 2 TIMES DAILY
Qty: 14 TABLET | Refills: 0 | Status: SHIPPED | OUTPATIENT
Start: 2022-12-01 | End: 2022-12-08

## 2022-12-01 RX ORDER — DULOXETIN HYDROCHLORIDE 30 MG/1
60 CAPSULE, DELAYED RELEASE ORAL DAILY
Qty: 90 CAPSULE | Refills: 1 | Status: SHIPPED | OUTPATIENT
Start: 2022-12-01

## 2022-12-01 RX ORDER — GLIPIZIDE 10 MG/1
TABLET, FILM COATED, EXTENDED RELEASE ORAL
Qty: 90 TABLET | Refills: 1 | Status: SHIPPED | OUTPATIENT
Start: 2022-12-01

## 2022-12-01 ASSESSMENT — PATIENT HEALTH QUESTIONNAIRE - PHQ9
SUM OF ALL RESPONSES TO PHQ QUESTIONS 1-9: 0
8. MOVING OR SPEAKING SO SLOWLY THAT OTHER PEOPLE COULD HAVE NOTICED. OR THE OPPOSITE, BEING SO FIGETY OR RESTLESS THAT YOU HAVE BEEN MOVING AROUND A LOT MORE THAN USUAL: 0
4. FEELING TIRED OR HAVING LITTLE ENERGY: 0
2. FEELING DOWN, DEPRESSED OR HOPELESS: 0
9. THOUGHTS THAT YOU WOULD BE BETTER OFF DEAD, OR OF HURTING YOURSELF: 0
SUM OF ALL RESPONSES TO PHQ9 QUESTIONS 1 & 2: 0
SUM OF ALL RESPONSES TO PHQ QUESTIONS 1-9: 0
5. POOR APPETITE OR OVEREATING: 0
7. TROUBLE CONCENTRATING ON THINGS, SUCH AS READING THE NEWSPAPER OR WATCHING TELEVISION: 0
3. TROUBLE FALLING OR STAYING ASLEEP: 0
10. IF YOU CHECKED OFF ANY PROBLEMS, HOW DIFFICULT HAVE THESE PROBLEMS MADE IT FOR YOU TO DO YOUR WORK, TAKE CARE OF THINGS AT HOME, OR GET ALONG WITH OTHER PEOPLE: 0
6. FEELING BAD ABOUT YOURSELF - OR THAT YOU ARE A FAILURE OR HAVE LET YOURSELF OR YOUR FAMILY DOWN: 0
1. LITTLE INTEREST OR PLEASURE IN DOING THINGS: 0
SUM OF ALL RESPONSES TO PHQ QUESTIONS 1-9: 0
SUM OF ALL RESPONSES TO PHQ QUESTIONS 1-9: 0

## 2022-12-01 ASSESSMENT — ENCOUNTER SYMPTOMS
VOMITING: 0
SINUS PAIN: 1
VISUAL CHANGE: 0
DIARRHEA: 0
SHORTNESS OF BREATH: 0
CONSTIPATION: 0
ORTHOPNEA: 0
COUGH: 1
NAUSEA: 0
WHEEZING: 0
SINUS COMPLAINT: 1
CHOKING: 0
BLURRED VISION: 0
RHINORRHEA: 1
ABDOMINAL PAIN: 0
TROUBLE SWALLOWING: 0
HYPERVENTILATION: 0
HOARSE VOICE: 0
SORE THROAT: 1
SINUS PRESSURE: 1
HOURS OF SLEEP PER NIGHT: 3 HOURS
SWOLLEN GLANDS: 0

## 2022-12-01 NOTE — PROGRESS NOTES
Name: Jesse Alonso  : 1960         Chief Complaint:     Chief Complaint   Patient presents with    Diabetes     Routine check.  Hypertension    Depression    Hip Pain    Sinus Problem       History of Present Illness: Jesse Alonso is a 60 y.o.  female who presents with Diabetes (Routine check.), Hypertension, Depression, Hip Pain, and Sinus Problem      Cuco Wood is here today for a routine office visit. DM-Worsening, patient states she has not taking pioglitazone that she thinks is making her urinate. In fact I believe that is her higher blood sugars that are making her urinate more often. Have asked her to restart. We will also adjust her other medications. See below for further comment. Chronic right hip pain-patient states some days she is unable to walk due to the pain in her right hip. She states has been going on for quite some time. She has not called the office to let us know of this. She states she is in physical therapy for \"3 years\" and it has not helped. Dysthymia-worsening, patient states she has no energy, she is fatigued. Patient states she lets her  do all the cooking therefore she is not eating well. See below for further comment. Diabetes  She presents for her follow-up diabetic visit. She has type 2 diabetes mellitus. No MedicAlert identification noted. Onset time: YEARS. Her disease course has been worsening. Hypoglycemia symptoms include headaches and nervousness/anxiousness. Pertinent negatives for hypoglycemia include no confusion, dizziness or sweats. Associated symptoms include fatigue and polyuria. Pertinent negatives for diabetes include no blurred vision, no chest pain, no polydipsia, no polyphagia, no visual change, no weakness and no weight loss. There are no hypoglycemic complications. Pertinent negatives for hypoglycemia complications include no blackouts, no hospitalization and no required assistance. Symptoms are stable.  Pertinent negatives for diabetic complications include no CVA, heart disease, nephropathy or peripheral neuropathy. Risk factors for coronary artery disease include diabetes mellitus, dyslipidemia, family history, obesity, hypertension, stress, sedentary lifestyle and post-menopausal. Current diabetic treatment includes oral agent (dual therapy). She is compliant with treatment some of the time. Her weight is increasing steadily. She is following a generally unhealthy diet. When asked about meal planning, she reported none. She has not had a previous visit with a dietitian. She never participates in exercise. Her home blood glucose trend is increasing steadily. Her breakfast blood glucose range is generally >200 mg/dl. An ACE inhibitor/angiotensin II receptor blocker is being taken. She does not see a podiatrist.Eye exam is not current. Hypertension  This is a chronic problem. The current episode started more than 1 year ago. The problem is unchanged. The problem is controlled. Associated symptoms include anxiety, headaches, malaise/fatigue and peripheral edema. Pertinent negatives include no blurred vision, chest pain, neck pain, orthopnea, palpitations, PND, shortness of breath or sweats. There are no associated agents to hypertension. Risk factors for coronary artery disease include diabetes mellitus, dyslipidemia, family history, post-menopausal state, obesity, stress and sedentary lifestyle. Past treatments include ACE inhibitors, beta blockers, calcium channel blockers and diuretics. The current treatment provides moderate improvement. Compliance problems include exercise and diet. There is no history of kidney disease, CAD/MI, CVA or heart failure. There is no history of chronic renal disease. Depression  Visit Type: follow-up  Patient presents with the following symptoms: anhedonia, decreased concentration, depressed mood, fatigue, insomnia, nervousness/anxiety and weight gain.   Patient is not experiencing: chest pain, choking sensation, compulsions, confusion, dizziness, dry mouth, excessive worry, feelings of hopelessness, feelings of worthlessness, hypersomnia, hyperventilation, irritability, malaise, memory impairment, muscle tension, nausea, obsessions, palpitations, panic, psychomotor agitation, psychomotor retardation, restlessness, shortness of breath, suicidal ideas, suicidal planning, thoughts of death and weight loss. Frequency of symptoms: constantly   Severity: interfering with daily activities   Sleep per night: 3 hours  Sleep quality: non-restorative  Nighttime awakenings: several  Compliance with medications:  %    Hip Pain   The incident occurred more than 1 week ago (CHRONIC). Incident location: NO SPECIFIC INDICENT. The injury mechanism is unknown. The pain is present in the right hip. The quality of the pain is described as stabbing. The pain is at a severity of 5/10. The pain is moderate. The pain has been Constant since onset. Associated symptoms include an inability to bear weight and a loss of motion. Pertinent negatives include no loss of sensation, muscle weakness, numbness or tingling. She reports no foreign bodies present. The symptoms are aggravated by movement and weight bearing. She has tried NSAIDs, acetaminophen, non-weight bearing and rest for the symptoms. The treatment provided mild relief. Sinus Problem  This is a new problem. The current episode started in the past 7 days. The problem has been gradually worsening since onset. There has been no fever. Her pain is at a severity of 3/10. The pain is mild. Associated symptoms include congestion, coughing, headaches, sinus pressure, sneezing and a sore throat. Pertinent negatives include no chills, diaphoresis, ear pain, hoarse voice, neck pain, shortness of breath or swollen glands. Past treatments include nothing. The treatment provided no relief.        Past Medical History:     Past Medical History:   Diagnosis Date    Hepatitis A       Reviewed all health maintenance requirements and ordered appropriate tests  Health Maintenance Due   Topic Date Due    Flu vaccine (1) 08/01/2022       Past Surgical History:     Past Surgical History:   Procedure Laterality Date    APPENDECTOMY      ARM SURGERY Left 10/29/2020    lesion biopsy excision multiple lipomas    ARM SURGERY Bilateral 10/29/2020    ARM LESION BIOPSY EXCISION, MULTIPLE LIPOMAS performed by Heath Dunaway DO at [address]  10/23/2020    Dr. Angeline Montgomery - polypectomy, hot biopsy    COLONOSCOPY N/A 10/23/2020    COLONOSCOPY POLYPECTOMY HOT BIOPSY performed by Heath Dunaway DO at [address]  10/23/2020    COLONOSCOPY BIOPSY/STOMA performed by Heath Dunaway DO at [address] (CERVIX STATUS UNKNOWN)      SHOULDER ARTHROSCOPY Left 03/29/2021    SHOULDER ARTHROSCOPY, RCR, DEBRIDEMENT, SUBACROMIAL DECOMPRESSION,  BICEPS TENODESIS     SHOULDER ARTHROSCOPY Left 3/29/2021    SHOULDER ARTHROSCOPY, RCR, DEBRIDEMENT, SUBACROMIAL DECOMPRESSION,  BICEPS TENODESIS performed by Maida Roberto MD at [place] [address]  12/04/2020    Jessika-Dr. Claudette Paris        Medications:       Prior to Admission medications    Medication Sig Start Date End Date Taking?  Authorizing Provider   DULoxetine (CYMBALTA) 30 MG extended release capsule Take 2 capsules by mouth daily 12/1/22  Yes Elex Forte Might, APRN - CNP   glipiZIDE (GLUCOTROL XL) 10 MG extended release tablet Take 1 tablet by mouth once daily 12/1/22  Yes Elex Forte Might, APRN - CNP   amoxicillin-clavulanate (AUGMENTIN) 875-125 MG per tablet Take 1 tablet by mouth 2 times daily for 7 days 12/1/22 12/8/22 Yes Elex Forte Might, APRN - CNP   pantoprazole (PROTONIX) 40 MG tablet Take 1 tablet by mouth once daily 11/25/22  Yes Elex Forte Might, APRN - CNP   pioglitazone (ACTOS) 30 MG tablet Take 1 tablet by mouth once daily 8/31/22  Yes Elex Forte Might, APRN - CNP   ezetimibe (ZETIA) 10 mg tablet Take 1 tablet by mouth once daily 6/1/22  Yes GIOVANI Wakefield CNP   verapamil (CALAN SR) 120 MG extended release tablet Take 1 tablet by mouth once daily 3/1/22  Yes GIOVANI Wakefield CNP   labetalol (NORMODYNE) 100 MG tablet Take 0.5 tablets by mouth 2 times daily 3/1/22  Yes GIOVANI Wakefield CNP   lisinopril (PRINIVIL;ZESTRIL) 20 MG tablet Take 1 tablet by mouth daily 3/1/22  Yes GIOVANI Wakefield CNP   meloxicam (MOBIC) 15 MG tablet Take 1 tablet by mouth daily 3/1/22  Yes GIOVANI Wakefield CNP   furosemide (LASIX) 20 MG tablet Take 1 tablet by mouth daily 3/1/22  Yes GIOVANI Wakefield CNP        Allergies:       Statins    Social History:     Tobacco:    reports that she has never smoked. She has never used smokeless tobacco.  Alcohol:      reports no history of alcohol use. Drug Use:  reports no history of drug use. Family History:     Family History   Problem Relation Age of Onset    Diabetes Father        Review of Systems:     Positive and Negative as described in HPI    Review of Systems   Constitutional:  Positive for fatigue, malaise/fatigue and weight gain. Negative for chills, diaphoresis, fever, irritability and weight loss. HENT:  Positive for congestion, postnasal drip, rhinorrhea, sinus pressure, sinus pain, sneezing and sore throat. Negative for ear pain, hoarse voice and trouble swallowing. Eyes:  Negative for blurred vision and visual disturbance. Respiratory:  Positive for cough. Negative for choking, shortness of breath and wheezing. Cardiovascular:  Negative for chest pain, palpitations, orthopnea and PND. Gastrointestinal:  Negative for abdominal pain, constipation, diarrhea, nausea and vomiting. Endocrine: Positive for polyuria. Negative for polydipsia and polyphagia. Genitourinary:  Negative for difficulty urinating and dysuria. Musculoskeletal:  Positive for arthralgias and gait problem.  Negative for neck pain and neck stiffness. Skin:  Negative for rash. Neurological:  Positive for headaches. Negative for dizziness, tingling, syncope, weakness, light-headedness and numbness. Psychiatric/Behavioral:  Positive for agitation, decreased concentration, depression, dysphoric mood and sleep disturbance. Negative for behavioral problems, confusion, hallucinations, self-injury and suicidal ideas. The patient is nervous/anxious and has insomnia. The patient is not hyperactive. Physical Exam:   Vitals:  /70 (Position: Sitting)   Pulse 78   Temp 97.7 °F (36.5 °C) (Temporal)   Resp 20   Wt 269 lb 4.8 oz (122.2 kg)   SpO2 98%   BMI 49.26 kg/m²     Physical Exam  Vitals and nursing note reviewed. Constitutional:       General: She is not in acute distress. Appearance: Normal appearance. She is well-developed. She is obese. She is not ill-appearing. HENT:      Mouth/Throat:      Mouth: Mucous membranes are moist.      Pharynx: Oropharynx is clear. Eyes:      General: No scleral icterus. Conjunctiva/sclera: Conjunctivae normal.   Cardiovascular:      Rate and Rhythm: Normal rate and regular rhythm. Heart sounds: No murmur heard. Pulmonary:      Effort: Pulmonary effort is normal.      Breath sounds: Normal breath sounds. No wheezing. Abdominal:      General: Bowel sounds are normal. There is no distension. Palpations: Abdomen is soft. Tenderness: There is no abdominal tenderness. Musculoskeletal:      Cervical back: Normal range of motion and neck supple. Right hip: Tenderness present. Decreased range of motion. Right lower leg: Edema (trace pitting) present. Left lower leg: Edema (trace pitting) present. Lymphadenopathy:      Cervical: No cervical adenopathy. Skin:     General: Skin is warm and dry. Findings: No rash. Neurological:      Mental Status: She is alert and oriented to person, place, and time.    Psychiatric:         Attention and Perception: Attention normal.         Mood and Affect: Mood is anxious and depressed. Affect is tearful. Speech: Speech normal.         Behavior: Behavior normal. Behavior is cooperative. Thought Content: Thought content normal. Thought content does not include homicidal or suicidal ideation. Thought content does not include homicidal plan. Cognition and Memory: Cognition normal.         Judgment: Judgment normal.       Data:     Lab Results   Component Value Date/Time     11/30/2022 01:12 PM    K 4.5 11/30/2022 01:12 PM    CL 97 11/30/2022 01:12 PM    CO2 25 11/30/2022 01:12 PM    BUN 26 11/30/2022 01:12 PM    CREATININE 1.01 11/30/2022 01:12 PM    GLUCOSE 337 11/30/2022 01:12 PM    PROT 7.7 10/17/2020 09:48 AM    LABALBU 4.6 10/17/2020 09:48 AM    BILITOT 0.45 10/17/2020 09:48 AM    ALKPHOS 65 10/17/2020 09:48 AM    AST 34 05/31/2022 10:24 AM    ALT 26 05/31/2022 10:24 AM     Lab Results   Component Value Date/Time    WBC 8.7 05/31/2022 10:24 AM    RBC 4.62 05/31/2022 10:24 AM    HGB 12.6 05/31/2022 10:24 AM    HCT 42.1 05/31/2022 10:24 AM    MCV 91.1 05/31/2022 10:24 AM    MCH 27.3 05/31/2022 10:24 AM    MCHC 29.9 05/31/2022 10:24 AM    RDW 14.6 05/31/2022 10:24 AM     05/31/2022 10:24 AM    MPV 10.6 05/31/2022 10:24 AM     Lab Results   Component Value Date/Time    TSH 1.78 07/18/2016 12:00 AM     Lab Results   Component Value Date/Time    CHOL 218 11/30/2022 01:12 PM    CHOL 271 11/10/2017 12:00 AM    HDL 41 11/30/2022 01:12 PM    LABA1C 9.6 11/30/2022 01:12 PM       Assessment/Plan:      Diagnosis Orders   1. Uncontrolled type 2 diabetes mellitus with hyperglycemia (HCC)  glipiZIDE (GLUCOTROL XL) 10 MG extended release tablet      2. Essential hypertension        3. Dysthymia  DULoxetine (CYMBALTA) 30 MG extended release capsule      4. Chronic right hip pain  External Referral To Orthopedic Surgery      5.  Acute non-recurrent pansinusitis  amoxicillin-clavulanate (AUGMENTIN) 875-125 MG per tablet      6. Need for influenza vaccination  Influenza, FLUCELVAX, (age 10 mo+), IM, Preservative Free, 0.5 mL        We will increase glipizide to 10 mg daily. Continue all other medications and restart Actos. Increase duloxetine to 60 mg daily. Call in a few weeks with progress. Sooner if any issues. Referral to orthopedics for hip pain. Medications as ordered for sinusitis. Call if not improving. We will see her back in 3 months with A1c check, sooner if any issues. 1.  Wendy received counseling on the following healthy behaviors: nutrition, exercise, and medication adherence  2. Patient given educational materials - see patient instructions  3. Was a self-tracking handout given in paper form or via [employer]? No  If yes, see orders or list here. 4.  Discussed use, benefit, and side effects of prescribed medications. Barriers to medication compliance addressed. All patient questions answered. Pt voiced understanding. 5.  Reviewed prior labs and health maintenance  6. Continue current medications, diet and exercise. Completed Refills   Requested Prescriptions     Signed Prescriptions Disp Refills    DULoxetine (CYMBALTA) 30 MG extended release capsule 90 capsule 1     Sig: Take 2 capsules by mouth daily    glipiZIDE (GLUCOTROL XL) 10 MG extended release tablet 90 tablet 1     Sig: Take 1 tablet by mouth once daily    amoxicillin-clavulanate (AUGMENTIN) 875-125 MG per tablet 14 tablet 0     Sig: Take 1 tablet by mouth 2 times daily for 7 days         Return in about 3 months (around 3/1/2023) for Check up- A1c in office.

## 2022-12-01 NOTE — PROGRESS NOTES
Vaccine Information Sheet, \"Influenza - Inactivated\"  given to Loras Holter, or parent/legal guardian of  Loras Holter and verbalized understanding. Patient responses:    Have you ever had a reaction to a flu vaccine? No  Are you able to eat eggs without adverse effects? Yes  Do you have any current illness? No  Have you ever had [place] Syndrome? No    Flu vaccine given per order. Please see immunization tab.

## 2022-12-01 NOTE — PATIENT INSTRUCTIONS
SURVEY:     You may be receiving a survey from [employer] regarding your visit today. Please complete the survey to enable us to provide the highest quality of care to you and your family. If you cannot score us a very good on any question, please call the office to discuss how we could have made your experience a very good one.      Thank you,    Margarita Dumont, APRN-CNP  Kayleen Ruiz, APRN-CNP  BYRON Riddle, [facility]  [employer], [facility]  [place], [facility]  Marina, PCA  [place], PM

## 2023-02-22 DIAGNOSIS — E11.65 UNCONTROLLED TYPE 2 DIABETES MELLITUS WITH HYPERGLYCEMIA (HCC): ICD-10-CM

## 2023-02-22 DIAGNOSIS — I10 ESSENTIAL HYPERTENSION: ICD-10-CM

## 2023-02-22 RX ORDER — LISINOPRIL 20 MG/1
TABLET ORAL
Qty: 30 TABLET | Refills: 0 | Status: SHIPPED | OUTPATIENT
Start: 2023-02-22 | End: 2023-04-05 | Stop reason: SDUPTHER

## 2023-02-22 RX ORDER — GLIPIZIDE 5 MG/1
TABLET, FILM COATED, EXTENDED RELEASE ORAL
Qty: 90 TABLET | Refills: 0 | OUTPATIENT
Start: 2023-02-22

## 2023-02-22 RX ORDER — MELOXICAM 15 MG/1
TABLET ORAL
Qty: 30 TABLET | Refills: 0 | Status: SHIPPED | OUTPATIENT
Start: 2023-02-22 | End: 2023-04-24 | Stop reason: SDUPTHER

## 2023-04-22 DIAGNOSIS — I10 ESSENTIAL HYPERTENSION: ICD-10-CM

## 2023-04-22 RX ORDER — LISINOPRIL 20 MG/1
TABLET ORAL
Qty: 30 TABLET | Refills: 0 | OUTPATIENT
Start: 2023-04-22

## 2023-04-22 RX ORDER — MELOXICAM 15 MG/1
TABLET ORAL
Qty: 30 TABLET | Refills: 0 | OUTPATIENT
Start: 2023-04-22
